# Patient Record
Sex: MALE | Race: BLACK OR AFRICAN AMERICAN | Employment: OTHER | URBAN - METROPOLITAN AREA
[De-identification: names, ages, dates, MRNs, and addresses within clinical notes are randomized per-mention and may not be internally consistent; named-entity substitution may affect disease eponyms.]

---

## 2018-04-18 ENCOUNTER — APPOINTMENT (OUTPATIENT)
Dept: GENERAL RADIOLOGY | Facility: HOSPITAL | Age: 63
DRG: 190 | End: 2018-04-18
Attending: EMERGENCY MEDICINE
Payer: MEDICARE

## 2018-04-18 ENCOUNTER — HOSPITAL ENCOUNTER (INPATIENT)
Facility: HOSPITAL | Age: 63
LOS: 3 days | Discharge: HOME HEALTH | DRG: 190 | End: 2018-04-22
Attending: EMERGENCY MEDICINE | Admitting: INTERNAL MEDICINE
Payer: MEDICARE

## 2018-04-18 DIAGNOSIS — J06.9 VIRAL URI: ICD-10-CM

## 2018-04-18 DIAGNOSIS — J45.51 SEVERE PERSISTENT ASTHMA WITH ACUTE EXACERBATION: Primary | ICD-10-CM

## 2018-04-18 DIAGNOSIS — R06.03 RESPIRATORY DISTRESS: ICD-10-CM

## 2018-04-18 PROBLEM — R73.9 HYPERGLYCEMIA: Status: ACTIVE | Noted: 2018-04-18

## 2018-04-18 PROCEDURE — 5A09357 ASSISTANCE WITH RESPIRATORY VENTILATION, LESS THAN 24 CONSECUTIVE HOURS, CONTINUOUS POSITIVE AIRWAY PRESSURE: ICD-10-PCS | Performed by: HOSPITALIST

## 2018-04-18 PROCEDURE — 99291 CRITICAL CARE FIRST HOUR: CPT | Performed by: INTERNAL MEDICINE

## 2018-04-18 PROCEDURE — 71045 X-RAY EXAM CHEST 1 VIEW: CPT | Performed by: EMERGENCY MEDICINE

## 2018-04-18 RX ORDER — METHYLPREDNISOLONE SODIUM SUCCINATE 125 MG/2ML
125 INJECTION, POWDER, LYOPHILIZED, FOR SOLUTION INTRAMUSCULAR; INTRAVENOUS ONCE
Status: DISCONTINUED | OUTPATIENT
Start: 2018-04-18 | End: 2018-04-18

## 2018-04-18 RX ORDER — LORAZEPAM 2 MG/ML
0.5 INJECTION INTRAMUSCULAR ONCE
Status: COMPLETED | OUTPATIENT
Start: 2018-04-18 | End: 2018-04-18

## 2018-04-18 RX ORDER — PREGABALIN 75 MG/1
75 CAPSULE ORAL
COMMUNITY
End: 2018-12-28 | Stop reason: ALTCHOICE

## 2018-04-18 RX ORDER — HYDROCODONE BITARTRATE AND ACETAMINOPHEN 7.5; 325 MG/1; MG/1
1 TABLET ORAL
COMMUNITY
Start: 2016-04-22 | End: 2018-12-28 | Stop reason: ALTCHOICE

## 2018-04-18 RX ORDER — MAGNESIUM SULFATE HEPTAHYDRATE 40 MG/ML
2 INJECTION, SOLUTION INTRAVENOUS ONCE
Status: COMPLETED | OUTPATIENT
Start: 2018-04-18 | End: 2018-04-18

## 2018-04-18 RX ORDER — METHYLPREDNISOLONE SODIUM SUCCINATE 125 MG/2ML
125 INJECTION, POWDER, LYOPHILIZED, FOR SOLUTION INTRAMUSCULAR; INTRAVENOUS ONCE
Status: COMPLETED | OUTPATIENT
Start: 2018-04-18 | End: 2018-04-18

## 2018-04-18 RX ORDER — VALSARTAN 320 MG/1
320 TABLET ORAL DAILY
Status: ON HOLD | COMMUNITY
End: 2019-03-18

## 2018-04-19 ENCOUNTER — APPOINTMENT (OUTPATIENT)
Dept: GENERAL RADIOLOGY | Facility: HOSPITAL | Age: 63
DRG: 190 | End: 2018-04-19
Attending: NURSE PRACTITIONER
Payer: MEDICARE

## 2018-04-19 PROBLEM — J06.9 VIRAL URI: Status: ACTIVE | Noted: 2018-04-19

## 2018-04-19 PROBLEM — R06.03 RESPIRATORY DISTRESS: Status: ACTIVE | Noted: 2018-04-19

## 2018-04-19 PROCEDURE — 71045 X-RAY EXAM CHEST 1 VIEW: CPT | Performed by: NURSE PRACTITIONER

## 2018-04-19 PROCEDURE — 99232 SBSQ HOSP IP/OBS MODERATE 35: CPT | Performed by: HOSPITALIST

## 2018-04-19 RX ORDER — METHYLPREDNISOLONE SODIUM SUCCINATE 125 MG/2ML
60 INJECTION, POWDER, LYOPHILIZED, FOR SOLUTION INTRAMUSCULAR; INTRAVENOUS EVERY 8 HOURS
Status: DISCONTINUED | OUTPATIENT
Start: 2018-04-19 | End: 2018-04-19

## 2018-04-19 RX ORDER — ENOXAPARIN SODIUM 100 MG/ML
40 INJECTION SUBCUTANEOUS DAILY
Status: DISCONTINUED | OUTPATIENT
Start: 2018-04-19 | End: 2018-04-19

## 2018-04-19 RX ORDER — ACETAMINOPHEN 325 MG/1
650 TABLET ORAL EVERY 4 HOURS PRN
Status: DISCONTINUED | OUTPATIENT
Start: 2018-04-19 | End: 2018-04-22

## 2018-04-19 RX ORDER — SODIUM PHOSPHATE, DIBASIC AND SODIUM PHOSPHATE, MONOBASIC 7; 19 G/133ML; G/133ML
1 ENEMA RECTAL ONCE AS NEEDED
Status: DISCONTINUED | OUTPATIENT
Start: 2018-04-19 | End: 2018-04-22

## 2018-04-19 RX ORDER — DEXMEDETOMIDINE HYDROCHLORIDE 4 UG/ML
INJECTION, SOLUTION INTRAVENOUS CONTINUOUS
Status: DISCONTINUED | OUTPATIENT
Start: 2018-04-19 | End: 2018-04-20 | Stop reason: HOSPADM

## 2018-04-19 RX ORDER — POLYETHYLENE GLYCOL 3350 17 G/17G
17 POWDER, FOR SOLUTION ORAL DAILY PRN
Status: DISCONTINUED | OUTPATIENT
Start: 2018-04-19 | End: 2018-04-22

## 2018-04-19 RX ORDER — HALOPERIDOL 5 MG/ML
1 INJECTION INTRAMUSCULAR EVERY 6 HOURS PRN
Status: DISCONTINUED | OUTPATIENT
Start: 2018-04-19 | End: 2018-04-22

## 2018-04-19 RX ORDER — PREGABALIN 25 MG/1
25 CAPSULE ORAL 3 TIMES DAILY
Status: DISCONTINUED | OUTPATIENT
Start: 2018-04-19 | End: 2018-04-22

## 2018-04-19 RX ORDER — PREGABALIN 75 MG/1
75 CAPSULE ORAL NIGHTLY
Status: DISCONTINUED | OUTPATIENT
Start: 2018-04-19 | End: 2018-04-19

## 2018-04-19 RX ORDER — LOSARTAN POTASSIUM 100 MG/1
100 TABLET ORAL DAILY
Status: DISCONTINUED | OUTPATIENT
Start: 2018-04-19 | End: 2018-04-22

## 2018-04-19 RX ORDER — HYDROCODONE BITARTRATE AND ACETAMINOPHEN 5; 325 MG/1; MG/1
2 TABLET ORAL EVERY 4 HOURS PRN
Status: DISCONTINUED | OUTPATIENT
Start: 2018-04-19 | End: 2018-04-20

## 2018-04-19 RX ORDER — BENZONATATE 200 MG/1
200 CAPSULE ORAL 2 TIMES DAILY PRN
Status: DISCONTINUED | OUTPATIENT
Start: 2018-04-19 | End: 2018-04-22

## 2018-04-19 RX ORDER — BISACODYL 10 MG
10 SUPPOSITORY, RECTAL RECTAL
Status: DISCONTINUED | OUTPATIENT
Start: 2018-04-19 | End: 2018-04-22

## 2018-04-19 RX ORDER — ONDANSETRON 2 MG/ML
4 INJECTION INTRAMUSCULAR; INTRAVENOUS EVERY 6 HOURS PRN
Status: DISCONTINUED | OUTPATIENT
Start: 2018-04-19 | End: 2018-04-22

## 2018-04-19 RX ORDER — FAMOTIDINE 10 MG/ML
20 INJECTION, SOLUTION INTRAVENOUS 2 TIMES DAILY
Status: DISCONTINUED | OUTPATIENT
Start: 2018-04-19 | End: 2018-04-22

## 2018-04-19 RX ORDER — FAMOTIDINE 20 MG/1
20 TABLET ORAL 2 TIMES DAILY
Status: DISCONTINUED | OUTPATIENT
Start: 2018-04-19 | End: 2018-04-22

## 2018-04-19 RX ORDER — IPRATROPIUM BROMIDE AND ALBUTEROL SULFATE 2.5; .5 MG/3ML; MG/3ML
3 SOLUTION RESPIRATORY (INHALATION)
Status: DISCONTINUED | OUTPATIENT
Start: 2018-04-19 | End: 2018-04-22

## 2018-04-19 RX ORDER — FINASTERIDE 5 MG/1
5 TABLET, FILM COATED ORAL DAILY
Status: DISCONTINUED | OUTPATIENT
Start: 2018-04-19 | End: 2018-04-22

## 2018-04-19 RX ORDER — ACETAMINOPHEN 325 MG/1
650 TABLET ORAL EVERY 6 HOURS PRN
Status: DISCONTINUED | OUTPATIENT
Start: 2018-04-19 | End: 2018-04-19

## 2018-04-19 RX ORDER — LORAZEPAM 2 MG/ML
1 INJECTION INTRAMUSCULAR EVERY 4 HOURS PRN
Status: DISCONTINUED | OUTPATIENT
Start: 2018-04-19 | End: 2018-04-22

## 2018-04-19 RX ORDER — METHYLPREDNISOLONE SODIUM SUCCINATE 125 MG/2ML
60 INJECTION, POWDER, LYOPHILIZED, FOR SOLUTION INTRAMUSCULAR; INTRAVENOUS EVERY 12 HOURS
Status: COMPLETED | OUTPATIENT
Start: 2018-04-19 | End: 2018-04-20

## 2018-04-19 RX ORDER — MONTELUKAST SODIUM 10 MG/1
10 TABLET ORAL NIGHTLY
Status: DISCONTINUED | OUTPATIENT
Start: 2018-04-19 | End: 2018-04-22

## 2018-04-19 RX ORDER — KETOROLAC TROMETHAMINE 5 MG/ML
1 SOLUTION OPHTHALMIC 4 TIMES DAILY
Status: DISCONTINUED | OUTPATIENT
Start: 2018-04-19 | End: 2018-04-22

## 2018-04-19 RX ORDER — DUTASTERIDE 0.5 MG/1
0.5 CAPSULE, LIQUID FILLED ORAL DAILY
COMMUNITY
End: 2018-12-28 | Stop reason: ALTCHOICE

## 2018-04-19 RX ORDER — IPRATROPIUM BROMIDE AND ALBUTEROL SULFATE 2.5; .5 MG/3ML; MG/3ML
3 SOLUTION RESPIRATORY (INHALATION) EVERY 6 HOURS PRN
Status: DISCONTINUED | OUTPATIENT
Start: 2018-04-19 | End: 2018-04-22

## 2018-04-19 RX ORDER — SODIUM CHLORIDE 9 MG/ML
INJECTION, SOLUTION INTRAVENOUS CONTINUOUS
Status: DISCONTINUED | OUTPATIENT
Start: 2018-04-19 | End: 2018-04-20

## 2018-04-19 RX ORDER — SODIUM CHLORIDE 9 MG/ML
INJECTION, SOLUTION INTRAVENOUS CONTINUOUS
Status: DISCONTINUED | OUTPATIENT
Start: 2018-04-19 | End: 2018-04-19

## 2018-04-19 RX ORDER — HYDRALAZINE HYDROCHLORIDE 20 MG/ML
10 INJECTION INTRAMUSCULAR; INTRAVENOUS EVERY 4 HOURS PRN
Status: DISCONTINUED | OUTPATIENT
Start: 2018-04-19 | End: 2018-04-22

## 2018-04-19 RX ORDER — DOCUSATE SODIUM 100 MG/1
100 CAPSULE, LIQUID FILLED ORAL 2 TIMES DAILY
Status: DISCONTINUED | OUTPATIENT
Start: 2018-04-19 | End: 2018-04-22

## 2018-04-19 RX ORDER — HYDROCODONE BITARTRATE AND ACETAMINOPHEN 5; 325 MG/1; MG/1
1 TABLET ORAL EVERY 6 HOURS PRN
Status: DISCONTINUED | OUTPATIENT
Start: 2018-04-19 | End: 2018-04-20

## 2018-04-19 RX ORDER — ENOXAPARIN SODIUM 100 MG/ML
40 INJECTION SUBCUTANEOUS DAILY
Status: DISCONTINUED | OUTPATIENT
Start: 2018-04-19 | End: 2018-04-22

## 2018-04-19 RX ORDER — DEXTROSE MONOHYDRATE 25 G/50ML
50 INJECTION, SOLUTION INTRAVENOUS
Status: DISCONTINUED | OUTPATIENT
Start: 2018-04-19 | End: 2018-04-22

## 2018-04-19 RX ORDER — QUETIAPINE 50 MG/1
50 TABLET, FILM COATED ORAL NIGHTLY
Status: DISCONTINUED | OUTPATIENT
Start: 2018-04-19 | End: 2018-04-22

## 2018-04-19 RX ORDER — IPRATROPIUM BROMIDE AND ALBUTEROL SULFATE 2.5; .5 MG/3ML; MG/3ML
3 SOLUTION RESPIRATORY (INHALATION)
Status: DISCONTINUED | OUTPATIENT
Start: 2018-04-19 | End: 2018-04-19

## 2018-04-19 NOTE — CONSULTS
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  He has agreed to steroids in the short run but will not take long-term.     PAST HISTORY:     · Asthma  · Essential hypertension  · Type 2 diabetes mellitus    Histo 4/19/2018 at Unknown time   Ketorolac Tromethamine (ACULAR) 0.4 % Ophthalmic Solution Apply 1 drop to eye 4 (four) times daily.  Disp: 5 mL Rfl: 0 4/19/2018 at Unknown time   NON FORMULARY Oxygen 2L at home at night Disp:  Rfl:  4/19/2018 at Unknown time Review:  Recent Labs   Lab  04/18/18 2114  04/19/18   0430   RBC  4.76  4.69   HGB  13.7  13.2   HCT  43.2  42.8   MCV  90.8  91.3   MCH  28.8  28.1   MCHC  31.7  30.8*   RDW  11.8  11.8   NEPRELIM  5.67  7.54*   WBC  8.3  7.8   PLT  228.0  186.0     Rec Brooke HAGER.  Crit care 45  4/19/2018  6:18 AM

## 2018-04-19 NOTE — ED PROVIDER NOTES
Patient Seen in: [facility] Emergency Department    History   Patient presents with:  Dyspnea NANETTE SOB (respiratory)    Stated Complaint: nanette    HPI    Patient presents to the emergency department with difficulty in breathing.- has a history of asthm negative except as noted above.     Physical Exam   ED Triage Vitals [04/18/18 2110]  BP: (!) 175/98  Pulse: 108  Resp: 23  Temp: (!) 96.8 °F (36 °C)  Temp src: Temporal  SpO2: 95 %  O2 Device: Nasal cannula    Current:BP (!) 165/89   Pulse 96   Temp 97.9 ° (14)   MAGNESIUM   PHOSPHORUS   CBC WITH DIFFERENTIAL WITH PLATELET   HEMOGLOBIN A1C   RAINBOW DRAW BLUE   RAINBOW DRAW LAVENDER   RAINBOW DRAW LIGHT GREEN   RAINBOW DRAW GOLD   RESPIRATORY PANEL FLU EXPANDED   MRSA CULTURE ONLY   CBC W/ DIFFERENTIAL     E he could get his medication. Then, Solu-Medrol given which patient did agree to magnesium drip started.   Patient is getting ongoing albuterol after Lorazepam was able to tolerate BiPAP again and then noticeably called and had good effect with the BiPAP an

## 2018-04-19 NOTE — ED NOTES
Patient standing by side of bed, monitor discontinued, breathing treatment on bed. He stated to this RN he can't breathe and he is hot and needs to cool down. RN educated and reinforced use of breathing treatment. Replaced monitor.  Patient yelling at this

## 2018-04-19 NOTE — RESPIRATORY THERAPY NOTE
Received patient on CPAP. Attempted to place patient on BIPAP but did not tolerate and refused. Pt breath sounds-diminished/wheezes with labored breathing. Continuos neb started with slight improvement.  Pt. very anxious and refused to sit on the bed and kep

## 2018-04-19 NOTE — ED NOTES
Patient agreeable to taking chest xray, laying upright in bed. RN administered medications as documented and patient stated some relief, he is no longer as agitated as previously. He is still tachypneic and labored. Breathing treatment continued.  Respirato

## 2018-04-19 NOTE — PLAN OF CARE
CARDIOVASCULAR - ADULT    • Maintains optimal cardiac output and hemodynamic stability Not Progressing          METABOLIC/FLUID AND ELECTROLYTES - ADULT    • Glucose maintained within prescribed range Not Progressing          RESPIRATORY - ADULT    • Achie

## 2018-04-19 NOTE — PROGRESS NOTES
[facility]  Progress Note     Ronenncwisam Lange Patient Status:  Inpatient    1955 MRN [id number]   [facility] 4SW-A Attending Evelyn Erickson MD   Hosp Day # 0 PCP Leta Mann     Chief Complaint: Dyspnea    S: Patient off bipap Daily   • Montelukast Sodium  10 mg Oral Nightly   • docusate sodium  100 mg Oral BID   • QUEtiapine Fumarate  50 mg Oral Nightly   • ketorolac tromethamine  1 drop Ophthalmic QID   • pregabalin  75 mg Oral Nightly   • Tobramycin Sulfate  1 Application Oph

## 2018-04-19 NOTE — H&P
[facility]  History and Physical     Benny Carpenter Patient Status:  Inpatient    1955 MRN [id number]   [facility] 4SW-A Attending Jose Alfredo Meyer MD   Hosp Day # 0 PCP Mega Austin     Chief Complaint: SOB, agitation     Hist Encounter:  IBUPROFEN OR  Disp:  Rfl:    ipratropium-albuterol (COMBIVENT)  MCG/ACT Inhalation Aerosol Inhale 2 puffs into the lungs 4 (four) times daily.  Disp:  Rfl:    Albuterol Sulfate HFA (PROAIR HFA) 108 (90 BASE) MCG/ACT Inhalation Aero Soln In Lab  04/18/18   2114   GLU  287*   BUN  17   CREATSERUM  1.29   GFRAA  69   GFRNAA  60   CA  9.2   ALB  3.8   NA  136   K  4.2   CL  102   CO2  26.0   ALKPHO  98   AST  31   ALT  28   BILT  0.4   TP  7.5       No results for input(s): PTP, INR in the las

## 2018-04-19 NOTE — PROGRESS NOTES
[id number] [facility] Patient Status:  Inpatient    1955 MRN [id number]   [facility] 4SW-A Attending Abby Avina MD   Hosp Day # 0 PCP Dwight Cai     Critical Care Progress Note     S: Mr. Frannie Booker came to the ED with shor MCHC 31.7 04/18/2018   RDW 11.8 04/18/2018   .0 04/18/2018       Lab Results  Component Value Date    04/18/2018   K 4.2 04/18/2018    04/18/2018   CO2 26.0 04/18/2018   BUN 17 04/18/2018   CREATSERUM 1.29 04/18/2018    04/18/2018

## 2018-04-19 NOTE — ED INITIAL ASSESSMENT (HPI)
Patient presents with nanette, he is normally on oxygen at home and had a new concentrator delivered today and the adaptor was different and he was unable to use his normal oxygen. He also has had a productive cough with green sputum.  He used his inhaler 6 rosa

## 2018-04-20 PROCEDURE — 99232 SBSQ HOSP IP/OBS MODERATE 35: CPT | Performed by: HOSPITALIST

## 2018-04-20 RX ORDER — HYDROCODONE BITARTRATE AND ACETAMINOPHEN 10; 325 MG/1; MG/1
1 TABLET ORAL EVERY 4 HOURS PRN
Status: DISCONTINUED | OUTPATIENT
Start: 2018-04-20 | End: 2018-04-22

## 2018-04-20 RX ORDER — ALBUTEROL SULFATE 90 UG/1
1 AEROSOL, METERED RESPIRATORY (INHALATION) EVERY 4 HOURS PRN
Status: DISCONTINUED | OUTPATIENT
Start: 2018-04-20 | End: 2018-04-22

## 2018-04-20 RX ORDER — PREDNISONE 20 MG/1
40 TABLET ORAL
Status: DISCONTINUED | OUTPATIENT
Start: 2018-04-21 | End: 2018-04-21

## 2018-04-20 NOTE — PROGRESS NOTES
04/20/18 0430   Provider Notification   Reason for Communication Patient request  (snacks)   Provider Name Naz Ugarte MD   Method of Communication Page   Response Waiting for response   Notification Time 0430    called back- ok to give carb cover n

## 2018-04-20 NOTE — PROGRESS NOTES
Called ortho consult. Dr. Jhony Braden states pt can f/u as an outpt for poss knee steroid injections.

## 2018-04-20 NOTE — PROGRESS NOTES
[facility]  Progress Note     John Posada Patient Status:  Inpatient    1955 MRN [id number]   [facility] 4SW-A Attending Marni Roth MD   Hosp Day # 1 PCP Hay Li     Chief Complaint: Dyspnea    S: Patient states he day   • famoTIDine  20 mg Oral BID    Or   • famoTIDine  20 mg Intravenous BID   • Fluticasone Furoate-Vilanterol  1 puff Inhalation Daily   • Montelukast Sodium  10 mg Oral Nightly   • docusate sodium  100 mg Oral BID   • QUEtiapine Fumarate  50 mg Oral N

## 2018-04-20 NOTE — PROGRESS NOTES
Multidisciplinary Discharge Rounds held 4/20/2018. Treatment team members present today include , , Charge Nurse,  Nurse, RT, PT and Pharmacy caring for [facility].      Other care providers present:    Patient Active Problem

## 2018-04-20 NOTE — PLAN OF CARE
Upon assessment pt AOx4, states he has pain in his shoulders, prn medications given. Pt resting comfortably on 2L NC, slight expiratory wheeze but denies any SOB. Pt up to void at bedside, tolerating well. Awaiting transport, report given to [facility].

## 2018-04-20 NOTE — HOME CARE LIAISON
Referral received for home health and patient is agreeable to [address].     IAGNOSES AND PERTINENT MEDICAL HISTORY:  VIRAL URI, ASTHMA    FACILITY NAME AND DC DATE:  [facility] VISIT WITH:  BRIGIDO MET WITH PATIENT AT

## 2018-04-20 NOTE — CM/SW NOTE
Received order due to concerns about pt's home and family situation. Met with pt who is a 62 y/o man with history of COPD currently admitted for asthma exacerbation. Pt lives with his wife and 3 children (ages 13, 9 and 6).   Pt's 26 y/o dtr who lives in stated that he is often non-compliant. They have supplied a concentrator only, no portable tanks. Pt has requested portables, but [facility] has not received orders for this.   They did receive orders for neb and attempted to deliver in 10/2017, however pt r

## 2018-04-20 NOTE — PROGRESS NOTES
[facility] Pulmonary/Critical Care Progress Note     SUBJECTIVE/24H Events: All events, procedures, notes reviewed. He feels better today, able to expectorate mucus but still feels dyspneic and wheezing. No f/c/s.  No ch GFRAA  69  62  87   GFRNAA  60  54*  75   CA  9.2  9.3  9.0   NA  136  133*  138   K  4.2  4.9  4.1   CL  102  101  104   CO2  26.0  25.0  27.0     Recent Labs   Lab  04/18/18   2114  04/19/18   0430  04/20/18   0614   RBC  4.76  4.69  4.22*   HGB  13.7 times per day   • famoTIDine  20 mg Oral BID    Or   • famoTIDine  20 mg Intravenous BID   • Fluticasone Furoate-Vilanterol  1 puff Inhalation Daily   • Montelukast Sodium  10 mg Oral Nightly   • docusate sodium  100 mg Oral BID   • QUEtiapine Fumarate  50

## 2018-04-21 PROCEDURE — 99232 SBSQ HOSP IP/OBS MODERATE 35: CPT | Performed by: HOSPITALIST

## 2018-04-21 RX ORDER — PREDNISONE 50 MG/1
50 TABLET ORAL
Status: DISCONTINUED | OUTPATIENT
Start: 2018-04-22 | End: 2018-04-22

## 2018-04-21 NOTE — DIETARY NOTE
Nutrition Short Note    Dietitian consult received for diabetes diet education.  Appropriate education and handout provided.  Discussed carbohydrate counting, role of carbohydrates in blood sugars, carbohydrate containing foods, appropriate carbohydrates at

## 2018-04-21 NOTE — PROGRESS NOTES
[facility]  Progress Note    Lexie Magaña Patient Status:  Inpatient    1955 MRN [id number]   [facility] 5NW-A Attending Endy Jacob MD   Hosp Day # 2 PCP Bernabe Ocampo     Subjective:  Lexie Magaña is a(n) 63year old male Shoulder arthritis     Aseptic necrosis of bone, site unspecified     S/P shoulder surgery-global 2/7/14     Syncope     Asthma exacerbation     Acute asthma     Hyperglycemia     Severe persistent asthma with acute exacerbation     Opioid abuse     Narc

## 2018-04-21 NOTE — PROGRESS NOTES
[facility]  Progress Note     Argelia Angulo Patient Status:  Inpatient    1955 MRN [id number]   [facility] 4SW-A Attending Xenia Corona MD   Hosp Day # 2 PCP Ann Mike     Chief Complaint: Dyspnea    S: Patient states he breakfast   • enoxaparin  40 mg Subcutaneous Daily   • ipratropium-albuterol  3 mL Nebulization 6 times per day   • famoTIDine  20 mg Oral BID    Or   • famoTIDine  20 mg Intravenous BID   • Fluticasone Furoate-Vilanterol  1 puff Inhalation Daily   • Eugene

## 2018-04-21 NOTE — PLAN OF CARE
CARDIOVASCULAR - ADULT    • Maintains optimal cardiac output and hemodynamic stability Progressing        METABOLIC/FLUID AND ELECTROLYTES - ADULT    • Glucose maintained within prescribed range Progressing        Patient/Family Goals    • Patient/Family L

## 2018-04-22 VITALS
OXYGEN SATURATION: 94 % | DIASTOLIC BLOOD PRESSURE: 85 MMHG | TEMPERATURE: 98 F | HEIGHT: 76 IN | WEIGHT: 224.31 LBS | SYSTOLIC BLOOD PRESSURE: 159 MMHG | HEART RATE: 84 BPM | RESPIRATION RATE: 18 BRPM | BODY MASS INDEX: 27.32 KG/M2

## 2018-04-22 PROCEDURE — 99239 HOSP IP/OBS DSCHRG MGMT >30: CPT | Performed by: HOSPITALIST

## 2018-04-22 RX ORDER — IPRATROPIUM BROMIDE AND ALBUTEROL SULFATE 2.5; .5 MG/3ML; MG/3ML
3 SOLUTION RESPIRATORY (INHALATION)
Qty: 270 VIAL | Refills: 0 | Status: SHIPPED | OUTPATIENT
Start: 2018-04-22 | End: 2018-12-28 | Stop reason: ALTCHOICE

## 2018-04-22 RX ORDER — MONTELUKAST SODIUM 10 MG/1
10 TABLET ORAL NIGHTLY
Qty: 30 TABLET | Refills: 0 | Status: ON HOLD | OUTPATIENT
Start: 2018-04-22 | End: 2019-04-06

## 2018-04-22 RX ORDER — BENZONATATE 200 MG/1
200 CAPSULE ORAL 2 TIMES DAILY PRN
Qty: 30 CAPSULE | Refills: 0 | Status: SHIPPED | OUTPATIENT
Start: 2018-04-22 | End: 2019-03-13 | Stop reason: CLARIF

## 2018-04-22 RX ORDER — PREDNISONE 10 MG/1
TABLET ORAL
Qty: 45 TABLET | Refills: 0 | Status: SHIPPED | OUTPATIENT
Start: 2018-04-22 | End: 2018-05-29 | Stop reason: ALTCHOICE

## 2018-04-22 RX ORDER — POTASSIUM CHLORIDE 20 MEQ/1
40 TABLET, EXTENDED RELEASE ORAL EVERY 4 HOURS
Status: COMPLETED | OUTPATIENT
Start: 2018-04-22 | End: 2018-04-22

## 2018-04-22 NOTE — PROGRESS NOTES
NURSING DISCHARGE NOTE    Discharged Home via Wheelchair. Accompanied by Support staff  Belongings Taken by patient/family. Pt is assessed and ready for discharge. Instructions gone over with patient. Scripts sent with patient to fill.  All questi

## 2018-04-22 NOTE — CM/SW NOTE
Call from RN that pt will d/c today and has a question for SW. He is on room air and independent. Spoke with pt. He notes that he has to get a PET scan for his wife and wanted phone #. He will be talking to her PCP re: order.   TRI referred pt to [facility]

## 2018-04-22 NOTE — PROGRESS NOTES
[facility]  Progress Note     Margareth [place] Patient Status:  Inpatient    1955 MRN [id number]   [facility] 4SW-A Attending Lul Velasquez MD   Hosp Day # 3 PCP Yury Strickland     Chief Complaint: Dyspnea    S: Patient without c Fluticasone Furoate-Vilanterol  1 puff Inhalation Daily   • Montelukast Sodium  10 mg Oral Nightly   • docusate sodium  100 mg Oral BID   • QUEtiapine Fumarate  50 mg Oral Nightly   • ketorolac tromethamine  1 drop Ophthalmic QID   • Tobramycin Sulfate  1

## 2018-04-23 NOTE — CM/SW NOTE
Patient discharged on 04/22/2018 as previously planned.        04/23/18 0900   Discharge disposition   Expected discharge disposition Home-Health   Name of Facillity/Home Care/Hospice Residential   Home services after discharge Skilled home care

## 2018-04-23 NOTE — DISCHARGE SUMMARY
[facility]  DISCHARGE SUMMARY     Samantha Cain Patient Status:  Inpatient    1955 MRN [id number]   [facility] 5NW-A Attending No att. providers found   Hosp Day # 3 PCP Martin Horne     Date of Admission: 2018  Date of improve. His blood sugars improved with adjustment of his diabetes regimen. He has been weaned off oxygen and is been cleared for discharge.   He was given a referral to orthopedic surgery as an outpatient for possible steroid injections for bilateral ost XIMENA  What changed: You were already taking a medication with the same name, and this prescription was added. Make sure you understand how and when to take each. Take 3 mL by nebulization TID & HS.    Quantity:  270 vial  Refills:  0     insulin det MG Tabs  · predniSONE 10 MG Tabs  · Umeclidinium Bromide 62.5 MCG/INH Aepb         [place]    Follow-up appointment:   Sera Skinner [phone]  [phone]    In 1 week      Rodolfo Whitmore MD  [address]

## 2018-04-28 ENCOUNTER — HOSPITAL ENCOUNTER (EMERGENCY)
Facility: HOSPITAL | Age: 63
Discharge: LEFT BEFORE DISCHARGE | End: 2018-04-28
Attending: EMERGENCY MEDICINE
Payer: MEDICARE

## 2018-04-28 VITALS
TEMPERATURE: 99 F | RESPIRATION RATE: 11 BRPM | SYSTOLIC BLOOD PRESSURE: 149 MMHG | DIASTOLIC BLOOD PRESSURE: 70 MMHG | HEART RATE: 101 BPM | OXYGEN SATURATION: 94 %

## 2018-04-28 DIAGNOSIS — R73.9 HYPERGLYCEMIA: Primary | ICD-10-CM

## 2018-04-28 DIAGNOSIS — Z53.29 LEFT AGAINST MEDICAL ADVICE: ICD-10-CM

## 2018-04-28 PROCEDURE — 85025 COMPLETE CBC W/AUTO DIFF WBC: CPT

## 2018-04-28 PROCEDURE — 80053 COMPREHEN METABOLIC PANEL: CPT

## 2018-04-28 PROCEDURE — 99284 EMERGENCY DEPT VISIT MOD MDM: CPT

## 2018-04-28 PROCEDURE — 96361 HYDRATE IV INFUSION ADD-ON: CPT

## 2018-04-28 PROCEDURE — 82962 GLUCOSE BLOOD TEST: CPT

## 2018-04-28 PROCEDURE — 96372 THER/PROPH/DIAG INJ SC/IM: CPT

## 2018-04-28 PROCEDURE — 81003 URINALYSIS AUTO W/O SCOPE: CPT | Performed by: EMERGENCY MEDICINE

## 2018-04-28 PROCEDURE — 96375 TX/PRO/DX INJ NEW DRUG ADDON: CPT

## 2018-04-28 PROCEDURE — 85025 COMPLETE CBC W/AUTO DIFF WBC: CPT | Performed by: EMERGENCY MEDICINE

## 2018-04-28 PROCEDURE — 96374 THER/PROPH/DIAG INJ IV PUSH: CPT

## 2018-04-28 PROCEDURE — 80053 COMPREHEN METABOLIC PANEL: CPT | Performed by: EMERGENCY MEDICINE

## 2018-04-28 PROCEDURE — 99285 EMERGENCY DEPT VISIT HI MDM: CPT

## 2018-04-28 RX ORDER — INSULIN ASPART 100 [IU]/ML
0.2 INJECTION, SOLUTION INTRAVENOUS; SUBCUTANEOUS ONCE
Status: COMPLETED | OUTPATIENT
Start: 2018-04-28 | End: 2018-04-28

## 2018-04-28 RX ORDER — HYDROMORPHONE HYDROCHLORIDE 1 MG/ML
1 INJECTION, SOLUTION INTRAMUSCULAR; INTRAVENOUS; SUBCUTANEOUS ONCE
Status: COMPLETED | OUTPATIENT
Start: 2018-04-28 | End: 2018-04-28

## 2018-04-28 RX ORDER — ONDANSETRON 2 MG/ML
4 INJECTION INTRAMUSCULAR; INTRAVENOUS ONCE
Status: COMPLETED | OUTPATIENT
Start: 2018-04-28 | End: 2018-04-28

## 2018-04-28 NOTE — ED PROVIDER NOTES
Patient Seen in: [facility] Emergency Department    History   Patient presents with:  Hyperglycemia (metabolic)    Stated Complaint: hyperglycemic- 314 with blurred vision    HPI    68-year-old male comes to the emergency department complaining of r for stated complaint: hyperglycemic- 314 with blurred vision  Other systems are as noted in HPI. Constitutional and vital signs reviewed. All other systems reviewed and negative except as noted above.     Physical Exam   ED Triage Vitals [04/28/18 121 Neutrophil Absolute 13.56 (*)     Lymphocyte Absolute 0.47 (*)     All other components within normal limits   CBC WITH DIFFERENTIAL WITH PLATELET    Narrative: The following orders were created for panel order CBC WITH DIFFERENTIAL WITH PLATELET.   Pro

## 2018-04-28 NOTE — ED NOTES
Pt pulled out his IV and left with his family. Pt seen in the hallway, stated that he has everything he needs and is leaving. Md notified. Pt did not receive discharge instructions or Rxs.

## 2018-04-28 NOTE — ED INITIAL ASSESSMENT (HPI)
Pt to ED from home with c/o hyperglycemia, decreased po intake, blurred vision x3 days.  Pt admitted to hospital last week for asthma and shoulder pain after surgery, was discharged without a prescription for insulin/norco.

## 2018-04-28 NOTE — CM/SW NOTE
This writer went to speak with patient regarding his diabetes follow up. Patient reports he was recently discharged on Levemir. He was recently taking glucophage and Levemir, but stopped the glucophage because \"it wasn't doing anything. \"  This writer as

## 2018-05-09 ENCOUNTER — TELEPHONE (OUTPATIENT)
Dept: ENDOCRINOLOGY CLINIC | Facility: CLINIC | Age: 63
End: 2018-05-09

## 2018-05-19 ENCOUNTER — APPOINTMENT (OUTPATIENT)
Dept: GENERAL RADIOLOGY | Facility: HOSPITAL | Age: 63
End: 2018-05-19
Attending: EMERGENCY MEDICINE
Payer: MEDICARE

## 2018-05-19 ENCOUNTER — HOSPITAL ENCOUNTER (EMERGENCY)
Facility: HOSPITAL | Age: 63
Discharge: HOME | End: 2018-05-19
Attending: EMERGENCY MEDICINE
Payer: MEDICARE

## 2018-05-19 VITALS
HEART RATE: 103 BPM | HEIGHT: 76 IN | BODY MASS INDEX: 27.89 KG/M2 | SYSTOLIC BLOOD PRESSURE: 146 MMHG | RESPIRATION RATE: 18 BRPM | TEMPERATURE: 99 F | WEIGHT: 229 LBS | DIASTOLIC BLOOD PRESSURE: 88 MMHG | OXYGEN SATURATION: 97 %

## 2018-05-19 DIAGNOSIS — R18.8 ABDOMINAL FLUID COLLECTION: Primary | ICD-10-CM

## 2018-05-19 DIAGNOSIS — S46.911A STRAIN OF RIGHT SHOULDER, INITIAL ENCOUNTER: ICD-10-CM

## 2018-05-19 PROCEDURE — 80048 BASIC METABOLIC PNL TOTAL CA: CPT | Performed by: EMERGENCY MEDICINE

## 2018-05-19 PROCEDURE — 85025 COMPLETE CBC W/AUTO DIFF WBC: CPT | Performed by: EMERGENCY MEDICINE

## 2018-05-19 PROCEDURE — 73030 X-RAY EXAM OF SHOULDER: CPT | Performed by: EMERGENCY MEDICINE

## 2018-05-19 PROCEDURE — 84145 PROCALCITONIN (PCT): CPT | Performed by: EMERGENCY MEDICINE

## 2018-05-19 PROCEDURE — 36415 COLL VENOUS BLD VENIPUNCTURE: CPT

## 2018-05-19 PROCEDURE — 96372 THER/PROPH/DIAG INJ SC/IM: CPT

## 2018-05-19 PROCEDURE — 99284 EMERGENCY DEPT VISIT MOD MDM: CPT

## 2018-05-19 PROCEDURE — 99285 EMERGENCY DEPT VISIT HI MDM: CPT

## 2018-05-19 RX ORDER — ACETAMINOPHEN 500 MG
1000 TABLET ORAL ONCE
Status: DISCONTINUED | OUTPATIENT
Start: 2018-05-19 | End: 2018-05-19

## 2018-05-19 RX ORDER — CEPHALEXIN 500 MG/1
500 CAPSULE ORAL 4 TIMES DAILY
Qty: 28 CAPSULE | Refills: 0 | Status: SHIPPED | OUTPATIENT
Start: 2018-05-19 | End: 2018-05-26

## 2018-05-19 RX ORDER — POLYMYXIN B SULFATE AND TRIMETHOPRIM 1; 10000 MG/ML; [USP'U]/ML
1 SOLUTION OPHTHALMIC EVERY 6 HOURS
Qty: 10 ML | Refills: 0 | Status: SHIPPED | OUTPATIENT
Start: 2018-05-19 | End: 2018-05-24

## 2018-05-19 RX ORDER — IBUPROFEN 400 MG/1
400 TABLET ORAL ONCE
Status: DISCONTINUED | OUTPATIENT
Start: 2018-05-19 | End: 2018-05-19

## 2018-05-19 RX ORDER — CEFAZOLIN SODIUM 1 G/3ML
2 INJECTION, POWDER, FOR SOLUTION INTRAMUSCULAR; INTRAVENOUS ONCE
Status: COMPLETED | OUTPATIENT
Start: 2018-05-19 | End: 2018-05-19

## 2018-05-19 NOTE — ED PROVIDER NOTES
Patient Seen in: [facility] Emergency Department    History   Patient presents with:  Upper Extremity Injury (musculoskeletal)    Stated Complaint: right shoulder pain    HPI    This is a 63 ear-old male with past medical history of hypertension, in 154/88  Pulse: 103  Resp: 18  Temp: 98.6 °F (37 °C)  Temp src: Temporal  SpO2: 97 %  O2 Device: None (Room air)    Current:/88   Pulse 103   Temp 98.6 °F (37 °C) (Temporal)   Resp 18   Ht 193 cm (6' 4\")   Wt 103.9 kg   SpO2 97%   BMI 27.87 kg/m² XR SHOULDER, COMPLETE (MIN 2 VIEWS), RIGHT (CPT=73030)     TECHNIQUE:  Multiple views were obtained. COMPARISON:  None.   INDICATIONS:  right shoulder pain  PATIENT STATED HISTORY: (As transcribed by Technologist)  Patient complains of right shoulder pain swallowing or the swelling becomes worse. He was given Polytrim also he states he has been waking up with a green discharge around his eyes.         Disposition and Plan     Clinical Impression:  Abdominal fluid collection  (primary encounter diagnosis)  S

## 2018-05-19 NOTE — CM/SW NOTE
Patient reports he lives at the extended stay. He has lived there since August with his family. Patient reports he just paid his rent. He reports he is on a list to get a 3 bedroom townhouse and believes he will get the home soon for his family.  Patient re

## 2018-05-29 ENCOUNTER — HOSPITAL ENCOUNTER (EMERGENCY)
Facility: HOSPITAL | Age: 63
Discharge: HOME | End: 2018-05-30
Attending: EMERGENCY MEDICINE
Payer: MEDICARE

## 2018-05-29 ENCOUNTER — APPOINTMENT (OUTPATIENT)
Dept: GENERAL RADIOLOGY | Facility: HOSPITAL | Age: 63
End: 2018-05-29
Attending: EMERGENCY MEDICINE
Payer: MEDICARE

## 2018-05-29 DIAGNOSIS — J98.01 ACUTE BRONCHOSPASM: Primary | ICD-10-CM

## 2018-05-29 DIAGNOSIS — J45.41 MODERATE PERSISTENT ASTHMA WITH EXACERBATION: ICD-10-CM

## 2018-05-29 PROCEDURE — 99284 EMERGENCY DEPT VISIT MOD MDM: CPT

## 2018-05-29 PROCEDURE — 94644 CONT INHLJ TX 1ST HOUR: CPT

## 2018-05-29 PROCEDURE — 71045 X-RAY EXAM CHEST 1 VIEW: CPT | Performed by: EMERGENCY MEDICINE

## 2018-05-29 PROCEDURE — 96372 THER/PROPH/DIAG INJ SC/IM: CPT

## 2018-05-29 PROCEDURE — 94645 CONT INHLJ TX EACH ADDL HOUR: CPT

## 2018-05-29 PROCEDURE — 99285 EMERGENCY DEPT VISIT HI MDM: CPT

## 2018-05-29 RX ORDER — PREDNISONE 20 MG/1
60 TABLET ORAL ONCE
Status: COMPLETED | OUTPATIENT
Start: 2018-05-29 | End: 2018-05-29

## 2018-05-30 VITALS
TEMPERATURE: 98 F | DIASTOLIC BLOOD PRESSURE: 83 MMHG | HEART RATE: 99 BPM | OXYGEN SATURATION: 99 % | HEIGHT: 76 IN | WEIGHT: 225 LBS | SYSTOLIC BLOOD PRESSURE: 136 MMHG | BODY MASS INDEX: 27.4 KG/M2 | RESPIRATION RATE: 22 BRPM

## 2018-05-30 PROCEDURE — 94645 CONT INHLJ TX EACH ADDL HOUR: CPT

## 2018-05-30 PROCEDURE — 94640 AIRWAY INHALATION TREATMENT: CPT

## 2018-05-30 PROCEDURE — 94644 CONT INHLJ TX 1ST HOUR: CPT

## 2018-05-30 PROCEDURE — 82962 GLUCOSE BLOOD TEST: CPT

## 2018-05-30 RX ORDER — PREDNISONE 20 MG/1
60 TABLET ORAL DAILY
Qty: 12 TABLET | Refills: 0 | Status: SHIPPED | OUTPATIENT
Start: 2018-05-30 | End: 2018-06-03

## 2018-05-30 RX ORDER — ALBUTEROL SULFATE 90 UG/1
2 AEROSOL, METERED RESPIRATORY (INHALATION) EVERY 4 HOURS PRN
Qty: 1 INHALER | Refills: 0 | Status: SHIPPED | OUTPATIENT
Start: 2018-05-30 | End: 2018-06-29

## 2018-05-30 RX ORDER — ALBUTEROL SULFATE 2.5 MG/3ML
2.5 SOLUTION RESPIRATORY (INHALATION) EVERY 4 HOURS PRN
Qty: 30 AMPULE | Refills: 0 | Status: SHIPPED | OUTPATIENT
Start: 2018-05-30 | End: 2018-06-29

## 2018-05-30 RX ORDER — HYDROCODONE BITARTRATE AND ACETAMINOPHEN 5; 325 MG/1; MG/1
1 TABLET ORAL ONCE
Status: DISCONTINUED | OUTPATIENT
Start: 2018-05-30 | End: 2018-05-30

## 2018-05-30 RX ORDER — AZITHROMYCIN 250 MG/1
TABLET, FILM COATED ORAL
Qty: 1 PACKAGE | Refills: 0 | Status: SHIPPED | OUTPATIENT
Start: 2018-05-30 | End: 2018-06-04

## 2018-05-30 RX ORDER — INSULIN ASPART 100 [IU]/ML
0.1 INJECTION, SOLUTION INTRAVENOUS; SUBCUTANEOUS ONCE
Status: COMPLETED | OUTPATIENT
Start: 2018-05-30 | End: 2018-05-30

## 2018-05-30 NOTE — ED PROVIDER NOTES
Patient Seen in: [facility] Emergency Department    History   Patient presents with:  Dyspnea LEVI SOB (respiratory)    Stated Complaint: asthma     HPI    Patient is a 82-year-old male with history of hypertension, diabetes, asthma versus COPD who p Physical Exam   Constitutional: He is oriented to person, place, and time. He appears well-developed and well-nourished. HENT:   Head: Normocephalic and atraumatic.    Mouth/Throat: Oropharynx is clear and moist.   Eyes: Conjunctivae and EOM are n 0  ------------------------------------------------------------      MDM   63-year-old male with history of asthma/COPD, presenting with shortness of breath and wheezing tonight.   He reports productive cough for couple of days, then worse breathing darleen exacerbation    Disposition:  Discharge  5/30/2018  4:58 am    Follow-up:  Rosalinda Shore [phone]    In 3 days  As needed        Medications Prescribed:  Current Discharge Medication List    START taking

## 2018-05-30 NOTE — ED INITIAL ASSESSMENT (HPI)
Patient states he is having asthma exacerbation, he states his inhalers at home are not working. He had a URI that is triggering his asthma.

## 2018-05-30 NOTE — ED NOTES
Patient is sitting up coughing, c/o increasing LEVI. Discussed admission and declines ability to stay d/t family obligations. MD notified and new orders received.

## 2018-07-15 ENCOUNTER — HOSPITAL ENCOUNTER (EMERGENCY)
Facility: HOSPITAL | Age: 63
Discharge: HOME | End: 2018-07-15
Attending: EMERGENCY MEDICINE
Payer: MEDICARE

## 2018-07-15 VITALS
DIASTOLIC BLOOD PRESSURE: 72 MMHG | SYSTOLIC BLOOD PRESSURE: 138 MMHG | OXYGEN SATURATION: 97 % | WEIGHT: 225 LBS | HEART RATE: 102 BPM | BODY MASS INDEX: 27.4 KG/M2 | RESPIRATION RATE: 16 BRPM | HEIGHT: 76 IN | TEMPERATURE: 98 F

## 2018-07-15 DIAGNOSIS — L03.811 CELLULITIS OF HEAD OR SCALP: Primary | ICD-10-CM

## 2018-07-15 PROCEDURE — 99283 EMERGENCY DEPT VISIT LOW MDM: CPT

## 2018-07-15 RX ORDER — AMOXICILLIN AND CLAVULANATE POTASSIUM 875; 125 MG/1; MG/1
1 TABLET, FILM COATED ORAL 2 TIMES DAILY
Qty: 20 TABLET | Refills: 0 | Status: SHIPPED | OUTPATIENT
Start: 2018-07-15 | End: 2018-07-25

## 2018-07-16 NOTE — ED PROVIDER NOTES
Patient Seen in: [facility] Emergency Department    History   Patient presents with:  Rash Skin Problem (integumentary)    Stated Complaint: rash to scalp after dying hair, ?shingles    HPI    45-year-old male comes the hospital complaint of having 4\")   Wt 102.1 kg   SpO2 97%   BMI 27.39 kg/m²         Physical Exam    HEENT : NC, there are multiple areas of superficial burns with erythema and some tenderness and drainage noted especially behind the ears noted at this time., EOMI, PEERL, throat eunice

## 2018-08-13 PROBLEM — M75.01 ADHESIVE CAPSULITIS OF RIGHT SHOULDER: Status: ACTIVE | Noted: 2018-08-13

## 2018-08-29 ENCOUNTER — HOSPITAL ENCOUNTER (EMERGENCY)
Facility: HOSPITAL | Age: 63
Discharge: HOME | End: 2018-08-29
Payer: MEDICARE

## 2018-08-29 VITALS
OXYGEN SATURATION: 96 % | HEART RATE: 117 BPM | DIASTOLIC BLOOD PRESSURE: 68 MMHG | SYSTOLIC BLOOD PRESSURE: 164 MMHG | RESPIRATION RATE: 20 BRPM | HEIGHT: 76 IN | TEMPERATURE: 98 F | WEIGHT: 225 LBS | BODY MASS INDEX: 27.4 KG/M2

## 2018-08-29 DIAGNOSIS — L73.9 FOLLICULITIS: ICD-10-CM

## 2018-08-29 DIAGNOSIS — H60.391 OTHER INFECTIVE ACUTE OTITIS EXTERNA OF RIGHT EAR: Primary | ICD-10-CM

## 2018-08-29 LAB — GLUCOSE BLD-MCNC: 381 MG/DL (ref 65–99)

## 2018-08-29 PROCEDURE — 99283 EMERGENCY DEPT VISIT LOW MDM: CPT

## 2018-08-29 PROCEDURE — 82962 GLUCOSE BLOOD TEST: CPT

## 2018-08-29 RX ORDER — CEFADROXIL 500 MG/1
500 CAPSULE ORAL 2 TIMES DAILY
Qty: 20 CAPSULE | Refills: 0 | Status: SHIPPED | OUTPATIENT
Start: 2018-08-29 | End: 2018-09-08

## 2018-08-29 RX ORDER — NEOMYCIN SULFATE, POLYMYXIN B SULFATE AND HYDROCORTISONE 10; 3.5; 1 MG/ML; MG/ML; [USP'U]/ML
4 SUSPENSION/ DROPS AURICULAR (OTIC) 3 TIMES DAILY
Qty: 10 ML | Refills: 0 | Status: SHIPPED | OUTPATIENT
Start: 2018-08-29 | End: 2018-09-03

## 2018-08-29 NOTE — ED PROVIDER NOTES
Patient Seen in: [facility] Emergency Department    History   Patient presents with:  Ear Problem Pain (neurosensory)    Stated Complaint: ear pain    HPI    Ruth Herbert is a 71-year-old male who presents today for evaluation of right ear pain.   He has p Device: None (Room air)    Current:BP (!) 164/68   Pulse 117   Temp 97.7 °F (36.5 °C) (Temporal)   Resp 20   Ht 193 cm (6' 4\")   Wt 102.1 kg   SpO2 96%   BMI 27.39 kg/m²         Physical Exam   Constitutional: He is oriented to person, place, and time.  He due to his folliculitis. The patient was contacted at multiple numbers on his face sheet - none of which he was reachable at. I left a voicemail for him to call me back at my extension.      MDM   Clinical impression: Acute otitis externa, folliculitis  P

## 2018-08-30 NOTE — ED NOTES
Patient states calling back to speak with Kary Bernstein after a message was left by her yesterday to have patient call. Reviewed PA note- appears she was contacting patient to let him know of elevated blood glucose.  Pt states he was aware of this and is tyra

## 2019-03-13 ENCOUNTER — APPOINTMENT (OUTPATIENT)
Dept: CT IMAGING | Facility: HOSPITAL | Age: 64
DRG: 894 | End: 2019-03-13
Attending: EMERGENCY MEDICINE
Payer: MEDICARE

## 2019-03-13 ENCOUNTER — APPOINTMENT (OUTPATIENT)
Dept: GENERAL RADIOLOGY | Facility: HOSPITAL | Age: 64
DRG: 894 | End: 2019-03-13
Attending: EMERGENCY MEDICINE
Payer: MEDICARE

## 2019-03-13 PROBLEM — F10.931 ALCOHOL WITHDRAWAL SYNDROME, WITH DELIRIUM (HCC): Status: ACTIVE | Noted: 2019-03-13

## 2019-03-13 PROBLEM — F10.231 ALCOHOL WITHDRAWAL SYNDROME, WITH DELIRIUM (HCC): Status: ACTIVE | Noted: 2019-03-13

## 2019-03-13 PROBLEM — I10 ACCELERATED HYPERTENSION: Status: ACTIVE | Noted: 2019-03-13

## 2019-03-13 PROCEDURE — 70450 CT HEAD/BRAIN W/O DYE: CPT | Performed by: EMERGENCY MEDICINE

## 2019-03-13 PROCEDURE — 71045 X-RAY EXAM CHEST 1 VIEW: CPT | Performed by: EMERGENCY MEDICINE

## 2019-03-13 NOTE — ED INITIAL ASSESSMENT (HPI)
PT here due to not feeling well and having pain to the right side of his neck. Pt stated that this is all started today about 20 minutes ago.

## 2019-03-13 NOTE — ED PROVIDER NOTES
Patient Seen in: [facility] Emergency Department    History   Patient presents with:  Hypertension (cardiovascular)  Hyperglycemia (metabolic)    Stated Complaint: htn, elevated bs    HPI    Patient is a 80-year-old male with a history of hypertensi Temp 98.9 °F (37.2 °C) (Temporal)   Resp (!) 32   Ht 193 cm (6' 4\")   Wt 104.3 kg   SpO2 97%   BMI 28.00 kg/m²         Physical Exam   Constitutional: He is oriented to person, place, and time. He appears well-developed and well-nourished.    HENT:   Head the following components:    POC Glucose 228 (*)     All other components within normal limits   CBC W/ DIFFERENTIAL - Abnormal; Notable for the following components:    Neutrophil Absolute Prelim 8.63 (*)     Neutrophil Absolute 8.63 (*)     All other com 3/13/2019  CONCLUSION:  Normal heart size and pulmonary vascularity. No focal infiltrate, consolidation, effusion or pneumothorax. Postsurgical changes right shoulder. .    Dictated by: Rodrigo Del Toro MD on 3/13/2019 at 18:08     Approved by: Rodrigo Del Toro is quite diaphoretic and overall the appearance looks most suspicious for withdrawal of some kind. Update at 7 PM.  Patient required IM Ativan and Haldol for sedation.   Once rest of the family left the room, patient 19-year-old daughter did reveal gurdeep

## 2019-03-14 NOTE — BH PROGRESS NOTE
Called and spoke with the pts nurse. She said, he is not assessable today. Liaison will check on him tomorrow.

## 2019-03-14 NOTE — PLAN OF CARE
CARDIOVASCULAR - ADULT    • Maintains optimal cardiac output and hemodynamic stability Progressing    • Absence of cardiac arrhythmias or at baseline Progressing        Delirium    • Minimize duration of delirium Progressing        GENITOURINARY - ADULT

## 2019-03-14 NOTE — CONSULTS
Neurology H&P    Gaye Crigler [place] Patient Status:  Inpatient    1955 MRN [id number]   [facility] 4SW-A Attending Rosemary Mcpherson MD   Hosp Day # 1 PCP Donnamarie Koyanagi     Subjective:  Daniela Núñez is a(n) 66year old male with PMH signifi Years since quittin.0      Smokeless tobacco: Former User        Quit date: 2003    Alcohol use: Yes    Drug use: No      Family History:  Family History   Problem Relation Age of Onset   • Cancer Mother    • Heart Disorder Father    • Cancer and encephalopathic from EtOH withdrawal. Pt is lethargic on exam but does localize to noxious stimulation all extremities and can state his name when agitated. Does not really follow commands. EEG will be ordered. Pt will continue precedex for tonight.  No

## 2019-03-14 NOTE — CM/SW NOTE
Sw received order to see pt re: etoh , caregiver resources. Pt is 65 yo male, admitted due to AMS due to metabolic encephalopathy and alcohol withdrawal.  Per ER notes, DCFS was called due to concern that pt's 13 yo was acting as caregiver.     Due to pres

## 2019-03-14 NOTE — H&P
[facility]                                                               History & Physical         Maura Agent Patient Status:  Emergency    1955 MRN [id number]   Location [address] [facility] Attending Joyce Reis MD HERNIA,5+Y/O,REDUCIBL  Nov. 2012       Family history:  Family History   Problem Relation Age of Onset   • Cancer Mother    • Heart Disorder Father    • Cancer Father    • Diabetes Father    • Other (Other) Other       Reviewed    Social history:   reports 03/13/2019    BILT 1.1 03/13/2019    TP 8.2 03/13/2019    AST 14 03/13/2019    ALT 22 03/13/2019    DDIMER <0.27 03/13/2019    MG 1.9 03/13/2019    TROP <0.045 03/13/2019    ETOH <3 03/13/2019    PGLU 228 03/13/2019       No results for input(s): PTP, INR critical care unit  35 minutes of critical care time on admission.   Dr. Marilyn Muhammad will follow from morning tomorrow        Milan Sheehan MD  3/13/2019  9:20 PM

## 2019-03-14 NOTE — PROGRESS NOTES
ICU  Critical Care APRN Progress Note    NAME: Gail Batista - ROOM: / - MRN: [id number] - Age: 66year old - :1955    History Of Present Illness:  Gail Batista is a 66year old male with PMHx significant for asthma, hypertension, and diabetes. Neck: No JVD, neck supple, no adenopathy, trachea midline, no carotid bruits  Lungs: Clear to auscultation bilaterally, respirations unlabored  Heart: tachycardia , S1 and S2 normal, no murmur, rub or gallop  Abdomen: Soft, non-tender, bowel sounds active -good response to 20mg hydralazine, will order 15mg prn  -titrate off Cardene drip   -maintain SBP<160    #Polysubstance abuse  -possible cocaine in urine  -psych liaison    #DM2  -hyperglycemia protocol    #Asthma  -prn nebs as needed         F/E/N:  IVF,

## 2019-03-14 NOTE — PROGRESS NOTES
[facility]  Progress Note     Younglycorine Coad Patient Status:  Inpatient    1955 MRN [id number]   [facility] 4SW-A Attending Humberto Rao,  [facility] Day # 1 PCP Irma Carcamo     Chief Complaint: AMS      S: Patient   Sleepy n • multivitamin/thiamine/folic acid infusion   Intravenous Q24H   • hydrALAzine HCl  20 mg Intravenous Once   • Insulin Aspart Pen  1-5 Units Subcutaneous 4 times per day   • Heparin Sodium (Porcine)  5,000 Units Subcutaneous 2 times per day       ASSESSM sounds with wheezing  Abd Soft  Ext No edema    Recent Labs   Lab  03/13/19   1747  03/14/19   0436   RBC  5.60  5.32   HGB  16.1  15.3   HCT  48.5  47.0   MCV  86.6  88.3   MCH  28.8  28.8   MCHC  33.2  32.6   RDW  12.5  12.8   NEPRELIM  8.63*  8.01*   WB

## 2019-03-14 NOTE — OCCUPATIONAL THERAPY NOTE
Attempted eval this a.m., but pt remains extremely lethargic, not safe to initiate movement at this time. Discussed w/ rn Vijaya Dykes.   Will re-attempt as schedule allows

## 2019-03-14 NOTE — CONSULTS
Critical Care Consult     Assessment / Plan:  1. Encephalopathy - likely due to alcohol withdrawal. Tox screen positive for cocaine as well. CT brain with no acute process. ABG is normal  - treatment as below  2.  Alcohol withdrawal  - CIWA  - benzos prn  - Ipratropium-Albuterol (COMBIVENT RESPIMAT)  MCG/ACT Inhalation Aero Soln Inhale 2 puffs into the lungs 2 (two) times daily. Disp:  Rfl:     amphetamine-dextroamphetamine 30 MG Oral Tab Take 30 mg by mouth 2 (two) times daily.  Disp:  Rfl:     ketoro Physical activity:        Days per week: Not on file        Minutes per session: Not on file      Stress: Not on file    Relationships      Social connections:        Talks on phone: Not on file        Gets together: Not on file        Attends [employer] se

## 2019-03-14 NOTE — PHYSICAL THERAPY NOTE
Physical Therapy    Attempted eval this a.m., but pt remains extremely lethargic, not safe to initiate movement at this time. Discussed w/ rn Vijaya Dykes. Will re-attempt as schedule allows.

## 2019-03-15 ENCOUNTER — APPOINTMENT (OUTPATIENT)
Dept: CT IMAGING | Facility: HOSPITAL | Age: 64
DRG: 894 | End: 2019-03-15
Attending: NURSE PRACTITIONER
Payer: MEDICARE

## 2019-03-15 ENCOUNTER — APPOINTMENT (OUTPATIENT)
Dept: GENERAL RADIOLOGY | Facility: HOSPITAL | Age: 64
DRG: 894 | End: 2019-03-15
Attending: NURSE PRACTITIONER
Payer: MEDICARE

## 2019-03-15 PROCEDURE — 70450 CT HEAD/BRAIN W/O DYE: CPT | Performed by: NURSE PRACTITIONER

## 2019-03-15 PROCEDURE — 71045 X-RAY EXAM CHEST 1 VIEW: CPT | Performed by: NURSE PRACTITIONER

## 2019-03-15 NOTE — CM/SW NOTE
CM received forwarded call from RN that wife is calling. VICTOR MANUEL spoke with Danielle Ormond who advises she just had a wellness check visit from Kaia VILLALPANDO.  Per Debra Henderson, she and her 3 children [place] age 12, Lila Retort age 5 and [de-identified] age 6) are all doing we

## 2019-03-15 NOTE — CM/SW NOTE
Per case finding and information in ICU rounds this am, unsure where pt's 3 minor children are or who is caring for them? Pt listing as [address] on demographic.  Per EMS report, pt was picked up by ambulance to ED here from address: [facility] [place]

## 2019-03-15 NOTE — PROGRESS NOTES
[id number] [facility] Progress Note    Irma Chacon Patient Status:  Inpatient    1955 MRN [id number]   [facility] 4SW-A Attending Faby De La O MD   Hosp Day # 2 PCP Angelita Núñez         Neurology Attending note     I ha arouses to shaking and shouting   Speech mostly mumbling or 1 word could not tell me his name   Follows simple commands - gave thumb up and wiggle toes   Cranial Nerves: forced eye closure, Pupils pinpoint, eyes conjugate, face appears symmetrical,    Edna syndrome, with delirium ([address]     Accelerated hypertension     Cocaine abuse ([facility] [address]     Acute encephalopathy    Assessment/Plan:    ETOH abuse  Cocaine abuse  Hypertension   Possible ETOH withdrawal  Acute encephalopathy     This is a 66year old with alter

## 2019-03-15 NOTE — CM/SW NOTE
VICTOR MANUEL called back to Kaia VILLALPANDO to check on results of wellness check. VICTOR MANUEL advised that Jose Luis Morocho went to extended stay address and that mother was there and kids in school so no issues identified by IRASEMA.   Randolph Massey, 03/15/19, 3:13 PM

## 2019-03-15 NOTE — SLP NOTE
Order received for swallow eval.  Pt lethargic and inappropriate for po trials at this time. Continue NPO. Oral care as tolerated. SLP will f/u 3/16/19 as appropriate. Joe ASTUDILLO aware. Hima Sargent M.S.,[facility]-SLP  Speech Language Pathologist

## 2019-03-15 NOTE — PLAN OF CARE
CONFUSION    • Confusion, delirium, dementia or psychosis is improved or at baseline Not Progressing        1930H Received drowsy, on low dose Precedex at 0.2 mcg/kg/hr, does not follow commands, left leg was dangling at the side of bed, repositioned, stat

## 2019-03-15 NOTE — PHYSICAL THERAPY NOTE
Physical Therapy    Attempted to eval 472, but pt remains agitated and on precedex, not yet appropriate for PT. Will re-attempt as medical stability improves.

## 2019-03-15 NOTE — PLAN OF CARE
CONFUSION    • Confusion, delirium, dementia or psychosis is improved or at baseline Not Progressing        Delirium    • Minimize duration of delirium Not Progressing        NEUROLOGICAL - ADULT    • Achieves stable or improved neurological status Not Pro

## 2019-03-15 NOTE — CONSULTS
[facility]  Pulmonary/Critical Care Consult Note  [facility]  Report of Consultation    Syed Love Patient Status:  Inpatient    1955 MRN [id number]   [facility] 4SW-A Attending Ravindra Salinas, that he does not use drugs.     Allergies:  No Known Allergies    Medications:  • ipratropium-albuterol  3 mL Nebulization 6 times per day   • insulin detemir  10 Units Subcutaneous Aron@[employer]   • Insulin Aspart Pen  2-10 Units Subcutaneous Q6H   • multi 115 (!) 29 98 %   03/14/19 1000 151/80 — — 114 (!) 37 95 %   03/14/19 0900 159/73 99.5 °F (37.5 °C) Temporal 117 (!) 37 95 %       Intake/Output:    Intake/Output Summary (Last 24 hours) at 3/15/2019 0947  Last data filed at 3/15/2019 0525  Gross per 24 ho 258.0  221.0     No results for input(s): BNP in the last 168 hours.   Recent Labs   Lab  03/13/19   1747   TROP  <0.045     Recent Labs   Lab  03/13/19   1747  03/14/19   0436  03/15/19   0435   INR  0.97  1.01  0.97   PTT   --   29.9  30.7       Other Lab Radiology - reviewed personally, agree with radiology interpretation as above    ASSESSMENT    · Acute encephalopathy - likely alcohol withdrawal/ delirium tremens  · Polysubstance abuse -alcohol and cocaine (urine drug screen +)  · Uncontrolled hy

## 2019-03-15 NOTE — PROGRESS NOTES
[facility]  Progress Note     Diana Forbes Patient Status:  Inpatient    1955 MRN [id number]   [facility] 4SW-A Attending Chelsea Bess MD   Hosp Day # 2 PCP Debi Em     Chief Complaint: AMS      S: Patient    Back on 03/14/19   0436  03/15/19   0435   PTP  13.3  13.7  13.3   INR  0.97  1.01  0.97       Recent Labs   Lab  03/13/19   1747   TROP  <0.045            Imaging: Imaging data reviewed in Epic.     Medications:   • pantoprazole (PROTONIX) IV push  40 mg Intraveno 3/15/2019  [id number]    Patient seen and examined  On Precedex  Did not say much, just 'yeah'    /58   Pulse 99   Temp 98.4 °F (36.9 °C) (Temporal)   Resp (!) 36   Ht 6' 4\" (1.93 m)   Wt 206 lb 2.1 oz (93.5 kg)   SpO2 100%   BMI 25.09 kg/m²   General

## 2019-03-15 NOTE — BH PROGRESS NOTE
Went to see the pt and was informed by his nurse Eron Cosme that he is not assessable yet. AURELIO will check on the pt at a later time.

## 2019-03-16 NOTE — PROGRESS NOTES
[facility]  Progress Note     Yoseph Busch Patient Status:  Inpatient    1955 MRN [id number]   [facility] 4SW-A Attending Trent Dye MD   Hosp Day # 3 PCP Jose Francisco Diaz     Chief Complaint: AMS      S: Patient undergoing 5,000 Units Subcutaneous 2 times per day       ASSESSMENT / PLAN:     1. Acute toxic metabolic encephalopathy d/t DTs  2. Cocaine abuse  3. Asthma   4. Essential hypertension  5.  Diabetes mellitus type 2    Plan:  Continue CIWA protocol  EEG pending  Benny Cables

## 2019-03-16 NOTE — PROGRESS NOTES
[facility]  Progress Note    Susi Stover Patient Status:  Inpatient    1955 MRN [id number]   [facility] 4SW-A Attending Zheng Barger MD   Hosp Day # 3 PCP Jhon Wilkinson     Subjective:  Susi Stover is a(n) 66year old male 03/15/19   0435  03/16/19   0436   RBC  5.32  5.26  4.78   HGB  15.3  15.0  13.3   HCT  47.0  47.4  43.2   MCV  88.3  90.1  90.4   MCH  28.8  28.5  27.8   MCHC  32.6  31.6  30.8*   RDW  12.8  12.4  11.9   NEPRELIM  8.01*  5.46  5.25   WBC  10.9  8.0  7.8

## 2019-03-16 NOTE — PHYSICAL THERAPY NOTE
Orders received and hx and chart reviewed. Pt remains sedated and was displaying agitated behavior per notes. Per Fabio Saleh RN, pt is not appropriate for therapy at this time. Will attempt again tomorrow and as pt is appropriate. Thank you.

## 2019-03-16 NOTE — PROGRESS NOTES
[facility]    Progress Note    Vibha Enrique Patient Status:  Inpatient    1955 MRN [id number]   [facility] 4SW-A Attending Araseli Ahumada MD   Hosp Day # 3 PCP James Burns     Subjective:  Vibha Enrique is a(n) 66year old [facility])      Continue close neuro status monitoring. Wean off sedation as tolerated  Recheck ammonia levels  We will d/c video EEG.  If no improvement would consider a MRI brain  Rest per Pul CC        Jeanette Leggett MD  [facility]

## 2019-03-16 NOTE — PLAN OF CARE
CONFUSION    • Confusion, delirium, dementia or psychosis is improved or at baseline Progressing        Delirium    • Minimize duration of delirium Progressing        GENITOURINARY - ADULT    • Absence of urinary retention Progressing        METABOLIC/FLUI

## 2019-03-16 NOTE — PLAN OF CARE
CARDIOVASCULAR - ADULT    • Maintains optimal cardiac output and hemodynamic stability Completed    • Absence of cardiac arrhythmias or at baseline Completed            Blood pressure has been stable, HR 80'/min NSR.      CONFUSION    • Confusion, delirium

## 2019-03-16 NOTE — PROCEDURES
ELECTROENCEPHALOGRAM REPORT      Patient Name: Lola Calderon  Chart ID: [id number]  Ordering Physician: No name on file. Date of Test: 3/15/2019  Referring Physician:   Patient Diagnosis: AMS.  ETOH withdrawal    HISTORY  63 YR OLD MALE WHO IS A DAILY Jeri Potash

## 2019-03-17 ENCOUNTER — APPOINTMENT (OUTPATIENT)
Dept: GENERAL RADIOLOGY | Facility: HOSPITAL | Age: 64
DRG: 894 | End: 2019-03-17
Attending: INTERNAL MEDICINE
Payer: MEDICARE

## 2019-03-17 PROCEDURE — 71045 X-RAY EXAM CHEST 1 VIEW: CPT | Performed by: INTERNAL MEDICINE

## 2019-03-17 NOTE — BH PROGRESS NOTE
Talked with the pts nurse and she said the pt is not alert and oriented x4 yet. Deepthi Barajas will check on the pt tomorrow.

## 2019-03-17 NOTE — PLAN OF CARE
Assumed care at 1900. Pt drowsy, only oriented to self. Mumbles to himself. CIWA 4-14. On precedex and given PRN ativan. Bilateral wrist restraints and posey in place. Will intermittently follow commands. On room air.  Blanca Alfred APN and respiratory notifie

## 2019-03-17 NOTE — PHYSICAL THERAPY NOTE
Attempted to see pt again today for PT and per RN, Penny Plascencia, pt is not appropriate. Will attempt again as schedule allows. Thank you.

## 2019-03-17 NOTE — PROGRESS NOTES
Subjective:  Janiya Calixto is a(n) 66year old male with acute encephalopathy and Etoh withdrawal  He was more awake this morning and then a while ago started hallucinating and trying to get of the bed and got IV ativan     Objective:  Blood pressure 106/5

## 2019-03-17 NOTE — PROGRESS NOTES
[facility]  Progress Note     Nedra Seats Patient Status:  Inpatient    1955 MRN [id number]   [facility] 4SW-A Attending Alpesh Berger MD   Hosp Day # 4 PCP Richard Enriquez     Chief Complaint: AMS      S: Patient more awake pantoprazole (PROTONIX) IV push  40 mg Intravenous Q24H   • insulin detemir  15 Units Subcutaneous Aron@[employer]   • Insulin Aspart Pen  2-10 Units Subcutaneous Q6H   • hydrALAzine HCl  20 mg Intravenous Once   • Heparin Sodium (Porcine)  5,000 Units Subcu

## 2019-03-17 NOTE — PROCEDURES
ELECTROENCEPHALOGRAM REPORT    Patient Name: Syed Love  Chart ID: [id number]  Ordering Physician: Dr Luke Abad Start Date: 3/15/2019  Referring Physician:   End Date: 3/16/2019  Patient Diagnosis: AMS.     HISTORY  62 Y/O MALE ADMITTED ON 3/13 FOR AMS, HB FILES:  There were no spike files noted. All of these noted events correlates with artifact, including movement, muscle, and electrode artifact.     INTERPRETATION:  This is an mildly abnormal continuous video EEG recording showing mild background slowing

## 2019-03-17 NOTE — SLP NOTE
ADULT SWALLOWING EVALUATION    ASSESSMENT    ASSESSMENT/OVERALL IMPRESSION:  B/S swallow eval completed upon MD order.   Per MD note  History of Present Illness:  Kelly Bennett is a 66year old male admitted with altered mental status with confusion and donnie with thin via cup and straw, puree and cracker. Oral: Oral containment, prep and transit WFL. Functional mastication. No oral residue after the swallow. Pharyngeal: Timely pharyngeal response with adequate hyolaryngeal excursion palpated.   Delayed thro vascularity. Prominent interstitial markings in the lung bases left greater than right. Differential includes bibasilar pneumonia versus atelectasis. No pneumothorax or pleural effusion. Postsurgical changes right shoulder.       SUBJECTIVE   P

## 2019-03-17 NOTE — SLP NOTE
Hold swallow eval per RN. RN will page ST if pt increased alert for assessment later today. Continue NPO. Oral care as tolerated. Sarah Diana M.S.,CCC-SLP  Speech Language Pathologist  Pager [phone]

## 2019-03-17 NOTE — PROGRESS NOTES
[facility]  Progress Note    Youngkristophercorine Coad Patient Status:  Inpatient    1955 MRN [id number]   [facility] 4SW-A Attending Humberto Rao MD   Hosp Day # 4 PCP Irma Carcamo     STATUS UPDATE: Nursing reports some respiratory dis BS   Extremity: No edema, no cyanosis   Neurological: Awake, interactive, no focal deficits    Lab Data Review:  Recent Labs   Lab  03/14/19   0436  03/15/19   0435  03/16/19   0436  03/17/19   0427   RBC  5.32  5.26  4.78  4.56   HGB  15.3  15.0  13.3  13 SR.  3/17/2019  7:32 AM

## 2019-03-17 NOTE — PLAN OF CARE
CONFUSION    • Confusion, delirium, dementia or psychosis is improved or at baseline Progressing        Delirium    • Minimize duration of delirium Progressing        GENITOURINARY - ADULT    • Absence of urinary retention Progressing        Impaired Swall

## 2019-03-18 ENCOUNTER — APPOINTMENT (OUTPATIENT)
Dept: CT IMAGING | Facility: HOSPITAL | Age: 64
DRG: 896 | End: 2019-03-18
Attending: EMERGENCY MEDICINE
Payer: MEDICARE

## 2019-03-18 ENCOUNTER — HOSPITAL ENCOUNTER (INPATIENT)
Facility: HOSPITAL | Age: 64
LOS: 16 days | Discharge: NURSING, REHAB OR LONG-TERM CARE | DRG: 896 | End: 2019-04-03
Attending: EMERGENCY MEDICINE | Admitting: HOSPITALIST
Payer: MEDICARE

## 2019-03-18 DIAGNOSIS — R55 SYNCOPE, NEAR: ICD-10-CM

## 2019-03-18 DIAGNOSIS — J45.21 MILD INTERMITTENT ASTHMA WITH ACUTE EXACERBATION: Primary | ICD-10-CM

## 2019-03-18 DIAGNOSIS — R41.0 CONFUSION: ICD-10-CM

## 2019-03-18 PROCEDURE — 71275 CT ANGIOGRAPHY CHEST: CPT | Performed by: EMERGENCY MEDICINE

## 2019-03-18 PROCEDURE — 99223 1ST HOSP IP/OBS HIGH 75: CPT | Performed by: NURSE PRACTITIONER

## 2019-03-18 PROCEDURE — 99223 1ST HOSP IP/OBS HIGH 75: CPT | Performed by: HOSPITALIST

## 2019-03-18 RX ORDER — DIAZEPAM 5 MG/ML
10 INJECTION, SOLUTION INTRAMUSCULAR; INTRAVENOUS ONCE
Status: COMPLETED | OUTPATIENT
Start: 2019-03-18 | End: 2019-03-18

## 2019-03-18 RX ORDER — MULTIPLE VITAMINS W/ MINERALS TAB 9MG-400MCG
1 TAB ORAL DAILY
Status: DISCONTINUED | OUTPATIENT
Start: 2019-03-18 | End: 2019-03-19

## 2019-03-18 RX ORDER — LOSARTAN POTASSIUM 100 MG/1
100 TABLET ORAL DAILY
Status: DISCONTINUED | OUTPATIENT
Start: 2019-03-18 | End: 2019-04-03

## 2019-03-18 RX ORDER — DEXTROSE MONOHYDRATE 25 G/50ML
50 INJECTION, SOLUTION INTRAVENOUS
Status: DISCONTINUED | OUTPATIENT
Start: 2019-03-18 | End: 2019-04-03

## 2019-03-18 RX ORDER — LORAZEPAM 2 MG/ML
1 INJECTION INTRAMUSCULAR EVERY 30 MIN PRN
Status: DISCONTINUED | OUTPATIENT
Start: 2019-03-18 | End: 2019-03-19

## 2019-03-18 RX ORDER — ALBUTEROL SULFATE 90 UG/1
1 AEROSOL, METERED RESPIRATORY (INHALATION) EVERY 6 HOURS PRN
Status: DISCONTINUED | OUTPATIENT
Start: 2019-03-18 | End: 2019-04-03

## 2019-03-18 RX ORDER — LORAZEPAM 2 MG/ML
2 INJECTION INTRAMUSCULAR
Status: DISCONTINUED | OUTPATIENT
Start: 2019-03-18 | End: 2019-03-18

## 2019-03-18 RX ORDER — LORAZEPAM 2 MG/ML
2 INJECTION INTRAMUSCULAR
Status: DISCONTINUED | OUTPATIENT
Start: 2019-03-18 | End: 2019-03-19

## 2019-03-18 RX ORDER — LORAZEPAM 2 MG/ML
4 INJECTION INTRAMUSCULAR EVERY 10 MIN PRN
Status: DISCONTINUED | OUTPATIENT
Start: 2019-03-18 | End: 2019-03-19

## 2019-03-18 RX ORDER — LORAZEPAM 2 MG/ML
3 INJECTION INTRAMUSCULAR
Status: DISCONTINUED | OUTPATIENT
Start: 2019-03-18 | End: 2019-03-19

## 2019-03-18 RX ORDER — ALFUZOSIN HYDROCHLORIDE 10 MG/1
10 TABLET, EXTENDED RELEASE ORAL
Status: DISCONTINUED | OUTPATIENT
Start: 2019-03-19 | End: 2019-04-03

## 2019-03-18 RX ORDER — SODIUM CHLORIDE 9 MG/ML
INJECTION, SOLUTION INTRAVENOUS CONTINUOUS
Status: DISCONTINUED | OUTPATIENT
Start: 2019-03-18 | End: 2019-03-26

## 2019-03-18 RX ORDER — FOLIC ACID 1 MG/1
1 TABLET ORAL DAILY
Status: DISCONTINUED | OUTPATIENT
Start: 2019-03-18 | End: 2019-03-19

## 2019-03-18 RX ORDER — ONDANSETRON 2 MG/ML
4 INJECTION INTRAMUSCULAR; INTRAVENOUS EVERY 6 HOURS PRN
Status: DISCONTINUED | OUTPATIENT
Start: 2019-03-18 | End: 2019-04-03

## 2019-03-18 RX ORDER — CLONIDINE HYDROCHLORIDE 0.1 MG/1
0.1 TABLET ORAL 3 TIMES DAILY PRN
Status: DISCONTINUED | OUTPATIENT
Start: 2019-03-18 | End: 2019-03-24

## 2019-03-18 RX ORDER — IPRATROPIUM BROMIDE AND ALBUTEROL SULFATE 2.5; .5 MG/3ML; MG/3ML
3 SOLUTION RESPIRATORY (INHALATION) ONCE
Status: COMPLETED | OUTPATIENT
Start: 2019-03-18 | End: 2019-03-18

## 2019-03-18 RX ORDER — MELATONIN
100 DAILY
Status: DISCONTINUED | OUTPATIENT
Start: 2019-03-19 | End: 2019-03-19

## 2019-03-18 RX ORDER — ENOXAPARIN SODIUM 100 MG/ML
40 INJECTION SUBCUTANEOUS DAILY
Status: DISCONTINUED | OUTPATIENT
Start: 2019-03-19 | End: 2019-04-03

## 2019-03-18 RX ORDER — MONTELUKAST SODIUM 10 MG/1
10 TABLET ORAL NIGHTLY
Status: DISCONTINUED | OUTPATIENT
Start: 2019-03-18 | End: 2019-04-03

## 2019-03-18 RX ORDER — METOCLOPRAMIDE HYDROCHLORIDE 5 MG/ML
10 INJECTION INTRAMUSCULAR; INTRAVENOUS EVERY 8 HOURS PRN
Status: DISCONTINUED | OUTPATIENT
Start: 2019-03-18 | End: 2019-03-19

## 2019-03-18 RX ORDER — KETOROLAC TROMETHAMINE 5 MG/ML
2 SOLUTION OPHTHALMIC 3 TIMES DAILY
Status: DISCONTINUED | OUTPATIENT
Start: 2019-03-18 | End: 2019-04-03

## 2019-03-18 RX ORDER — ACETAMINOPHEN 325 MG/1
650 TABLET ORAL EVERY 6 HOURS PRN
Status: DISCONTINUED | OUTPATIENT
Start: 2019-03-18 | End: 2019-03-26

## 2019-03-18 RX ORDER — ATORVASTATIN CALCIUM 20 MG/1
20 TABLET, FILM COATED ORAL NIGHTLY
Status: DISCONTINUED | OUTPATIENT
Start: 2019-03-18 | End: 2019-04-03

## 2019-03-18 NOTE — PROGRESS NOTES
[facility]  Progress Note    Lisseth Stuart Patient Status:  Inpatient    1955 MRN [id number]   [facility] 4SW-A Attending Treasure Puentes MD   Hosp Day # 5 PCP Davon Maravilla     Subjective:  Lisseth Stuart is a(n) 66year old male 28.8  28.5  27.8  29.2  28.2   MCHC  32.6  31.6  30.8*  32.3  31.2   RDW  12.8  12.4  11.9  11.9  11.9   NEPRELIM  8.01*  5.46  5.25   --    --    WBC  10.9  8.0  7.8  6.6  5.7   PLT  258.0  221.0  224.0  186.0  194.0     Recent Labs   Lab  03/16/19   043

## 2019-03-18 NOTE — PHYSICAL THERAPY NOTE
Attempted to see pt. Spoke with nursing, Lata Fowler, who states pt is not appropriate at this time, as he is anxious and threatening to leave. She has a call into to Dr. Hay Moreno. Inpt PT and OT will currently hold, and will cont to follow as appropriate.
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
## 2019-03-18 NOTE — SLP NOTE
Attempted to see pt this afternoon. Spoke with nursing, Barksdale Nam, who states pt is not appropriate at this time, and may leave AMA per staff report.  ST will put on hold and follow patient tomorrow for meal assessment of MS-chopped diet and thin liquids (per BS

## 2019-03-18 NOTE — PROGRESS NOTES
[facility]  Progress Note     Kip Font Patient Status:  Inpatient    1955 MRN [id number]   [facility] 4SW-A Attending Himanshu Lam MD   Hosp Day # 5 PCP Salma Sotelo     Chief Complaint: AMS      S: Patient  Pt on alma rosa 03/13/19   1747   TROP  <0.045            Imaging: Imaging data reviewed in Epic.     Medications:   • insulin detemir  15 Units Subcutaneous Daily   • Oxymetazoline HCl  1 spray Each Nare Q12H   • Fluticasone Propionate  1 spray Each Nare Q12H   • [place] (Temporal)   Resp 20   Ht 6' 4\" (1.93 m)   Wt 208 lb 1.8 oz (94.4 kg)   SpO2 99%   BMI 25.33 kg/m²   General NAD  CVS S1 S2  Lungs Diminished  Abd Soft BS (+)  Ext No edema    Plan:  Start Librium - he is off Precedex  Restart Lyrica  Restart ARB at half
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
## 2019-03-18 NOTE — PLAN OF CARE
Assumed care at 1900. Pt sedated on precedex. Responds to verbal stimuli. Oriented to self and occasionally place. Mumbles to himself. CIWA assessments ongoing. Did not require any ativan. Will intermittently follow simple commands. On room air.  Receivi

## 2019-03-18 NOTE — OCCUPATIONAL THERAPY NOTE
Attempted to see pt. Spoke with nursing, Sofia Pinedo, who states pt is not appropriate at this time, as he is anxious and threatening to leave. She has a call into to Dr. Ana Rogers. Inpt PT and OT will currently hold, and will cont to follow as appropriate.

## 2019-03-18 NOTE — PLAN OF CARE
CONFUSION    • Confusion, delirium, dementia or psychosis is improved or at baseline Progressing        METABOLIC/FLUID AND ELECTROLYTES - ADULT    • Electrolytes maintained within normal limits Progressing        RESPIRATORY - ADULT    • Achieves optimal notified. Patient was retrieved by friend \"Don\".

## 2019-03-18 NOTE — PROGRESS NOTES
[id number] [facility] Progress Note    Syed Love Patient Status:  Inpatient    1955 MRN [id number]   [facility] 4SW-A Attending Lawrence Rivas MD   [facility] Day # 5 PCP Alon Speed         Subjective:  Syed Love is a(n Imaging/Diagnostic:    • insulin detemir  15 Units Subcutaneous Daily   • Oxymetazoline HCl  1 spray Each Nare Q12H   • Fluticasone Propionate  1 spray Each Nare Q12H   • ipratropium-albuterol  3 mL Nebulization 6 times per day   • artificial tears AM  Spectra [id number]  Neurology Attending Addendum:  I have seen patient independently, reviewed history, labs and imaging, and agree with above note with following additions:  S: more awake alert  O: /76   Pulse 69   Temp 98.4 °F (36.9 °C) (Temporal)

## 2019-03-19 PROCEDURE — 90792 PSYCH DIAG EVAL W/MED SRVCS: CPT | Performed by: OTHER

## 2019-03-19 RX ORDER — HYDRALAZINE HYDROCHLORIDE 20 MG/ML
20 INJECTION INTRAMUSCULAR; INTRAVENOUS ONCE
Status: COMPLETED | OUTPATIENT
Start: 2019-03-19 | End: 2019-03-19

## 2019-03-19 RX ORDER — HALOPERIDOL 5 MG/ML
3 INJECTION INTRAMUSCULAR EVERY 4 HOURS PRN
Status: DISCONTINUED | OUTPATIENT
Start: 2019-03-19 | End: 2019-03-21

## 2019-03-19 RX ORDER — HYDRALAZINE HYDROCHLORIDE 20 MG/ML
10 INJECTION INTRAMUSCULAR; INTRAVENOUS
Status: DISCONTINUED | OUTPATIENT
Start: 2019-03-19 | End: 2019-03-26

## 2019-03-19 RX ORDER — DEXMEDETOMIDINE HYDROCHLORIDE 4 UG/ML
INJECTION, SOLUTION INTRAVENOUS CONTINUOUS
Status: DISPENSED | OUTPATIENT
Start: 2019-03-19 | End: 2019-03-25

## 2019-03-19 RX ORDER — HYDRALAZINE HYDROCHLORIDE 20 MG/ML
INJECTION INTRAMUSCULAR; INTRAVENOUS
Status: DISPENSED
Start: 2019-03-19 | End: 2019-03-19

## 2019-03-19 RX ORDER — IPRATROPIUM BROMIDE AND ALBUTEROL SULFATE 2.5; .5 MG/3ML; MG/3ML
3 SOLUTION RESPIRATORY (INHALATION)
Status: DISCONTINUED | OUTPATIENT
Start: 2019-03-19 | End: 2019-03-24

## 2019-03-19 RX ORDER — ALBUTEROL SULFATE 90 UG/1
2 AEROSOL, METERED RESPIRATORY (INHALATION) 2 TIMES DAILY
Status: DISCONTINUED | OUTPATIENT
Start: 2019-03-19 | End: 2019-03-27

## 2019-03-19 NOTE — H&P
[facility]  History and Physical     Johana Francois Patient Status:  Emergency    1955 MRN [id number]   Location [facility] Attending Mihir Dooley MD   Hosp Day # 0 PCP Alverna Serum     Chief Complaint: confusion Cholecalciferol 5000 units Oral Tab Take 5,000 Units by mouth daily. Disp:  Rfl:    traZODone HCl 50 MG Oral Tab Take 75 mg by mouth nightly. Disp:  Rfl:    atorvastatin 20 MG Oral Tab Take 20 mg by mouth nightly.  Disp:  Rfl:    Ipratropium-Albuterol (CO cyanosis. Integument: No rashes or lesions. Psychiatric: Appropriate mood and affect.       Diagnostic Data:      Labs:  Recent Labs   Lab  03/13/19   1747  03/14/19   8404  03/15/19   0435  03/16/19   0436  03/17/19   0427  03/18/19   0449  03/18/19   1 Enoch Doyle MD  3/18/2019
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
## 2019-03-19 NOTE — PROGRESS NOTES
[id number] [facility] Patient Status:  Inpatient    1955 MRN [id number]   [facility] 4SW-A Attending Maycol Rico MD   Hosp Day # 0 PCP Raquel Spatz     Critical Care Progress Note     S: Left AMA earlier tonight and b Lungs: clear   Chest wall: No tenderness or deformity. Heart: Regular rate and rhythm, normal S1S2, no murmur. Abdomen: soft, non-tender, non-distended, positive BS. Extremity: no edema   Skin: No rashes or lesions.        Lab Results   Component Lydia

## 2019-03-19 NOTE — CONSULTS
[facility]  Report of Consultation    Dane Feeling Patient Status:  Inpatient    1955 MRN [id number]   [facility] 4SW-A Attending Germaine [address] [place] [address] Day # 1 PCP Cricket Hartmann     Reason for Consultation: Nuris Nguyen History:   Procedure Laterality Date   • COLON SURGERY  Aug. 2012   • HERNIA SURGERY  6/16/13    Repair Recurrent Vent./Inc Hernia by Dr. Amaya Montano   • OTHER SURGICAL HISTORY  2/2014    (R) Shoulder Replacement   • REPAIR ING HERNIA,5+Y/O,REDUCIBL  Nov. 2012 for a complete review of systems    Vital signs:  BP (!) 183/65   Pulse 119   Temp 99.8 °F (37.7 °C) (Temporal)   Resp (!) 27   Ht 6' 1\" (1.854 m)   Wt 204 lb 5.9 oz (92.7 kg)   SpO2 95%   BMI 26.96 kg/m²     Intake/Output:    Intake/Output Summary (Last 4.2  3.9  3.6   CL  107  103  108*   CO2  26.0  23.0  22.0     Recent Labs   Lab  03/14/19   1642   ABGPHT  7.42   GRNLMJ6A  40   YFGED3Z  64*   ABGHCO3  25.1   ABGBE  0.6   TEMP  98.6   NAEL  Positive   SITE  Right Radial   DEV  Room Air   THGB  15.5

## 2019-03-19 NOTE — PROGRESS NOTES
[facility]  Progress Note     Gail Batista Patient Status:  Inpatient    1955 MRN [id number]   [facility] 4SW-A Attending Jadon Looney MD   Hosp Day # 1 PCP Jazmin Richards     Chief Complaint: confusion     S: Patient   On 107  103  108*   CO2  27.0   < >  26.0  23.0  22.0   ALKPHO  72   --   71  88   --    AST  10*   --   11*  20   --    ALT  15*   --   15*  18   --    BILT  1.0   --   1.1  1.4   --    TP  6.8   --   6.5  7.8   --     < > = values in this interval not displ    Quality:  · DVT Prophylaxis: lovenox   · CODE status: full  · Jones: none       Plan of care:   .2   Wbc normal  Cbc am   Pt has voided to per RN  despite sedation   No family at bedside     Estimated date of discharge: tbd  Discharge is depende

## 2019-03-19 NOTE — PLAN OF CARE
Pt admitted to ICU, unable to ambulate from stretcher to bed, very unsteady gait. Disoriented/confused x4. Redirectable at times however pt very quickly became agitated and threatening to leave.  He repeatedly states \"I need to get out of here and get my k

## 2019-03-19 NOTE — CONSULTS
[facility]  Report of Psychiatric Consultation    Irma Chacon Patient Status:  Inpatient    1955 MRN [id number]   [facility] 4SW-A Attending Faby De La O MD   [facility] Day # 1 PCP Angelita Núñez     Date of Admission: 3/18/19  Da of IV Ativan. He was also on precedex for agitation. He had an EEG that showed slowing consistent with delirium; no seizure activity. Head CT showed no acute bleed. TSH unremarkable. He was evaluated by the psych service on 3/18/19.  He felt tired and a Hernia by Dr. Dulce Reeder   • OTHER SURGICAL HISTORY  2/2014    (R) Shoulder Replacement   • REPAIR ING HERNIA,5+Y/O,REDUCIBL  Nov. 2012     Family History   Problem Relation Age of Onset   • Cancer Mother    • Heart Disorder Father    • Cancer Father    • Emelina Oral, Daily with breakfast  •  losartan (COZAAR) tab 100 mg, 100 mg, Oral, Daily  •  glucose (DEX4) oral liquid 15 g, 15 g, Oral, Q15 Min PRN **OR** Glucose-Vitamin C (DEX-4) 4-6 GM-MG chewable tab 4 tablet, 4 tablet, Oral, Q15 Min PRN **OR** dextrose 50 % Knowledge: unable to recite name of [place] president    Insight: impaired   Judgment: impaired    Laboratory Data:  Lab Results   Component Value Date    WBC 6.6 03/19/2019    HGB 12.4 03/19/2019    HCT 39.2 03/19/2019    .0 03/19/2019    CREATSERUM 0.8

## 2019-03-19 NOTE — PLAN OF CARE
DRUG ABUSE/DETOX    • Will have no detox symptoms and will verbalize plan for changing drug-related behavior Not Progressing        NEUROLOGICAL - ADULT    • Achieves stable or improved neurological status Not Progressing          CARDIOVASCULAR - ADULT
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
## 2019-03-19 NOTE — ED NOTES
Report given to [facility] [id number] for room 458
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
## 2019-03-20 PROCEDURE — 99232 SBSQ HOSP IP/OBS MODERATE 35: CPT | Performed by: OTHER

## 2019-03-20 PROCEDURE — 99232 SBSQ HOSP IP/OBS MODERATE 35: CPT | Performed by: STUDENT IN AN ORGANIZED HEALTH CARE EDUCATION/TRAINING PROGRAM

## 2019-03-20 RX ORDER — FOLIC ACID/VIT B COMPLEX AND C 400 MCG
1 TABLET ORAL DAILY
Status: DISCONTINUED | OUTPATIENT
Start: 2019-03-20 | End: 2019-04-03

## 2019-03-20 RX ORDER — MULTIPLE VITAMINS W/ MINERALS TAB 9MG-400MCG
1 TAB ORAL DAILY
Status: DISCONTINUED | OUTPATIENT
Start: 2019-03-20 | End: 2019-04-03

## 2019-03-20 RX ORDER — QUETIAPINE 25 MG/1
50 TABLET, FILM COATED ORAL NIGHTLY
Status: DISCONTINUED | OUTPATIENT
Start: 2019-03-20 | End: 2019-03-22

## 2019-03-20 RX ORDER — KETOROLAC TROMETHAMINE 30 MG/ML
30 INJECTION, SOLUTION INTRAMUSCULAR; INTRAVENOUS EVERY 6 HOURS PRN
Status: DISPENSED | OUTPATIENT
Start: 2019-03-20 | End: 2019-03-22

## 2019-03-20 RX ORDER — MELATONIN
100
Status: DISCONTINUED | OUTPATIENT
Start: 2019-03-20 | End: 2019-04-03

## 2019-03-20 RX ORDER — FOLIC ACID 1 MG/1
1 TABLET ORAL 2 TIMES DAILY
Status: DISCONTINUED | OUTPATIENT
Start: 2019-03-20 | End: 2019-04-03

## 2019-03-20 NOTE — PROGRESS NOTES
[facility]  Progress Note     Samantha Cain Patient Status:  Inpatient    1955 MRN [id number]   [facility] 4SW-A Attending Alexandria Choudhury MD   Hosp Day # 2 PCP Martin Horne     Chief Complaint: Confusion     S: Patient  Repor 89  108  114   GFRNAA  95  77  94  99   CA  8.7  9.2  8.9  8.9   ALB  2.9*  3.5   --   3.1*   NA  140  138  141  141   K  4.2  3.9  3.6  3.7   CL  107  103  108*  108*   CO2  26.0  23.0  22.0  24.0   ALKPHO  71  88   --   74   AST  11*  20   --   11*   ALT dependent on: clinical  At this point Mr. Cardoza is expected to be discharge to: tbd    Plan of care discussed with pt, RN    Compa Ellis MD          **Certification      PHYSICIAN Certification of Need for Inpatient Hospitalization - Initial Certification

## 2019-03-20 NOTE — PROGRESS NOTES
[facility]  Progress Note    John Posada Patient Status:  Inpatient    1955 MRN [id number]   [facility] 4SW-A Attending Santo Shepard MD   Hosp Day # 2 PCP Hay Li     Subjective:  John Posada is a(n) 66year old male. 141  141   K  3.9  3.6  3.7   CL  103  108*  108*   CO2  23.0  22.0  24.0     Recent Labs   Lab  03/14/19   1642   ABGPHT  7.42   [id number]  40   [id number]  64*   ABGHCO3  25.1   ABGBE  0.6   TEMP  98.6   NAEL  Positive   SITE  Right Radial   DEV  Room Air

## 2019-03-20 NOTE — PLAN OF CARE
Pt is arousable to voice but drowsy, refuses to follow commands, he is able to converse but he is very angry, irritable, negative and suspicious towards staff (ex. glass of water was offered to pt, and pt declined, insisting that we placed something in the

## 2019-03-20 NOTE — PROGRESS NOTES
[facility]  Report of Psychiatric Progress Note    Irma Chacon Patient Status:  Inpatient    1955 MRN [id number]   [facility] 4SW-A Attending Faby De La O MD   Hosp Day # 2 PCP Angelita Núñez     Date of Admission: 3/18/19  D with delirium; no seizure activity. Head CT showed no acute bleed. TSH unremarkable. He was evaluated by the psych service on 3/18/19. He felt tired and anxious and denied hopelessness or suicidal ideation.  He wanted to go home to be with his wife and asthma, unspecified    • Pneumonia    • Type II or unspecified type diabetes mellitus without mention of complication, not stated as uncontrolled      Past Surgical History:   Procedure Laterality Date   • COLON SURGERY  Aug. 2012   • HERNIA SURGERY  6/16/ nebulizer solution 3 mL, 3 mL, Nebulization, 6 times per day  •  haloperidol lactate (HALDOL) 5 MG/ML injection 3 mg, 3 mg, Intravenous, Q4H PRN  •  Insulin Aspart Pen (NOVOLOG) 100 UNIT/ML flexpen 1-68 Units, 1-68 Units, Subcutaneous, Q6H  •  sodium chlor POSITIVE for paranoid ideation    Orientation: self, hospital, month, year  Attention and Concentration:   poor and distracted, unable to spell WORLD backwards. 100-7 is \"3\".    Memory: impaired recent  Language: able to name objects  Fund of Knowledge: a

## 2019-03-21 PROCEDURE — 99232 SBSQ HOSP IP/OBS MODERATE 35: CPT | Performed by: OTHER

## 2019-03-21 RX ORDER — HALOPERIDOL 5 MG/ML
5 INJECTION INTRAMUSCULAR EVERY 4 HOURS PRN
Status: DISCONTINUED | OUTPATIENT
Start: 2019-03-21 | End: 2019-03-25

## 2019-03-21 NOTE — CM/SW NOTE
[employer] called back to SW yesterday before I arrived at work. Sw called [employer]CF[employer] again yesterday- they called back again this morning before I arrived to work. Sw called [employer] again. Neeru Aponte  /Dischage Planner  [phone]  Pager [phone]
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
## 2019-03-21 NOTE — PROGRESS NOTES
[facility]  Progress Note    Vibha Enrique Patient Status:  Inpatient    1955 MRN [id number]   [facility] 4SW-A Attending Jadon Monzon MD   Hosp Day # 3 PCP James Burns     Subjective:  Vibha Enrique is a(n) 66year old male. 5. 90  4.77  3.06   --    WBC  7.5  6.6  4.9  6.2   PLT  252.0  263.0  218.0  249.0     Recent Labs   Lab  03/19/19   0439  03/20/19   0433  03/21/19   0448   GLU  139*  105*  166*   BUN  11  9  12   CREATSERUM  0.86  0.75  0.93   GFRAA  108  114  102   GFR

## 2019-03-21 NOTE — PROGRESS NOTES
[facility]ITAL[facility]  Progress Note     Lisseth Range Patient Status:  Inpatient    1955 MRN [id number]   [facility] 4SW-A Attending America Aaron MD   Hosp Day # 3 PCP Davon Maravilla     Chief Complaint: Confusion     S: Patient v agit mg/dL). Recent Labs   Lab  03/15/19   0435  03/19/19   0439   PTP  13.3  14.3   INR  0.97  1.06       Recent Labs   Lab  03/18/19   1857   TROP  <0.045            Imaging: Imaging data reviewed in Epic.     Medications:   • valproate  500 mg Intravenous

## 2019-03-21 NOTE — PLAN OF CARE
Pt is awake and alert, however pt is very irritable/angry. He refuses to participate in care or in conversation, refuses to make eye contact or acknowledge RN presence in the room.  Unable to accurately assess orientation, however pt does converse at times,

## 2019-03-22 PROCEDURE — 99232 SBSQ HOSP IP/OBS MODERATE 35: CPT | Performed by: STUDENT IN AN ORGANIZED HEALTH CARE EDUCATION/TRAINING PROGRAM

## 2019-03-22 PROCEDURE — 99232 SBSQ HOSP IP/OBS MODERATE 35: CPT | Performed by: OTHER

## 2019-03-22 RX ORDER — QUETIAPINE 25 MG/1
75 TABLET, FILM COATED ORAL NIGHTLY
Status: DISCONTINUED | OUTPATIENT
Start: 2019-03-22 | End: 2019-03-25

## 2019-03-22 RX ORDER — LORAZEPAM 2 MG/ML
2 INJECTION INTRAMUSCULAR ONCE
Status: COMPLETED | OUTPATIENT
Start: 2019-03-22 | End: 2019-03-22

## 2019-03-22 RX ORDER — LORAZEPAM 2 MG/ML
INJECTION INTRAMUSCULAR
Status: DISPENSED
Start: 2019-03-22 | End: 2019-03-22

## 2019-03-22 RX ORDER — LORAZEPAM 2 MG/ML
2 INJECTION INTRAMUSCULAR 2 TIMES DAILY PRN
Status: DISCONTINUED | OUTPATIENT
Start: 2019-03-22 | End: 2019-03-25
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
## 2019-03-22 NOTE — PROGRESS NOTES
[facility]  Progress Note    Alyse Morales Patient Status:  Inpatient    1955 MRN [id number]   [facility] 4SW-A Attending Bunny Blake MD   Hosp Day # 4 PCP Celena Vera     Subjective:  Alyse Morales is a(n) 66year old male. 0436   RBC  4.74  4.41  4.16*  4.27*  4.19*   HGB  13.6  12.4*  12.1*  12.4*  11.7*   HCT  42.1  39.2  36.9*  38.0*  37.0*   MCV  88.8  88.9  88.7  89.0  88.3   MCH  28.7  28.1  29.1  29.0  27.9   MCHC  32.3  31.6  32.8  32.6  31.6   RDW  12.1  12.1  12.5

## 2019-03-22 NOTE — PROGRESS NOTES
[facility]  Progress Note     Yanira Oxana Patient Status:  Inpatient    1955 MRN [id number]   [facility] 4SW-A Attending Gelacio Alarcon MD   Hosp Day # 4 PCP Lisa Larkin     Chief Complaint: Confusion     S: Patient  Anya Shawandrea interval not displayed. Estimated Creatinine Clearance: 106.9 mL/min (based on SCr of 0.82 mg/dL).     Recent Labs   Lab  03/19/19   0439   PTP  14.3   INR  1.06       Recent Labs   Lab  03/18/19   1857   TROP  <0.045          Imaging: Imaging data re Dr Christin Blue, NP     Pt drowsy  GA NAD, awakens to name- asks about time  CV RRR  Pulm CTA  No LE Edema  Plan of care as above  Kathy Rainey MD

## 2019-03-22 NOTE — CM/SW NOTE
MSW received call from [place] at [place] [phone] who took MSW report this morning at 9:30. They will be following up with the report within 24 hours. Case number for mandated report: [id number].     Silvio Fuller LCSW
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
## 2019-03-23 PROCEDURE — 99232 SBSQ HOSP IP/OBS MODERATE 35: CPT | Performed by: STUDENT IN AN ORGANIZED HEALTH CARE EDUCATION/TRAINING PROGRAM

## 2019-03-23 NOTE — PLAN OF CARE
Received pt this am resting in bed. Refusing meals/ medications. Attempted to swab mouth, pt purses his lips and says \"no\". precedex gtt for sedation. Tried just ordering patient food, he refused to open his mouth and eat it.        Delirium    • M

## 2019-03-23 NOTE — PROGRESS NOTES
[facility]  Progress Note    Irma Chacon Patient Status:  Inpatient    1955 MRN [id number]   [facility] 4SW-A Attending Radha Skelton MD   Hosp Day # 5 PCP Angelita Núñez     Subjective:  Irma Chacon is a(n) 66year old male. 0436  03/23/19   0554   RBC  4.41  4.16*  4.27*  4.19*  4.53   HGB  12.4*  12.1*  12.4*  11.7*  12.9*   HCT  39.2  36.9*  38.0*  37.0*  40.2   MCV  88.9  88.7  89.0  88.3  88.7   MCH  28.1  29.1  29.0  27.9  28.5   MCHC  31.6  32.8  32.6  31.6  32.1   RDW

## 2019-03-23 NOTE — PROGRESS NOTES
[facility]  Progress Note     Johana Alu Patient Status:  Inpatient    1955 MRN [id number]   [facility] 4SW-A Attending Sam[place] Berg MD   Hosp Day # 5 PCP Alverna Serum     Chief Complaint: Confusion     S: Patient  W/ on 3.5   --   3.1*   --    --    --    NA  140  138   < >  141  144  143  143   K  4.2  3.9   < >  3.7  3.6  4.1  4.0   CL  107  103   < >  108*  109*  110*  108*   CO2  26.0  23.0   < >  24.0  28.0  25.0  26.0   ALKPHO  71  88   --   74   --    --    --    A Lovenox  · CODE status: full  · Jones: no  · Central line: no    Will the patient be referred to TCC on discharge?: no  Estimated date of discharge: tbd  Discharge is dependent on: clinical  At this point Mr. Cardoza is expected to be discharge to: tbd      G

## 2019-03-23 NOTE — PLAN OF CARE
CARDIOVASCULAR - ADULT    • Absence of cardiac arrhythmias or at baseline Adequate for Discharge          DRUG ABUSE/DETOX    • Will have no detox symptoms and will verbalize plan for changing drug-related behavior Not Progressing        NEUROLOGICAL - KORY

## 2019-03-24 ENCOUNTER — APPOINTMENT (OUTPATIENT)
Dept: GENERAL RADIOLOGY | Facility: HOSPITAL | Age: 64
DRG: 896 | End: 2019-03-24
Attending: INTERNAL MEDICINE
Payer: MEDICARE

## 2019-03-24 PROCEDURE — 99232 SBSQ HOSP IP/OBS MODERATE 35: CPT | Performed by: STUDENT IN AN ORGANIZED HEALTH CARE EDUCATION/TRAINING PROGRAM

## 2019-03-24 PROCEDURE — 71045 X-RAY EXAM CHEST 1 VIEW: CPT | Performed by: INTERNAL MEDICINE

## 2019-03-24 RX ORDER — IPRATROPIUM BROMIDE AND ALBUTEROL SULFATE 2.5; .5 MG/3ML; MG/3ML
3 SOLUTION RESPIRATORY (INHALATION)
Status: DISCONTINUED | OUTPATIENT
Start: 2019-03-24 | End: 2019-03-26

## 2019-03-24 NOTE — PLAN OF CARE
Resting in bed upon initial assessment. Open eyes spontaneously for brief periods of time, still a lot of mumbling with speech. Verbalized his , and name but unable to state place, year, or circumstance for being here.  Most conversations he has with his

## 2019-03-24 NOTE — PROGRESS NOTES
[facility]  Progress Note     Kip Font Patient Status:  Inpatient    1955 MRN [id number]   [facility] 4SW-A Attending Parker Pacheco MD   Hosp Day # 6 PCP Salma Sotelo     Chief Complaint: Confusion     S: Patient  Still --   3.1*   --    --    --    --    NA  140  138   < >  141   < >  143  143  140   K  4.2  3.9   < >  3.7   < >  4.1  4.0  4.2   CL  107  103   < >  108*   < >  110*  108*  109*   CO2  26.0  23.0   < >  24.0   < >  25.0  26.0  21.0   ALKPHO  71  88   -- enteral feed  Sedation for anxiety      Quality:  · DVT Prophylaxis: Lovenox  · CODE status: full  · Jones: no  · Central line: no    Will the patient be referred to TCC on discharge?: no  Estimated date of discharge: tbd  Discharge is dependent on: clinic

## 2019-03-24 NOTE — PROGRESS NOTES
[facility]  Progress Note    Janiya Calixto Patient Status:  Inpatient    1955 MRN [id number]   [facility] 4SW-A Attending Judi Sanchez MD   [facility] Day # 6 PCP Jory Simmons     Subjective:  Janiya Calixto is a(n) 66year old male. 3362  03/20/19   0433   03/22/19   0436  03/23/19   0554  03/24/19   0446   RBC  4.41  4.16*   < >  4.19*  4.53  4.44   HGB  12.4*  12.1*   < >  11.7*  12.9*  12.6*   HCT  39.2  36.9*   < >  37.0*  40.2  39.6   MCV  88.9  88.7   < >  88.3  88.7  89.2   MCH

## 2019-03-24 NOTE — PLAN OF CARE
Received pt this am resting in bed comfortably. On precedex for sedation. Restrained. VSS. Pt mumbles a lot, says things that do not make sense to his current situation. Dobhoff placed per order, TF started per dietitian rec.     Delirium    • Minimize

## 2019-03-24 NOTE — PLAN OF CARE
RESPIRATORY - ADULT    • Achieves optimal ventilation and oxygenation Adequate for Discharge          Delirium    • Minimize duration of delirium Not Progressing        DRUG ABUSE/DETOX    • Will have no detox symptoms and will verbalize plan for changing

## 2019-03-24 NOTE — DIETARY NOTE
NUTRITION INITIAL ASSESSMENT    Pt is at moderate nutrition risk. Pt does not meet malnutrition criteria.     NUTRITION DIAGNOSIS/PROBLEM:    Inadequate oral intake related to inability to consume sufficient energy as evidenced by agitation, need for TF rec kg:  Calories: 8973-3994 calories/day (22-27 calories per kg)  Protein: 100-125 grams protein/day (1.2-1.5 grams protein per kg)  Fluid: ~1 ml/kcal or per MD discretion    MONITOR AND EVALUATE/NUTRITION GOALS:    3. No signs of skin breakdown  4.  Maintain

## 2019-03-25 ENCOUNTER — APPOINTMENT (OUTPATIENT)
Dept: GENERAL RADIOLOGY | Facility: HOSPITAL | Age: 64
DRG: 896 | End: 2019-03-25
Attending: NURSE PRACTITIONER
Payer: MEDICARE

## 2019-03-25 ENCOUNTER — APPOINTMENT (OUTPATIENT)
Dept: GENERAL RADIOLOGY | Facility: HOSPITAL | Age: 64
DRG: 896 | End: 2019-03-25
Attending: STUDENT IN AN ORGANIZED HEALTH CARE EDUCATION/TRAINING PROGRAM
Payer: MEDICARE

## 2019-03-25 PROCEDURE — 71045 X-RAY EXAM CHEST 1 VIEW: CPT | Performed by: STUDENT IN AN ORGANIZED HEALTH CARE EDUCATION/TRAINING PROGRAM

## 2019-03-25 PROCEDURE — 74018 RADEX ABDOMEN 1 VIEW: CPT | Performed by: NURSE PRACTITIONER

## 2019-03-25 RX ORDER — HALOPERIDOL 5 MG/ML
3 INJECTION INTRAMUSCULAR EVERY 4 HOURS PRN
Status: DISCONTINUED | OUTPATIENT
Start: 2019-03-25 | End: 2019-04-01

## 2019-03-25 RX ORDER — METOCLOPRAMIDE HYDROCHLORIDE 5 MG/ML
10 INJECTION INTRAMUSCULAR; INTRAVENOUS EVERY 6 HOURS PRN
Status: DISCONTINUED | OUTPATIENT
Start: 2019-03-25 | End: 2019-03-27

## 2019-03-25 RX ORDER — LORAZEPAM 2 MG/ML
1 INJECTION INTRAMUSCULAR 2 TIMES DAILY PRN
Status: DISCONTINUED | OUTPATIENT
Start: 2019-03-25 | End: 2019-04-01

## 2019-03-25 RX ORDER — QUETIAPINE 100 MG/1
100 TABLET, FILM COATED ORAL NIGHTLY
Status: DISCONTINUED | OUTPATIENT
Start: 2019-03-25 | End: 2019-03-26

## 2019-03-25 RX ORDER — POTASSIUM CHLORIDE 1.5 G/1.77G
40 POWDER, FOR SOLUTION ORAL ONCE
Status: COMPLETED | OUTPATIENT
Start: 2019-03-25 | End: 2019-03-25

## 2019-03-25 RX ORDER — SODIUM CHLORIDE 9 MG/ML
INJECTION, SOLUTION INTRAVENOUS CONTINUOUS
Status: DISCONTINUED | OUTPATIENT
Start: 2019-03-25 | End: 2019-03-28
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
## 2019-03-25 NOTE — PROGRESS NOTES
[facility]  Progress Note    Vibha Enrique Patient Status:  Inpatient    1955 MRN [id number]   [facility] 4SW-A Attending Jadon Monzon MD   [facility] Day # 7 PCP James Burns     Subjective:  Vibha Enrique is a(n) 66year old male. 88.7  89.2  89.6   MCH  28.1  29.1   < >  28.5  28.4  28.6   MCHC  31.6  32.8   < >  32.1  31.8  31.9   RDW  12.1  12.5   < >  12.2  12.1  12.1   NEPRELIM  4.77  3.06   --   5.56   --    --    WBC  6.6  4.9   < >  7.1  6.7  7.3   PLT  263.0  218.0   < >  3

## 2019-03-25 NOTE — PLAN OF CARE
CARDIOVASCULAR - ADULT    • Maintains optimal cardiac output and hemodynamic stability Adequate for Discharge    • Absence of cardiac arrhythmias or at baseline Adequate for Discharge          Delirium    • Minimize duration of delirium Not Progressing

## 2019-03-25 NOTE — PLAN OF CARE
Received pt this am resting comfortably in bed. On precedex gtt; to attempt to wean sedation today per psych. Pt's wife to come and visit today ~noon and spend time with patient/ hopefully assist with reorienting.  Bilateral wrist restraints/ posey vest. NG

## 2019-03-26 ENCOUNTER — APPOINTMENT (OUTPATIENT)
Dept: GENERAL RADIOLOGY | Facility: HOSPITAL | Age: 64
DRG: 896 | End: 2019-03-26
Attending: NURSE PRACTITIONER
Payer: MEDICARE

## 2019-03-26 PROCEDURE — 99232 SBSQ HOSP IP/OBS MODERATE 35: CPT | Performed by: OTHER

## 2019-03-26 PROCEDURE — 71045 X-RAY EXAM CHEST 1 VIEW: CPT | Performed by: NURSE PRACTITIONER

## 2019-03-26 PROCEDURE — 99233 SBSQ HOSP IP/OBS HIGH 50: CPT | Performed by: STUDENT IN AN ORGANIZED HEALTH CARE EDUCATION/TRAINING PROGRAM

## 2019-03-26 RX ORDER — METOPROLOL TARTRATE 5 MG/5ML
5 INJECTION INTRAVENOUS ONCE
Status: COMPLETED | OUTPATIENT
Start: 2019-03-26 | End: 2019-03-26

## 2019-03-26 RX ORDER — IPRATROPIUM BROMIDE AND ALBUTEROL SULFATE 2.5; .5 MG/3ML; MG/3ML
3 SOLUTION RESPIRATORY (INHALATION)
Status: DISCONTINUED | OUTPATIENT
Start: 2019-03-26 | End: 2019-04-03

## 2019-03-26 RX ORDER — HYDRALAZINE HYDROCHLORIDE 20 MG/ML
10 INJECTION INTRAMUSCULAR; INTRAVENOUS ONCE
Status: COMPLETED | OUTPATIENT
Start: 2019-03-26 | End: 2019-03-26

## 2019-03-26 RX ORDER — MAGNESIUM SULFATE HEPTAHYDRATE 40 MG/ML
2 INJECTION, SOLUTION INTRAVENOUS ONCE
Status: COMPLETED | OUTPATIENT
Start: 2019-03-26 | End: 2019-03-26

## 2019-03-26 RX ORDER — HYDRALAZINE HYDROCHLORIDE 20 MG/ML
20 INJECTION INTRAMUSCULAR; INTRAVENOUS EVERY 4 HOURS PRN
Status: DISCONTINUED | OUTPATIENT
Start: 2019-03-26 | End: 2019-04-03

## 2019-03-26 RX ORDER — ACETAMINOPHEN 650 MG/1
650 SUPPOSITORY RECTAL EVERY 6 HOURS PRN
Status: DISCONTINUED | OUTPATIENT
Start: 2019-03-26 | End: 2019-04-03

## 2019-03-26 RX ORDER — ACETAMINOPHEN 650 MG/1
650 SUPPOSITORY RECTAL ONCE
Status: COMPLETED | OUTPATIENT
Start: 2019-03-26 | End: 2019-03-26

## 2019-03-26 RX ORDER — DEXMEDETOMIDINE HYDROCHLORIDE 4 UG/ML
INJECTION, SOLUTION INTRAVENOUS CONTINUOUS
Status: DISCONTINUED | OUTPATIENT
Start: 2019-03-26 | End: 2019-03-28

## 2019-03-26 RX ORDER — POTASSIUM CHLORIDE 14.9 MG/ML
20 INJECTION INTRAVENOUS ONCE
Status: COMPLETED | OUTPATIENT
Start: 2019-03-26 | End: 2019-03-26

## 2019-03-26 RX ORDER — ACETAMINOPHEN 10 MG/ML
1000 INJECTION, SOLUTION INTRAVENOUS ONCE
Status: COMPLETED | OUTPATIENT
Start: 2019-03-26 | End: 2019-03-26

## 2019-03-26 NOTE — PROGRESS NOTES
[facility]  Report of Psychiatric Progress Note    Johana Francois Patient Status:  Inpatient    1955 MRN [id number]   [facility] 4SW-A Attending Connie Paris MD   [facility] Day # 8 PCP Alverna Serum     Date of Admission: 3/18/19  D for severe anxiety or agitation    6) Continue Haldol 3mg IV every 4hrs prn agitation/psychosis    7) Goal is to use less Ativan prn and less Haldol prn and not restart precedex so his delirium can improve.  Depakote and Seroquel are being used to help with about his friend who gives people food, then changes subject about his 4 children, then tells me he has 3 young children and 3 adult children. He denies using cocaine recently. It was \"way long ago\". He says yes to feeling tired and depressed.  No suicida without mention of complication, not stated as uncontrolled      Past Surgical History:   Procedure Laterality Date   • COLON SURGERY  Aug. 2012   • HERNIA SURGERY  6/16/13    Repair Recurrent Vent./Inc Hernia by Dr. Joshua Dejesus   • OTHER SURGICAL HISTORY  2/2 minerals (ADULT) tab 1 tablet, 1 tablet, Oral, Daily  •  Insulin Aspart Pen (NOVOLOG) 100 UNIT/ML flexpen 1-68 Units, 1-68 Units, Subcutaneous, TID CC and HS  •  influenza vaccine split quad (FLULAVAL) ages 6 months to 65 years inj 0.5ml, 0.5 mL, Intramusc Congruent  Thought process: linear to basic questions but somewhat disorganized  Thought content: no suicidal ideation    Orientation: self and hospital  Attention and Concentration: poor, unable to spell BOAT backwards or give me months of year backwards
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
## 2019-03-26 NOTE — PROGRESS NOTES
[facility]  Progress Note    Argelia Angulo Patient Status:  Inpatient    1955 MRN [id number]   [facility] 4SW-A Attending Rosita Camacho MD   Hosp Day # 8 PCP Ann Mike     STATUS UPDATE: Over the course of the night, the pat a draining NG tube, no masses, no guarding, no   rebound, very hypoactive BS   Extremity: No edema, no cyanosis   Neurological: Alert, interactive, no focal deficits    Lab Data Review:  Recent Labs   Lab  03/20/19   0433   03/23/19   0554  03/24/19   0443 diabetes mellitus  5.  Fever now accompanied by ileus, emesis, possible aspiration.     Plan:  Psychiatry involving his family and reorientation  Continue empiric Zosyn  Appreciate /case management input  Prophylaxis: LMWH  Continue ICU monit

## 2019-03-27 ENCOUNTER — APPOINTMENT (OUTPATIENT)
Dept: GENERAL RADIOLOGY | Facility: HOSPITAL | Age: 64
DRG: 896 | End: 2019-03-27
Attending: INTERNAL MEDICINE
Payer: MEDICARE

## 2019-03-27 ENCOUNTER — APPOINTMENT (OUTPATIENT)
Dept: ULTRASOUND IMAGING | Facility: HOSPITAL | Age: 64
DRG: 896 | End: 2019-03-27
Attending: INTERNAL MEDICINE
Payer: MEDICARE

## 2019-03-27 ENCOUNTER — APPOINTMENT (OUTPATIENT)
Dept: CV DIAGNOSTICS | Facility: HOSPITAL | Age: 64
DRG: 896 | End: 2019-03-27
Attending: HOSPITALIST
Payer: MEDICARE

## 2019-03-27 PROCEDURE — 76706 US ABDL AORTA SCREEN AAA: CPT | Performed by: INTERNAL MEDICINE

## 2019-03-27 PROCEDURE — 99232 SBSQ HOSP IP/OBS MODERATE 35: CPT | Performed by: OTHER

## 2019-03-27 PROCEDURE — 99232 SBSQ HOSP IP/OBS MODERATE 35: CPT | Performed by: HOSPITALIST

## 2019-03-27 PROCEDURE — 71045 X-RAY EXAM CHEST 1 VIEW: CPT | Performed by: INTERNAL MEDICINE

## 2019-03-27 RX ORDER — POTASSIUM CHLORIDE 14.9 MG/ML
20 INJECTION INTRAVENOUS ONCE
Status: COMPLETED | OUTPATIENT
Start: 2019-03-27 | End: 2019-03-27

## 2019-03-27 RX ORDER — METOPROLOL TARTRATE 5 MG/5ML
5 INJECTION INTRAVENOUS EVERY 6 HOURS
Status: DISCONTINUED | OUTPATIENT
Start: 2019-03-27 | End: 2019-04-03

## 2019-03-27 NOTE — PROGRESS NOTES
[facility]  Report of Psychiatric Progress Note    Daniela Núñez Patient Status:  Inpatient    1955 MRN [id number]   [facility] 4SW-A Attending Rosemary Mcpherson MD   [address] Day # 9 PCP Donnamarie Koyanagi     Date of Admission: 3/18/19  D severe anxiety or agitation    5) Continue Haldol 3mg IV every 4hrs prn agitation/psychosis    6) Visits from his wife Shakira Mary [phone] just as long as she helps him stay calm. If they argue and her presence makes things worse, she should not visit. 3/13/19 and 3/18/19. He is drowsy and tangential and unable to answer my questions.      Interval Hx:  3/20/19- He is very tangential and talks about his friend who gives people food, then changes subject about his 4 children, then tells me he has 3 young c denies voices/visions. Made a comment about \"them giving me meds\" and \"can't trust them\", but he couldn't go into detail.      Past Psychiatric History: None for anxiety or depression per patient.     Substance Use History: Alcohol and cocaine use disor Continuous  •  hydrALAzine HCl (APRESOLINE) injection 20 mg, 20 mg, Intravenous, Q4H PRN  •  acetaminophen (TYLENOL) 650 MG rectal suppository 650 mg, 650 mg, Rectal, Q6H PRN  •  LORazepam (ATIVAN) injection 1 mg, 1 mg, Intravenous, BID PRN  •  haloperidol Glucose-Vitamin C (DEX-4) 4-6 GM-MG chewable tab 4 tablet, 4 tablet, Oral, Q15 Min PRN **OR** dextrose 50 % injection 50 mL, 50 mL, Intravenous, Q15 Min PRN **OR** glucose (DEX4) oral liquid 30 g, 30 g, Oral, Q15 Min PRN **OR** Glucose-Vitamin C (DEX-4) 4-

## 2019-03-27 NOTE — CM/SW NOTE
Complex Care Assessment    Argelia Angulo Patient Status:  Inpatient   Age/Gender 66year old male MRN [id number]   [facility] 4SW-A Attending Amadeo Zuluaga MD   Hosp Day # 9 PCP Ann Mike     Assessment Type: Initial    Criteria:   LO

## 2019-03-27 NOTE — PLAN OF CARE
CARDIOVASCULAR - ADULT    • Maintains optimal cardiac output and hemodynamic stability Not Progressing    • Absence of cardiac arrhythmias or at baseline Not Progressing          GASTROINTESTINAL - ADULT    • Maintains or returns to baseline bowel function

## 2019-03-27 NOTE — PROGRESS NOTES
[facility]  Progress Note     Maura Agent Patient Status:  Inpatient    1955 MRN [id number]   [facility] 4SW-A Attending Kaila Mars MD   Hosp Day # 9 PCP Demetrio Melo     Chief Complaint: Confusion     S: Patient   Missy 3.8  3.7   CL  106  107  109*   CO2  27.0  28.0  31.0   ALKPHO   --    --   71   AST   --    --   17   ALT   --    --   16   BILT   --    --   1.3   TP   --    --   7.2          Imaging: Imaging data reviewed in Epic.     Medications:   • pantoprazole (PRO Lovenox  · CODE status: full  · Jones: no  · Central line: no    Will the patient be referred to TCC on discharge?: no  Estimated date of discharge: tbd  Discharge is dependent on: clinical  At this point Mr. Cardoza is expected to be discharge to: tbd    Mar

## 2019-03-27 NOTE — PROGRESS NOTES
Had to Pause echocardiogram at 1430 due to patient not feeling well. Physician said to come back and finish tomorrow morning.

## 2019-03-27 NOTE — PLAN OF CARE
CARDIOVASCULAR - ADULT    • Maintains optimal cardiac output and hemodynamic stability Not Progressing    • Absence of cardiac arrhythmias or at baseline Not Progressing        GASTROINTESTINAL - ADULT    • Maintains or returns to baseline bowel function N non-productive cough. Flutter valve with assist q1h while awake (thus far is always awake). Condom catheter intact.  Reviewed vitals and NG output and lack of psych/BP meds w/ APN and paged psych with inability to give Seroquel.   No family present.  SCD'

## 2019-03-28 ENCOUNTER — APPOINTMENT (OUTPATIENT)
Dept: GENERAL RADIOLOGY | Facility: HOSPITAL | Age: 64
DRG: 896 | End: 2019-03-28
Attending: INTERNAL MEDICINE
Payer: MEDICARE

## 2019-03-28 ENCOUNTER — APPOINTMENT (OUTPATIENT)
Dept: CV DIAGNOSTICS | Facility: HOSPITAL | Age: 64
DRG: 896 | End: 2019-03-28
Attending: HOSPITALIST
Payer: MEDICARE

## 2019-03-28 PROCEDURE — 71045 X-RAY EXAM CHEST 1 VIEW: CPT | Performed by: INTERNAL MEDICINE

## 2019-03-28 PROCEDURE — 93306 TTE W/DOPPLER COMPLETE: CPT | Performed by: HOSPITALIST

## 2019-03-28 PROCEDURE — 99232 SBSQ HOSP IP/OBS MODERATE 35: CPT | Performed by: OTHER

## 2019-03-28 PROCEDURE — 99232 SBSQ HOSP IP/OBS MODERATE 35: CPT | Performed by: HOSPITALIST

## 2019-03-28 RX ORDER — LIDOCAINE 50 MG/G
1 PATCH TOPICAL EVERY 24 HOURS
Status: DISCONTINUED | OUTPATIENT
Start: 2019-03-28 | End: 2019-04-03

## 2019-03-28 RX ORDER — METOPROLOL TARTRATE 5 MG/5ML
5 INJECTION INTRAVENOUS ONCE
Status: COMPLETED | OUTPATIENT
Start: 2019-03-28 | End: 2019-03-28

## 2019-03-28 RX ORDER — SODIUM CHLORIDE 450 MG/100ML
INJECTION, SOLUTION INTRAVENOUS CONTINUOUS
Status: DISCONTINUED | OUTPATIENT
Start: 2019-03-28 | End: 2019-03-30

## 2019-03-28 RX ORDER — METOPROLOL TARTRATE 5 MG/5ML
INJECTION INTRAVENOUS
Status: COMPLETED
Start: 2019-03-28 | End: 2019-03-28

## 2019-03-28 NOTE — PROGRESS NOTES
[facility]  Progress Note     Gail Batista Patient Status:  Inpatient    1955 MRN [id number]   [facility] 4SW-A Attending Igor Church MD   Hosp Day # 10 PCP Jazmin Richards     Chief Complaint: Confusion     S: Patient   Had 32.0   ALKPHO   --   71   --    AST   --   17   --    ALT   --   16   --    BILT   --   1.3   --    TP   --   7.2   --           Imaging: Imaging data reviewed in Epic.     Medications:   • pantoprazole (PROTONIX) IV push  40 mg Intravenous Q24H   • metopro until can tolerate cozaar again  Keep NPO     Quality:  · DVT Prophylaxis: Lovenox  · CODE status: full  · Jones: no  · Central line: no    Will the patient be referred to TCC on discharge?: no  Estimated date of discharge: tbd  Discharge is dependent on:
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
## 2019-03-28 NOTE — PROGRESS NOTES
[facility]  Progress Note    Tra Sheldon Patient Status:  Inpatient    1955 MRN [id number]   [facility] 4SW-A Attending Irish Adam MD   Hosp Day # 10 PCP Salma Sotelo     Subjective:  Tra Sheldon is a(n) 66year old male. 279.0  342.0  301.0     Recent Labs   Lab  03/26/19   0439  03/27/19   0434  03/28/19   0441   GLU  107*  147*  148*   BUN  10  11  13   CREATSERUM  0.82  0.80  0.67*   GFRAA  110  111  120   GFRNAA  95  96  104   CA  9.2  8.8  8.7   NA  143  146*  149*

## 2019-03-28 NOTE — PLAN OF CARE
Received pt change of shift. Wife at bedside. Pt appears agitated attempting to get of out bed and requesting cell phone from wife. Wife and pt arguing about calling a cab to leave. Wife asked to step out into waiting room.   1 mg ativan given with improvem

## 2019-03-28 NOTE — CONSULTS
[facility] dosing service    Initial Pharmacokinetic Consult for Vancomycin Dosing     Ashley Pierce is a 66year old male admitted on 3/18/19 who is being treated for pneumonia.   Pharmacy has been asked to dose Vancomycin by Andrew NAIKN    He has toxicity and efficacy. Pharmacy will continue to follow him. We appreciate the opportunity to assist in his care.     Galen Ly PharmD  3/28/2019  9:24 AM  [address] [place] [address] Extension: [phone]

## 2019-03-29 ENCOUNTER — APPOINTMENT (OUTPATIENT)
Dept: GENERAL RADIOLOGY | Facility: HOSPITAL | Age: 64
DRG: 896 | End: 2019-03-29
Attending: NURSE PRACTITIONER
Payer: MEDICARE

## 2019-03-29 ENCOUNTER — APPOINTMENT (OUTPATIENT)
Dept: GENERAL RADIOLOGY | Facility: HOSPITAL | Age: 64
DRG: 896 | End: 2019-03-29
Attending: INTERNAL MEDICINE
Payer: MEDICARE

## 2019-03-29 PROCEDURE — 71045 X-RAY EXAM CHEST 1 VIEW: CPT | Performed by: INTERNAL MEDICINE

## 2019-03-29 PROCEDURE — 99232 SBSQ HOSP IP/OBS MODERATE 35: CPT | Performed by: OTHER

## 2019-03-29 PROCEDURE — 74018 RADEX ABDOMEN 1 VIEW: CPT | Performed by: NURSE PRACTITIONER

## 2019-03-29 PROCEDURE — 05H533Z INSERTION OF INFUSION DEVICE INTO RIGHT SUBCLAVIAN VEIN, PERCUTANEOUS APPROACH: ICD-10-PCS | Performed by: HOSPITALIST

## 2019-03-29 PROCEDURE — 99232 SBSQ HOSP IP/OBS MODERATE 35: CPT | Performed by: HOSPITALIST

## 2019-03-29 RX ORDER — SODIUM CHLORIDE 0.9 % (FLUSH) 0.9 %
10 SYRINGE (ML) INJECTION EVERY 12 HOURS
Status: DISCONTINUED | OUTPATIENT
Start: 2019-03-29 | End: 2019-04-03

## 2019-03-29 RX ORDER — QUETIAPINE 25 MG/1
50 TABLET, FILM COATED ORAL NIGHTLY
Status: DISCONTINUED | OUTPATIENT
Start: 2019-03-29 | End: 2019-04-01

## 2019-03-29 NOTE — PLAN OF CARE
Pt is pleasant and cooperative. Easily redirectable. Significantly decreased agitation and restlessness. He is more alert Orientated to self, place. disorientated to time but showing improvement to orientation questions. Some disorganized thoughts.  Sitter

## 2019-03-29 NOTE — PROGRESS NOTES
[facility] Pharmacy Consult Note:  Medication List Evaluation for Fever    Johana Francois is a 66year old male who has fever of unknown origin.   Pharmacy has been consulted to evaluate his current medications for those that could be contributing to the presence o
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
## 2019-03-29 NOTE — DIETARY NOTE
NUTRITION FOLLOW-UP ASSESSMENT    Pt is at moderate nutrition risk. Pt does not meet malnutrition criteria.     NUTRITION DIAGNOSIS/PROBLEM:    Inadequate oral intake related to inability to consume sufficient energy as evidenced by agitation, need for TF r 26.41 kg/m². IBW: 83.6 kg  Usual Body Wt: 94-96kg    WEIGHT HISTORY:   Wt Readings from Last 6 Encounters:  03/29/19 : 90.8 kg (200 lb 2.8 oz)  03/18/19 : 94.4 kg (208 lb 1.8 oz)  02/08/19 : 97.5 kg (215 lb)  09/24/18 : 102.1 kg (225 lb)  08/29/18 : 102. 1
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
## 2019-03-29 NOTE — PROGRESS NOTES
[facility]  Progress Note    Yoseph Busch Patient Status:  Inpatient    1955 MRN [id number]   [facility] 4SW-A Attending Renay Zamora MD   [facility] Day # 6 TARIQ Diaz     Subjective:  Yoseph Busch is a(n) 66year old male. 29.2*   RDW  12.5  12.3  11.9   NEPRELIM  7.84*  8.42*  8.79*   WBC  10.1  10.6  10.8   PLT  342.0  301.0  277.0     Recent Labs   Lab  03/27/19   0434  03/28/19   0441  03/29/19 0434   GLU  147*  148*  124*   BUN  11  13  13   CREATSERUM  0.80  0.67*  0 mellitus  5. Fever now accompanied by ileus, emesis, possible aspiration. I am not convinced that his fever is related to a MRSA pneumonia. His pro calcitonin level continues to be quite low.   In addition he does not demonstrate a hectic fever curve is o
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
## 2019-03-30 ENCOUNTER — APPOINTMENT (OUTPATIENT)
Dept: GENERAL RADIOLOGY | Facility: HOSPITAL | Age: 64
DRG: 896 | End: 2019-03-30
Attending: INTERNAL MEDICINE
Payer: MEDICARE

## 2019-03-30 PROCEDURE — 71045 X-RAY EXAM CHEST 1 VIEW: CPT | Performed by: INTERNAL MEDICINE

## 2019-03-30 PROCEDURE — 99232 SBSQ HOSP IP/OBS MODERATE 35: CPT | Performed by: HOSPITALIST

## 2019-03-30 RX ORDER — POTASSIUM CHLORIDE 20 MEQ/1
40 TABLET, EXTENDED RELEASE ORAL EVERY 4 HOURS
Status: COMPLETED | OUTPATIENT
Start: 2019-03-30 | End: 2019-03-30

## 2019-03-30 RX ORDER — MAGNESIUM OXIDE 400 MG (241.3 MG MAGNESIUM) TABLET
400 TABLET ONCE
Status: COMPLETED | OUTPATIENT
Start: 2019-03-30 | End: 2019-03-30

## 2019-03-30 RX ORDER — POTASSIUM CHLORIDE 20 MEQ/1
40 TABLET, EXTENDED RELEASE ORAL EVERY 4 HOURS
Status: DISPENSED | OUTPATIENT
Start: 2019-03-30 | End: 2019-03-31

## 2019-03-30 NOTE — PLAN OF CARE
DRUG ABUSE/DETOX    • Will have no detox symptoms and will verbalize plan for changing drug-related behavior Progressing          Impaired Functional Mobility    • Achieve highest/safest level of mobility/gait Progressing          RESPIRATORY - ADULT    •

## 2019-03-30 NOTE — PROGRESS NOTES
[facility] SPECIALTY \[facility]\""  Progress Note    Thien Watkins Patient Status:  Inpatient    1955 MRN [id number]   [facility] 4SW-A Attending Marlena Hyman MD   Hosp Day # 12 PCP Loc Luna     Subjective:  Thien Watkins is a(n) 66year old male o atelectasis has improved significantly overall essentially clear no      Medications reviewed     Assessment and Plan:   Patient Active Problem List:     Shoulder pain     Recurrent ventral incisional hernia with obstruction     AVN (avascular necrosis of imaging  In the interim psychiatry input appreciated with plans for neuropsych testing.         Shaheed Esparza MD  3/30/2019  8:56 AM

## 2019-03-30 NOTE — PROGRESS NOTES
[facility]  Progress Note     Tom Zuniga Patient Status:  Inpatient    1955 MRN [id number]   [facility] 4SW-A Attending Ni Mena MD   Hosp Day # 12 PCP Luis Huang     Chief Complaint: confusion abd pain    S: Patient CO2  31.0  32.0  33.0*  33.0*   ALKPHO  71   --    --   55   AST  17   --    --   14*   ALT  16   --    --   15*   BILT  1.3   --    --   0.9   TP  7.2   --    --   6.0*       Estimated Creatinine Clearance: 116.9 mL/min (based on SCr of 0.75 mg/dL). hydralazine, IV - BB   8.  COPD stable      Plan of care:      Tm 101  VANCO ADDED   Cont pt therapy  His mental status is improving - but does not seem to understand  How his actions affect his over all medical care / life      Quality:  · DVT Prophylaxis:

## 2019-03-30 NOTE — PROGRESS NOTES
120 [facility] dosing service    Follow-up Pharmacokinetic Consult for Vancomycin Dosing     Irma Chacon is a 66year old male who is being treated for pneumonia. Patient is on day 2 of Vancomycin 1.5 gm IV Q 12 hours. Goal trough is 15-20 ug/mL.     He level 15-20 ug/mL. 3.  Pharmacy will need BUN/Scr daily while on Vancomycin to assess renal function. 4.  Pharmacy will follow and monitor renal function changes, toxicity and efficacy.     Estelita Carrizales, PharmD  3/29/2019  11:21 PM  [facility]

## 2019-03-31 ENCOUNTER — APPOINTMENT (OUTPATIENT)
Dept: GENERAL RADIOLOGY | Facility: HOSPITAL | Age: 64
DRG: 896 | End: 2019-03-31
Attending: INTERNAL MEDICINE
Payer: MEDICARE

## 2019-03-31 PROCEDURE — 71045 X-RAY EXAM CHEST 1 VIEW: CPT | Performed by: INTERNAL MEDICINE

## 2019-03-31 PROCEDURE — 99232 SBSQ HOSP IP/OBS MODERATE 35: CPT | Performed by: HOSPITALIST

## 2019-03-31 RX ORDER — ACETAMINOPHEN 325 MG/1
650 TABLET ORAL EVERY 6 HOURS PRN
Status: DISCONTINUED | OUTPATIENT
Start: 2019-03-31 | End: 2019-04-03

## 2019-03-31 RX ORDER — LABETALOL HYDROCHLORIDE 5 MG/ML
10 INJECTION, SOLUTION INTRAVENOUS EVERY 4 HOURS PRN
Status: DISCONTINUED | OUTPATIENT
Start: 2019-03-31 | End: 2019-04-03

## 2019-03-31 RX ORDER — POTASSIUM CHLORIDE 20 MEQ/1
40 TABLET, EXTENDED RELEASE ORAL ONCE
Status: COMPLETED | OUTPATIENT
Start: 2019-03-31 | End: 2019-03-31

## 2019-03-31 RX ORDER — MAGNESIUM OXIDE 400 MG (241.3 MG MAGNESIUM) TABLET
400 TABLET ONCE
Status: COMPLETED | OUTPATIENT
Start: 2019-03-31 | End: 2019-03-31

## 2019-03-31 RX ORDER — LIDOCAINE 50 MG/G
1 PATCH TOPICAL EVERY 24 HOURS
Status: DISCONTINUED | OUTPATIENT
Start: 2019-03-31 | End: 2019-04-03

## 2019-03-31 RX ORDER — ACETAMINOPHEN 325 MG/1
325 TABLET ORAL EVERY 6 HOURS PRN
Status: DISCONTINUED | OUTPATIENT
Start: 2019-03-31 | End: 2019-04-03

## 2019-03-31 RX ORDER — IPRATROPIUM BROMIDE AND ALBUTEROL SULFATE 2.5; .5 MG/3ML; MG/3ML
3 SOLUTION RESPIRATORY (INHALATION) EVERY 4 HOURS PRN
Status: DISCONTINUED | OUTPATIENT
Start: 2019-03-31 | End: 2019-04-03

## 2019-03-31 NOTE — PLAN OF CARE
Patient impulsive throughout the night, but redirectable. Patient did not sleep overnight. Patient continues to express need to leave hospital, educated patient on importance of staying, pt would like to discuss with doctors during rounds.  Hydralazine give

## 2019-03-31 NOTE — PROGRESS NOTES
120 [facility] dosing service    Follow-up Pharmacokinetic Consult for Vancomycin Dosing     Lisseth Stuart is a 66year old male  who is being treated for MRSA pneumonia. Patient is on day 4 of Vancomycin with a current dose of 1.5 gm IV Q 8 hours.   Goal t [place], PharmD  3/31/2019  8:01 AM  [address] [place] [address] Extension: [phone]
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
## 2019-03-31 NOTE — PROGRESS NOTES
[facility]  Progress Note    Samantha Cain Patient Status:  Inpatient    1955 MRN [id number]   [facility] 4SW-A Attending Peter Ballard MD   [facility] Day # 15 PCP Martin Horne     Subjective:  Samantha Cain is a(n) 66year old male r Problem List:     Shoulder pain     Recurrent ventral incisional hernia with obstruction     AVN (avascular necrosis of bone) ([facility])     Unspecified internal derangement of knee     Shoulder arthritis     Aseptic necrosis of bone, site unspecified     S/P s too Mahoney MD  3/31/2019  12:47 PM

## 2019-04-01 PROCEDURE — 99232 SBSQ HOSP IP/OBS MODERATE 35: CPT | Performed by: HOSPITALIST

## 2019-04-01 PROCEDURE — 99232 SBSQ HOSP IP/OBS MODERATE 35: CPT | Performed by: OTHER

## 2019-04-01 RX ORDER — DIVALPROEX SODIUM 500 MG/1
1500 TABLET, EXTENDED RELEASE ORAL NIGHTLY
Status: DISCONTINUED | OUTPATIENT
Start: 2019-04-01 | End: 2019-04-01

## 2019-04-01 RX ORDER — POTASSIUM CHLORIDE 20 MEQ/1
40 TABLET, EXTENDED RELEASE ORAL EVERY 4 HOURS
Status: COMPLETED | OUTPATIENT
Start: 2019-04-01 | End: 2019-04-01

## 2019-04-01 RX ORDER — HALOPERIDOL 5 MG/ML
5 INJECTION INTRAMUSCULAR EVERY 4 HOURS PRN
Status: DISCONTINUED | OUTPATIENT
Start: 2019-04-01 | End: 2019-04-03

## 2019-04-01 RX ORDER — RISPERIDONE 1 MG/1
1 TABLET, FILM COATED ORAL NIGHTLY
Status: DISCONTINUED | OUTPATIENT
Start: 2019-04-01 | End: 2019-04-03

## 2019-04-01 RX ORDER — QUETIAPINE 100 MG/1
100 TABLET, FILM COATED ORAL NIGHTLY
Status: DISCONTINUED | OUTPATIENT
Start: 2019-04-01 | End: 2019-04-01

## 2019-04-01 RX ORDER — MAGNESIUM OXIDE 400 MG (241.3 MG MAGNESIUM) TABLET
400 TABLET ONCE
Status: COMPLETED | OUTPATIENT
Start: 2019-04-01 | End: 2019-04-01

## 2019-04-01 RX ORDER — LORAZEPAM 2 MG/ML
2 INJECTION INTRAMUSCULAR EVERY 4 HOURS PRN
Status: DISCONTINUED | OUTPATIENT
Start: 2019-04-01 | End: 2019-04-03

## 2019-04-01 RX ORDER — PANTOPRAZOLE SODIUM 40 MG/1
40 TABLET, DELAYED RELEASE ORAL
Status: DISCONTINUED | OUTPATIENT
Start: 2019-04-02 | End: 2019-04-03

## 2019-04-01 NOTE — PLAN OF CARE
DRUG ABUSE/DETOX    • Will have no detox symptoms and will verbalize plan for changing drug-related behavior Not Progressing          Risk for Violence-Violent Restraints/Seclusion    • Patient will not express any violent or self-destructive behaviors Pro

## 2019-04-01 NOTE — PLAN OF CARE
CARDIOVASCULAR - ADULT    • Maintains optimal cardiac output and hemodynamic stability Progressing    • Absence of cardiac arrhythmias or at baseline Progressing          DRUG ABUSE/DETOX    • Will have no detox symptoms and will verbalize plan for elmer

## 2019-04-01 NOTE — PROGRESS NOTES
[facility] Pharmacy Note: Route Optimization for Pantoprazole (PROTONIX)    Patient is currently on Pantoprazole (PROTONIX) 40 mg IV every 24 hours.    The patient meets the criteria to convert to the oral equivalent as established by the IV to Oral conversion pro
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
## 2019-04-01 NOTE — CM/SW NOTE
Advised in ICU rounds this am that ileus has resolved. Taking PO.  PT/OT = 24 hr care / supervision  Pending neuropsych evaluation for decisional capacity. Plan: / to remain available for support and/or discharge planning.    The

## 2019-04-01 NOTE — PROGRESS NOTES
[facility]  Progress Note     Lisseth Range Patient Status:  Inpatient    1955 MRN [id number]   [facility] 4SW-A Attending Joana De La Torre MD   Hosp Day # 15 PCP Davon Maravilla     Chief Complaint: confusion    S: Patient   Wants 04/01/19   0413   GLU  147*   < >  107*   --   153*  132*   BUN  11   < >  9   --   6*  6*   CREATSERUM  0.80   < >  0.75  0.75   --   0.73  0.73  0.72  0.72   GFRAA  111   < >  114  114   --   116  116  116  116   GFRNAA  96   < >  99  99   --   100  100 Units Subcutaneous TID CC and HS   • atorvastatin  20 mg Oral Nightly   • ketorolac tromethamine  2 drop Both Eyes TID   • Montelukast Sodium  10 mg Oral Nightly   • Alfuzosin HCl ER  10 mg Oral Daily with breakfast   • losartan  100 mg Oral Daily   • enox

## 2019-04-01 NOTE — PROGRESS NOTES
[facility]  Progress Note    Herman Cronin Patient Status:  Inpatient    1955 MRN [id number]   [facility] 4SW-A Attending Tien Guzman MD   [facility] Day # 15 PCP Lynette Bryant     Subjective:  Herman Cronin is a(n) 66year old male. 92.1  93.6   MCH  28.6  28.8  29.3  29.0   MCHC  29.2*  30.9*  31.8  30.9*   RDW  11.9  11.7  11.9  11.9   NEPRELIM  8.79*  6.63  6.25   --    WBC  10.8  9.7  8.4  6.6   PLT  277.0  232.0  236.0  264.0     Recent Labs   Lab  03/30/19   0503  03/30/19   180 the interim remains on treatment for possible aspiration/MRSA pneumonia-chest x-ray now without evidence of infiltrate with plans [facility]  4/1/2019  8:26 AM

## 2019-04-01 NOTE — PLAN OF CARE
PSYCH ADDENDUM: 2:    RECS: DC Seroquel. Start Risperdal 1mg nightly. Using a high potency antipsychotic instead to help with his paranoid delusions.      PSYCH ADDENDUM:  1) Neuro-psych testing showed significant inattention and NO decisional capacity at t

## 2019-04-01 NOTE — CONSULTS
[facility]    PATIENT'S NAME: KIARA, [de-identified]   ATTENDING PHYSICIAN: Katie Stevens M.D.   Thermon Oven: Sumit Herring Psy.D.   PATIENT ACCOUNT#:   [de-identified]    LOCATION:  [address] [place] [address]  MEDICAL RECORD #:   [id number]       DATE OF BIRTH: more than 2 standard deviations below the mean. On the Neurobehavioral Cognitive Status examination, he achieved a score of 3/12, and the threshold for normal limits is 10/12. Frontal lobe operations of working memory are at the 1st percentile.   This is functional delayed recall adding to some of his functionality in the here and now but overall there is impairment.   Typically, when there is this level of difficulty with encoding as well as delayed recall, this may be an acute process part of his detoxifi
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
## 2019-04-01 NOTE — DIETARY NOTE
NUTRITION FOLLOW-UP ASSESSMENT    Pt is at low nutrition risk. Pt does not meet malnutrition criteria.     NUTRITION DIAGNOSIS/PROBLEM:    Inadequate oral intake related to inability to consume sufficient energy as evidenced by agitation, need for TF recomm Encounters:  03/29/19 : 94.7 kg (208 lb 12.4 oz)  03/18/19 : 94.4 kg (208 lb 1.8 oz)  02/08/19 : 97.5 kg (215 lb)  09/24/18 : 102.1 kg (225 lb)  08/29/18 : 102.1 kg (225 lb)  08/13/18 : 102.1 kg (225 lb)      NUTRITION:  Diet: NPO  Oral Supplements: none

## 2019-04-02 PROCEDURE — 99232 SBSQ HOSP IP/OBS MODERATE 35: CPT | Performed by: HOSPITALIST

## 2019-04-02 PROCEDURE — 99232 SBSQ HOSP IP/OBS MODERATE 35: CPT | Performed by: OTHER

## 2019-04-02 RX ORDER — MELATONIN
100
Qty: 60 TABLET | Refills: 0 | Status: SHIPPED | OUTPATIENT
Start: 2019-04-02 | End: 2021-07-19 | Stop reason: CLARIF

## 2019-04-02 RX ORDER — DIVALPROEX SODIUM 500 MG/1
1500 TABLET, EXTENDED RELEASE ORAL NIGHTLY
Qty: 90 TABLET | Refills: 0 | Status: ON HOLD | OUTPATIENT
Start: 2019-04-02 | End: 2019-04-06

## 2019-04-02 RX ORDER — RISPERIDONE 1 MG/1
1 TABLET, FILM COATED ORAL NIGHTLY
Qty: 30 TABLET | Refills: 0 | Status: ON HOLD | OUTPATIENT
Start: 2019-04-02 | End: 2019-04-03

## 2019-04-02 RX ORDER — FOLIC ACID 1 MG/1
1 TABLET ORAL 2 TIMES DAILY
Qty: 30 TABLET | Refills: 0 | Status: SHIPPED | OUTPATIENT
Start: 2019-04-02 | End: 2021-07-19 | Stop reason: CLARIF

## 2019-04-02 RX ORDER — MULTIPLE VITAMINS W/ MINERALS TAB 9MG-400MCG
1 TAB ORAL DAILY
Qty: 30 TABLET | Refills: 0 | Status: SHIPPED | OUTPATIENT
Start: 2019-04-03

## 2019-04-02 RX ORDER — DIVALPROEX SODIUM 500 MG/1
1500 TABLET, EXTENDED RELEASE ORAL NIGHTLY
Status: DISCONTINUED | OUTPATIENT
Start: 2019-04-02 | End: 2019-04-03

## 2019-04-02 RX ORDER — PANTOPRAZOLE SODIUM 40 MG/1
40 TABLET, DELAYED RELEASE ORAL
Qty: 30 TABLET | Refills: 0 | Status: SHIPPED | OUTPATIENT
Start: 2019-04-03 | End: 2021-07-19 | Stop reason: CLARIF

## 2019-04-02 RX ORDER — CLINDAMYCIN HYDROCHLORIDE 150 MG/1
600 CAPSULE ORAL EVERY 8 HOURS SCHEDULED
Status: DISCONTINUED | OUTPATIENT
Start: 2019-04-02 | End: 2019-04-03
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
## 2019-04-02 NOTE — PLAN OF CARE
CARDIOVASCULAR - ADULT    • Maintains optimal cardiac output and hemodynamic stability Progressing    • Absence of cardiac arrhythmias or at baseline Progressing          Delirium    • Minimize duration of delirium Progressing          DRUG ABUSE/DETOX

## 2019-04-02 NOTE — PLAN OF CARE
1600:  Patient was notified by Neuro Psych that he was deemed non decisional and that he would not be able to leave. Patient became very agitated and said he was leaving; though was not aggressive at this time.  He stated that I \"should call the security

## 2019-04-02 NOTE — PLAN OF CARE
Assumed care of pt @ 1300. Pt oriented x4 but very impulsive and has short attention span. Pt is restless, calls quite frequently to get in and out of bed but is using call light appropriately. Steady gait when up. Bed and chair alarm on at all times.  Pt v

## 2019-04-02 NOTE — PHYSICAL THERAPY NOTE
Attempted to see pt. During chart review noting pt requires 4 point leather restraints. Pt is not appropriate for inpt PT at this time. Will continue to follow 2-3x/wk as appropriate.

## 2019-04-02 NOTE — PROGRESS NOTES
[facility]  Progress Note    Jose Nuñez Patient Status:  Inpatient    1955 MRN [id number]   [facility] 4SW-A Attending Marcia Blas MD   [address] Day # 13 PCP Veronika Barahona     Subjective:  Jose Nuñez is a(n) 66year old male. 8.3*   HCT  34.6*  28.5*  28.0*  27.8*  26.6*   MCV  98.0  93.1  92.1  93.6  92.7   MCH  28.6  28.8  29.3  29.0  28.9   MCHC  29.2*  30.9*  31.8  30.9*  31.2   RDW  11.9  11.7  11.9  11.9  12.5   NEPRELIM  8.79*  6.63  6.25   --    --    WBC  10.8  9.7  8. neuropsych testing in a.m. with ongoing discharge planning  In the interim remains on treatment for possible aspiration/MRSA pneumonia-chest x-ray now without evidence of infiltrate with plans to complete course in a..     Isaak Jackson SR.  4/2/2019
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
## 2019-04-02 NOTE — PROGRESS NOTES
Patient remains in bed in 4pt leather restraints. He has not been fighting with staff or attempting to get out of bed. He is currently sleeping in bed.   Orders for violent restraints continued for now but case also discussed with RN and plan is to de-esc

## 2019-04-03 VITALS
TEMPERATURE: 99 F | OXYGEN SATURATION: 96 % | DIASTOLIC BLOOD PRESSURE: 71 MMHG | BODY MASS INDEX: 27.67 KG/M2 | RESPIRATION RATE: 18 BRPM | WEIGHT: 208.75 LBS | SYSTOLIC BLOOD PRESSURE: 160 MMHG | HEART RATE: 98 BPM | HEIGHT: 73 IN

## 2019-04-03 PROCEDURE — 99238 HOSP IP/OBS DSCHRG MGMT 30/<: CPT | Performed by: HOSPITALIST

## 2019-04-03 PROCEDURE — 99232 SBSQ HOSP IP/OBS MODERATE 35: CPT | Performed by: OTHER

## 2019-04-03 RX ORDER — CLINDAMYCIN HYDROCHLORIDE 300 MG/1
600 CAPSULE ORAL EVERY 8 HOURS SCHEDULED
Qty: 48 CAPSULE | Refills: 0 | Status: ON HOLD | OUTPATIENT
Start: 2019-04-03 | End: 2019-04-06

## 2019-04-03 RX ORDER — RISPERIDONE 0.5 MG/1
1.5 TABLET, FILM COATED ORAL NIGHTLY
Qty: 45 TABLET | Refills: 0 | Status: ON HOLD | OUTPATIENT
Start: 2019-04-03 | End: 2019-04-06

## 2019-04-03 NOTE — PROGRESS NOTES
[facility]  Progress Note    Yoseph Busch Patient Status:  Inpatient    1955 MRN [id number]   [facility] 4SW-A Attending Ge Almanzar MD   [facility] Day # 12 PCP Jose Francisco Diaz     Subjective:  Yoseph Busch is a(n) 66year old mal 2.97*  2.87*  3.04*   HGB  10.1*  8.8*  8.9*  8.6*  8.3*  8.8*   HCT  34.6*  28.5*  28.0*  27.8*  26.6*  28.3*   MCV  98.0  93.1  92.1  93.6  92.7  93.1   MCH  28.6  28.8  29.3  29.0  28.9  28.9   MCHC  29.2*  30.9*  31.8  30.9*  31.2  31.1   RDW  11.9  11 episodic bronchospasm  4. Essential hypertension  5. Type 2 diabetes mellitus  6. Finally afebrile with a plan to complete oral therapy for MRSA lower respiratory tract infection bolstered by C. difficile prophylaxis.     Continue to increase activity  Plan

## 2019-04-03 NOTE — PLAN OF CARE
1930: Rec'd report from previous RN, care assumed, pt assessed per flow sheets. Pt oriented x4 though very delusional.  Pt continues to make statements that the staff has been poisoning him and/or lying to him.   Pt remains on room air with sats greater th
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
## 2019-04-03 NOTE — BH PROGRESS NOTE
Talked with Dr. Blanka Orozco earlier and he said, the pt is medically cleared and he wants him transferred to [facility]. Called and spoke with the lead of ARC at [facility], [facility]. He said he doesn't have a bed available.   Went up to the unit and filled out th

## 2019-04-03 NOTE — CM/SW NOTE
[facility] [phone] on will call for transport to [facility] today. ICU RN aware. Sw tubes PCS forms to ICU. Neeru Ledezma, [facility] /Dischage Planner  [phone]  Pager [phone]

## 2019-04-03 NOTE — PLAN OF CARE
Delirium    • Minimize duration of delirium Progressing        NEUROLOGICAL - ADULT    • Achieves stable or improved neurological status Progressing          Assumed care of patient at 0730.   Alert and oriented; anxious and slightly agitated at times; redi

## 2019-04-03 NOTE — BH PROGRESS NOTE
Talked with Dr. Jose Cruz Francisco earlier and the plan is still for the pt to go to [facility] but now he is asking for a Gen/Cricket bed. Called and spoke with Stevie Hernandez at [facility] and she gave the bed 318A. Report called to the Harmony Gee.   The P+C given to the pt

## 2019-04-03 NOTE — PROGRESS NOTES
D/c to [facility] if bed becomes available. patinet with no complaints. Exam benign except for somewhat pressure speech. Vitals stable.

## 2019-04-04 NOTE — DISCHARGE SUMMARY
[facility]PITAL[facility]  DISCHARGE SUMMARY     Jeff Ramirez Patient Status:  Inpatient    1955 MRN [id number]   [facility] 4SW-A Attending No att. providers found   Hosp Day # 16 PCP Irma Carcamo     Date of Admission: 3/18/2019  Date of at Discharge:   · none    Consultants:  neuropsych-dr.[place] Dent-psych   [facility]    Discharge Medication List:     Discharge Medications      START taking these medications      Instructions Prescriptio atorvastatin 20 MG Tabs  Commonly known as:  LIPITOR      Take 20 mg by mouth nightly. Refills:  0     Cholecalciferol 5000 units Tabs  Commonly known as:  VITAMIN D-3      Take 5,000 Units by mouth daily.    Refills:  0     COMBIVENT RESPIMAT  MC 40 MG Tbec  · risperiDONE 0.5 MG Tabs  · Thiamine HCl 100 MG Tabs  · Umeclidinium Bromide 62.5 MCG/INH Aepb         [place]    Follow-up appointment:   No follow-up provider specified.   Appointments for Next 30 Days 4/4/2019 - 5/4/2019    None

## 2019-05-22 ENCOUNTER — HOSPITAL (OUTPATIENT)
Dept: OTHER | Age: 64
End: 2019-05-22

## 2019-05-22 PROBLEM — M54.12 CERVICAL RADICULITIS: Status: ACTIVE | Noted: 2019-05-22

## 2019-05-22 PROBLEM — M25.511 CHRONIC RIGHT SHOULDER PAIN: Status: ACTIVE | Noted: 2019-05-22

## 2019-05-22 PROBLEM — Z47.1 AFTERCARE FOLLOWING RIGHT SHOULDER JOINT REPLACEMENT SURGERY: Status: ACTIVE | Noted: 2019-05-22

## 2019-05-22 PROBLEM — G89.29 CHRONIC RIGHT SHOULDER PAIN: Status: ACTIVE | Noted: 2019-05-22

## 2019-05-22 PROBLEM — Z96.611 AFTERCARE FOLLOWING RIGHT SHOULDER JOINT REPLACEMENT SURGERY: Status: ACTIVE | Noted: 2019-05-22

## 2019-05-22 PROCEDURE — 86141 C-REACTIVE PROTEIN HS: CPT | Performed by: ORTHOPAEDIC SURGERY

## 2019-05-23 ENCOUNTER — HOSPITAL (OUTPATIENT)
Dept: OTHER | Age: 64
End: 2019-05-23

## 2019-05-23 PROCEDURE — 99219 INITIAL OBSERVATION CARE,LEVL II: CPT | Performed by: NURSE PRACTITIONER

## 2019-05-29 ENCOUNTER — HOSPITAL (OUTPATIENT)
Dept: OTHER | Age: 64
End: 2019-05-29

## 2019-05-29 PROBLEM — M87.022 AVASCULAR NECROSIS OF HUMERAL HEAD, LEFT (HCC): Status: ACTIVE | Noted: 2019-05-29

## 2019-05-30 ENCOUNTER — HOSPITAL (OUTPATIENT)
Dept: OTHER | Age: 64
End: 2019-05-30

## 2019-09-14 ENCOUNTER — APPOINTMENT (OUTPATIENT)
Dept: CT IMAGING | Facility: HOSPITAL | Age: 64
End: 2019-09-14
Attending: EMERGENCY MEDICINE
Payer: MEDICARE

## 2019-09-14 ENCOUNTER — HOSPITAL ENCOUNTER (EMERGENCY)
Facility: HOSPITAL | Age: 64
Discharge: LEFT BEFORE DISCHARGE | End: 2019-09-15
Attending: EMERGENCY MEDICINE
Payer: MEDICARE

## 2019-09-14 DIAGNOSIS — R25.2 MUSCLE CRAMPS: ICD-10-CM

## 2019-09-14 DIAGNOSIS — J98.01 BRONCHOSPASM: ICD-10-CM

## 2019-09-14 DIAGNOSIS — R10.9 ABDOMINAL PAIN OF UNKNOWN ETIOLOGY: Primary | ICD-10-CM

## 2019-09-14 LAB
ALBUMIN SERPL-MCNC: 4.3 G/DL (ref 3.4–5)
ALBUMIN/GLOB SERPL: 1.2 {RATIO} (ref 1–2)
ALP LIVER SERPL-CCNC: 92 U/L (ref 45–117)
ALT SERPL-CCNC: 27 U/L (ref 16–61)
ANION GAP SERPL CALC-SCNC: 7 MMOL/L (ref 0–18)
AST SERPL-CCNC: 14 U/L (ref 15–37)
BASOPHILS # BLD AUTO: 0.04 X10(3) UL (ref 0–0.2)
BASOPHILS NFR BLD AUTO: 0.4 %
BILIRUB SERPL-MCNC: 0.5 MG/DL (ref 0.1–2)
BUN BLD-MCNC: 21 MG/DL (ref 7–18)
BUN/CREAT SERPL: 18.4 (ref 10–20)
CALCIUM BLD-MCNC: 10 MG/DL (ref 8.5–10.1)
CHLORIDE SERPL-SCNC: 101 MMOL/L (ref 98–112)
CO2 SERPL-SCNC: 30 MMOL/L (ref 21–32)
CREAT BLD-MCNC: 1.14 MG/DL (ref 0.7–1.3)
DEPRECATED RDW RBC AUTO: 36.6 FL (ref 35.1–46.3)
EOSINOPHIL # BLD AUTO: 0.08 X10(3) UL (ref 0–0.7)
EOSINOPHIL NFR BLD AUTO: 0.9 %
ERYTHROCYTE [DISTWIDTH] IN BLOOD BY AUTOMATED COUNT: 11.4 % (ref 11–15)
GLOBULIN PLAS-MCNC: 3.5 G/DL (ref 2.8–4.4)
GLUCOSE BLD-MCNC: 161 MG/DL (ref 70–99)
HAV IGM SER QL: 2.1 MG/DL (ref 1.6–2.6)
HCT VFR BLD AUTO: 43 % (ref 39–53)
HGB BLD-MCNC: 13.9 G/DL (ref 13–17.5)
IMM GRANULOCYTES # BLD AUTO: 0.03 X10(3) UL (ref 0–1)
IMM GRANULOCYTES NFR BLD: 0.3 %
LIPASE SERPL-CCNC: 88 U/L (ref 73–393)
LYMPHOCYTES # BLD AUTO: 2.3 X10(3) UL (ref 1–4)
LYMPHOCYTES NFR BLD AUTO: 25.3 %
M PROTEIN MFR SERPL ELPH: 7.8 G/DL (ref 6.4–8.2)
MCH RBC QN AUTO: 28.8 PG (ref 26–34)
MCHC RBC AUTO-ENTMCNC: 32.3 G/DL (ref 31–37)
MCV RBC AUTO: 89 FL (ref 80–100)
MONOCYTES # BLD AUTO: 0.66 X10(3) UL (ref 0.1–1)
MONOCYTES NFR BLD AUTO: 7.3 %
NEUTROPHILS # BLD AUTO: 5.98 X10 (3) UL (ref 1.5–7.7)
NEUTROPHILS # BLD AUTO: 5.98 X10(3) UL (ref 1.5–7.7)
NEUTROPHILS NFR BLD AUTO: 65.8 %
OSMOLALITY SERPL CALC.SUM OF ELEC: 292 MOSM/KG (ref 275–295)
PLATELET # BLD AUTO: 226 10(3)UL (ref 150–450)
POTASSIUM SERPL-SCNC: 4 MMOL/L (ref 3.5–5.1)
RBC # BLD AUTO: 4.83 X10(6)UL (ref 4.3–5.7)
SODIUM SERPL-SCNC: 138 MMOL/L (ref 136–145)
WBC # BLD AUTO: 9.1 X10(3) UL (ref 4–11)

## 2019-09-14 PROCEDURE — 85025 COMPLETE CBC W/AUTO DIFF WBC: CPT

## 2019-09-14 PROCEDURE — 93005 ELECTROCARDIOGRAM TRACING: CPT

## 2019-09-14 PROCEDURE — 85025 COMPLETE CBC W/AUTO DIFF WBC: CPT | Performed by: EMERGENCY MEDICINE

## 2019-09-14 PROCEDURE — 74178 CT ABD&PLV WO CNTR FLWD CNTR: CPT | Performed by: EMERGENCY MEDICINE

## 2019-09-14 PROCEDURE — 80053 COMPREHEN METABOLIC PANEL: CPT

## 2019-09-14 PROCEDURE — 80053 COMPREHEN METABOLIC PANEL: CPT | Performed by: EMERGENCY MEDICINE

## 2019-09-14 PROCEDURE — 99284 EMERGENCY DEPT VISIT MOD MDM: CPT

## 2019-09-14 PROCEDURE — 93010 ELECTROCARDIOGRAM REPORT: CPT

## 2019-09-14 PROCEDURE — 99285 EMERGENCY DEPT VISIT HI MDM: CPT

## 2019-09-14 PROCEDURE — 83735 ASSAY OF MAGNESIUM: CPT | Performed by: EMERGENCY MEDICINE

## 2019-09-14 PROCEDURE — 96360 HYDRATION IV INFUSION INIT: CPT

## 2019-09-14 PROCEDURE — 83690 ASSAY OF LIPASE: CPT | Performed by: EMERGENCY MEDICINE

## 2019-09-14 PROCEDURE — 96372 THER/PROPH/DIAG INJ SC/IM: CPT

## 2019-09-15 VITALS
BODY MASS INDEX: 31.14 KG/M2 | TEMPERATURE: 98 F | DIASTOLIC BLOOD PRESSURE: 99 MMHG | HEIGHT: 73 IN | SYSTOLIC BLOOD PRESSURE: 126 MMHG | OXYGEN SATURATION: 95 % | RESPIRATION RATE: 20 BRPM | WEIGHT: 235 LBS | HEART RATE: 100 BPM

## 2019-09-15 LAB
BILIRUB UR QL STRIP.AUTO: NEGATIVE
CLARITY UR REFRACT.AUTO: CLEAR
COLOR UR AUTO: YELLOW
GLUCOSE UR STRIP.AUTO-MCNC: >=500 MG/DL
KETONES UR STRIP.AUTO-MCNC: NEGATIVE MG/DL
LEUKOCYTE ESTERASE UR QL STRIP.AUTO: NEGATIVE
NITRITE UR QL STRIP.AUTO: NEGATIVE
PH UR STRIP.AUTO: 5 [PH] (ref 4.5–8)
PROT UR STRIP.AUTO-MCNC: NEGATIVE MG/DL
RBC UR QL AUTO: NEGATIVE
SP GR UR STRIP.AUTO: 1.06 (ref 1–1.03)
UROBILINOGEN UR STRIP.AUTO-MCNC: <2 MG/DL

## 2019-09-15 PROCEDURE — 94640 AIRWAY INHALATION TREATMENT: CPT

## 2019-09-15 PROCEDURE — 81003 URINALYSIS AUTO W/O SCOPE: CPT | Performed by: EMERGENCY MEDICINE

## 2019-09-15 RX ORDER — DICYCLOMINE HYDROCHLORIDE 10 MG/ML
20 INJECTION INTRAMUSCULAR ONCE
Status: COMPLETED | OUTPATIENT
Start: 2019-09-15 | End: 2019-09-15

## 2019-09-15 RX ORDER — IPRATROPIUM BROMIDE AND ALBUTEROL SULFATE 2.5; .5 MG/3ML; MG/3ML
3 SOLUTION RESPIRATORY (INHALATION) ONCE
Status: COMPLETED | OUTPATIENT
Start: 2019-09-15 | End: 2019-09-15

## 2019-09-15 RX ORDER — IPRATROPIUM BROMIDE AND ALBUTEROL SULFATE 2.5; .5 MG/3ML; MG/3ML
SOLUTION RESPIRATORY (INHALATION)
Status: DISCONTINUED
Start: 2019-09-15 | End: 2019-09-15

## 2019-09-15 RX ORDER — PREDNISONE 20 MG/1
60 TABLET ORAL ONCE
Status: COMPLETED | OUTPATIENT
Start: 2019-09-15 | End: 2019-09-15

## 2019-09-15 RX ORDER — ALBUTEROL SULFATE 2.5 MG/3ML
SOLUTION RESPIRATORY (INHALATION)
Status: DISCONTINUED
Start: 2019-09-15 | End: 2019-09-15

## 2019-09-15 RX ORDER — PREDNISONE 20 MG/1
40 TABLET ORAL DAILY
Qty: 10 TABLET | Refills: 0 | Status: SHIPPED | OUTPATIENT
Start: 2019-09-15 | End: 2019-09-20

## 2019-09-15 RX ORDER — DICYCLOMINE HCL 20 MG
20 TABLET ORAL 4 TIMES DAILY PRN
Qty: 30 TABLET | Refills: 0 | Status: SHIPPED | OUTPATIENT
Start: 2019-09-15 | End: 2019-10-15

## 2019-09-15 NOTE — ED NOTES
2220 - Pt was on the call light. Puneet Isaac, ED PCT responded at bedside. Pt states he's having leg cramps and stated \"couldn't wait\" for ED staff to put the rails down because he needed to stand up.  Dr. Carmen Herrera was at bedside for eval. Pt was noted standing
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
## 2019-09-15 NOTE — ED INITIAL ASSESSMENT (HPI)
Pt to ED brought by EMS for LUQ pain onset tonight and left leg cramps since yesterday. Pt denies N/V. Fentanyl given by EMS PTA.

## 2019-09-15 NOTE — ED NOTES
2200 - Pt assisted by Dori Seo RN in changing into Pt gown. Pt states because of the pain, he couldn't even bear to remove his shirt.  As this RN was helping Pt remove his jeans, Pt states he's not able to unbuckle his belt and unbutton his jeans and needed RN

## 2019-09-15 NOTE — ED NOTES
After ER MD exam, Pt states \"I want to go home. \" Dr. Sharita Fields aware and Pt will be discharged AMA.

## 2019-09-15 NOTE — ED NOTES
Dr. Sydnee Lagos at bedside speaking with Pt. Pt stated \"I don't want to leave now. I trust my doctor and he said have the tests done. \"

## 2019-09-15 NOTE — ED PROVIDER NOTES
Patient Seen in: [facility] Emergency Department    History   Patient presents with:  Abdomen/Flank Pain (GI/)  Pain (neurologic)    Stated Complaint: LUQ pain; leg leg cramps    HPI    68-year-old male coming in with left upper quadrant epigastri Device None (Room air)       Current:/76   Pulse 92   Temp 97.6 °F (36.4 °C) (Temporal)   Resp 20   Ht 185.4 cm (6' 1\")   Wt 106.6 kg   SpO2 96%   BMI 31.01 kg/m²         Physical Exam   Constitutional: He is oriented to person, place, and time.  He Notable for the following components:       Result Value    Glucose 161 (*)     BUN 21 (*)     AST 14 (*)     All other components within normal limits   LIPASE - Normal   MAGNESIUM - Normal   CBC WITH DIFFERENTIAL WITH PLATELET    Narrative:      The latrice ductal dilatation, or atrophy. SPLEEN:  No enlargement or focal lesion. KIDNEYS:  No mass, obstruction, or calcification. ADRENALS:  No mass or enlargement. AORTA/VASCULAR:  No aneurysm or dissection.       RETROPERITONEUM:  No mass or a discharge      Disposition and Plan     Clinical Impression:  Abdominal pain of unknown etiology  (primary encounter diagnosis)  Muscle cramps  Bronchospasm    Disposition:  [place]  9/14/2019 10:29 pm    Follow-up:  Jose Skinner 43 6

## 2019-09-15 NOTE — ED NOTES
This RN at bedside with the [place] form for Pt to sign. Pt declined to sign the form. Pt called his PMD - Dr. Prachi Christiansen and wants this RN to speak with him.  Pt now stating that he changed his mind and no longer signing out [place] after he spoke to his PCP jovanni

## 2019-09-16 LAB
ATRIAL RATE: 92 BPM
P AXIS: 77 DEGREES
P-R INTERVAL: 158 MS
Q-T INTERVAL: 368 MS
QRS DURATION: 126 MS
QTC CALCULATION (BEZET): 455 MS
R AXIS: 15 DEGREES
T AXIS: 54 DEGREES
VENTRICULAR RATE: 92 BPM

## 2019-12-27 PROBLEM — L97.511 ULCERATED, FOOT, RIGHT, LIMITED TO BREAKDOWN OF SKIN (HCC): Status: ACTIVE | Noted: 2019-12-27

## 2019-12-27 PROBLEM — I73.9 PERIPHERAL VASCULAR DISEASE (HCC): Status: ACTIVE | Noted: 2019-12-27

## 2021-07-11 ENCOUNTER — ANESTHESIA EVENT (OUTPATIENT)
Dept: SURGERY | Facility: HOSPITAL | Age: 66
DRG: 329 | End: 2021-07-11
Payer: MEDICARE

## 2021-07-11 ENCOUNTER — ANESTHESIA (OUTPATIENT)
Dept: SURGERY | Facility: HOSPITAL | Age: 66
DRG: 329 | End: 2021-07-11
Payer: MEDICARE

## 2021-07-11 ENCOUNTER — APPOINTMENT (OUTPATIENT)
Dept: GENERAL RADIOLOGY | Facility: HOSPITAL | Age: 66
DRG: 329 | End: 2021-07-11
Attending: EMERGENCY MEDICINE
Payer: MEDICARE

## 2021-07-11 ENCOUNTER — HOSPITAL ENCOUNTER (INPATIENT)
Facility: HOSPITAL | Age: 66
LOS: 6 days | Discharge: HOME | DRG: 329 | End: 2021-07-18
Attending: EMERGENCY MEDICINE | Admitting: INTERNAL MEDICINE
Payer: MEDICARE

## 2021-07-11 ENCOUNTER — APPOINTMENT (OUTPATIENT)
Dept: CT IMAGING | Facility: HOSPITAL | Age: 66
DRG: 329 | End: 2021-07-11
Attending: EMERGENCY MEDICINE
Payer: MEDICARE

## 2021-07-11 DIAGNOSIS — K56.609 SMALL BOWEL OBSTRUCTION (HCC): Primary | ICD-10-CM

## 2021-07-11 DIAGNOSIS — K56.609 BOWEL OBSTRUCTION (HCC): ICD-10-CM

## 2021-07-11 PROBLEM — E87.1 HYPONATREMIA: Status: ACTIVE | Noted: 2021-07-11

## 2021-07-11 PROBLEM — I10 BENIGN ESSENTIAL HTN: Status: ACTIVE | Noted: 2019-03-13

## 2021-07-11 LAB
ALBUMIN SERPL-MCNC: 3.7 G/DL (ref 3.4–5)
ALBUMIN/GLOB SERPL: 1 {RATIO} (ref 1–2)
ALP LIVER SERPL-CCNC: 80 U/L
ALT SERPL-CCNC: 28 U/L
ANION GAP SERPL CALC-SCNC: 3 MMOL/L (ref 0–18)
AST SERPL-CCNC: 27 U/L (ref 15–37)
BASOPHILS # BLD AUTO: 0.05 X10(3) UL (ref 0–0.2)
BASOPHILS NFR BLD AUTO: 0.6 %
BILIRUB SERPL-MCNC: 0.5 MG/DL (ref 0.1–2)
BUN BLD-MCNC: 12 MG/DL (ref 7–18)
BUN/CREAT SERPL: 15.8 (ref 10–20)
CALCIUM BLD-MCNC: 8.7 MG/DL (ref 8.5–10.1)
CHLORIDE SERPL-SCNC: 103 MMOL/L (ref 98–112)
CO2 SERPL-SCNC: 28 MMOL/L (ref 21–32)
CREAT BLD-MCNC: 0.76 MG/DL
DEPRECATED RDW RBC AUTO: 40.5 FL (ref 35.1–46.3)
EOSINOPHIL # BLD AUTO: 0.11 X10(3) UL (ref 0–0.7)
EOSINOPHIL NFR BLD AUTO: 1.4 %
ERYTHROCYTE [DISTWIDTH] IN BLOOD BY AUTOMATED COUNT: 12.3 % (ref 11–15)
GLOBULIN PLAS-MCNC: 3.6 G/DL (ref 2.8–4.4)
GLUCOSE BLD-MCNC: 206 MG/DL (ref 70–99)
HCT VFR BLD AUTO: 42.8 %
HGB BLD-MCNC: 13.9 G/DL
IMM GRANULOCYTES # BLD AUTO: 0.03 X10(3) UL (ref 0–1)
IMM GRANULOCYTES NFR BLD: 0.4 %
LIPASE SERPL-CCNC: 43 U/L (ref 73–393)
LYMPHOCYTES # BLD AUTO: 1.19 X10(3) UL (ref 1–4)
LYMPHOCYTES NFR BLD AUTO: 14.9 %
M PROTEIN MFR SERPL ELPH: 7.3 G/DL (ref 6.4–8.2)
MCH RBC QN AUTO: 29.6 PG (ref 26–34)
MCHC RBC AUTO-ENTMCNC: 32.5 G/DL (ref 31–37)
MCV RBC AUTO: 91.3 FL
MONOCYTES # BLD AUTO: 0.65 X10(3) UL (ref 0.1–1)
MONOCYTES NFR BLD AUTO: 8.1 %
NEUTROPHILS # BLD AUTO: 5.97 X10 (3) UL (ref 1.5–7.7)
NEUTROPHILS # BLD AUTO: 5.97 X10(3) UL (ref 1.5–7.7)
NEUTROPHILS NFR BLD AUTO: 74.6 %
OSMOLALITY SERPL CALC.SUM OF ELEC: 284 MOSM/KG (ref 275–295)
PLATELET # BLD AUTO: 210 10(3)UL (ref 150–450)
POTASSIUM SERPL-SCNC: 4.1 MMOL/L (ref 3.5–5.1)
RBC # BLD AUTO: 4.69 X10(6)UL
SARS-COV-2 RNA RESP QL NAA+PROBE: NOT DETECTED
SODIUM SERPL-SCNC: 134 MMOL/L (ref 136–145)
WBC # BLD AUTO: 8 X10(3) UL (ref 4–11)

## 2021-07-11 PROCEDURE — 0WPF0JZ REMOVAL OF SYNTHETIC SUBSTITUTE FROM ABDOMINAL WALL, OPEN APPROACH: ICD-10-PCS | Performed by: SURGERY

## 2021-07-11 PROCEDURE — 99223 1ST HOSP IP/OBS HIGH 75: CPT | Performed by: SURGERY

## 2021-07-11 PROCEDURE — 99223 1ST HOSP IP/OBS HIGH 75: CPT | Performed by: INTERNAL MEDICINE

## 2021-07-11 PROCEDURE — 44120 REMOVAL OF SMALL INTESTINE: CPT | Performed by: PHYSICIAN ASSISTANT

## 2021-07-11 PROCEDURE — 0DB80ZZ EXCISION OF SMALL INTESTINE, OPEN APPROACH: ICD-10-PCS | Performed by: SURGERY

## 2021-07-11 PROCEDURE — 71045 X-RAY EXAM CHEST 1 VIEW: CPT | Performed by: EMERGENCY MEDICINE

## 2021-07-11 PROCEDURE — 0DN80ZZ RELEASE SMALL INTESTINE, OPEN APPROACH: ICD-10-PCS | Performed by: SURGERY

## 2021-07-11 PROCEDURE — 74177 CT ABD & PELVIS W/CONTRAST: CPT | Performed by: EMERGENCY MEDICINE

## 2021-07-11 RX ORDER — LIDOCAINE HYDROCHLORIDE 20 MG/ML
10 JELLY TOPICAL ONCE
Status: COMPLETED | OUTPATIENT
Start: 2021-07-11 | End: 2021-07-11

## 2021-07-11 RX ORDER — ONDANSETRON 2 MG/ML
4 INJECTION INTRAMUSCULAR; INTRAVENOUS ONCE
Status: COMPLETED | OUTPATIENT
Start: 2021-07-11 | End: 2021-07-11

## 2021-07-11 RX ORDER — HYDROMORPHONE HYDROCHLORIDE 1 MG/ML
1 INJECTION, SOLUTION INTRAMUSCULAR; INTRAVENOUS; SUBCUTANEOUS EVERY 30 MIN PRN
Status: COMPLETED | OUTPATIENT
Start: 2021-07-11 | End: 2021-07-11

## 2021-07-11 RX ORDER — ONDANSETRON 2 MG/ML
4 INJECTION INTRAMUSCULAR; INTRAVENOUS EVERY 4 HOURS PRN
Status: CANCELLED | OUTPATIENT
Start: 2021-07-11 | End: 2021-07-11

## 2021-07-11 RX ORDER — DEXAMETHASONE SODIUM PHOSPHATE 4 MG/ML
VIAL (ML) INJECTION AS NEEDED
Status: DISCONTINUED | OUTPATIENT
Start: 2021-07-11 | End: 2021-07-12 | Stop reason: SURG

## 2021-07-11 RX ORDER — HYDROMORPHONE HYDROCHLORIDE 1 MG/ML
0.5 INJECTION, SOLUTION INTRAMUSCULAR; INTRAVENOUS; SUBCUTANEOUS EVERY 30 MIN PRN
Status: CANCELLED | OUTPATIENT
Start: 2021-07-11 | End: 2021-07-11

## 2021-07-11 RX ORDER — IPRATROPIUM BROMIDE AND ALBUTEROL SULFATE 2.5; .5 MG/3ML; MG/3ML
3 SOLUTION RESPIRATORY (INHALATION) ONCE
Status: COMPLETED | OUTPATIENT
Start: 2021-07-11 | End: 2021-07-11

## 2021-07-11 RX ORDER — SODIUM CHLORIDE 9 MG/ML
INJECTION, SOLUTION INTRAVENOUS CONTINUOUS PRN
Status: DISCONTINUED | OUTPATIENT
Start: 2021-07-11 | End: 2021-07-12 | Stop reason: SURG

## 2021-07-11 RX ORDER — CEFOXITIN 2 G/1
INJECTION, POWDER, FOR SOLUTION INTRAVENOUS AS NEEDED
Status: DISCONTINUED | OUTPATIENT
Start: 2021-07-11 | End: 2021-07-12 | Stop reason: SURG

## 2021-07-11 RX ORDER — PHENYLEPHRINE HCL 10 MG/ML
VIAL (ML) INJECTION AS NEEDED
Status: DISCONTINUED | OUTPATIENT
Start: 2021-07-11 | End: 2021-07-12 | Stop reason: SURG

## 2021-07-11 RX ORDER — SODIUM CHLORIDE 9 MG/ML
INJECTION, SOLUTION INTRAVENOUS CONTINUOUS
Status: CANCELLED | OUTPATIENT
Start: 2021-07-11 | End: 2021-07-11

## 2021-07-11 RX ORDER — HEPARIN SODIUM 5000 [USP'U]/ML
5000 INJECTION, SOLUTION INTRAVENOUS; SUBCUTANEOUS EVERY 8 HOURS SCHEDULED
Status: CANCELLED | OUTPATIENT
Start: 2021-07-11

## 2021-07-11 RX ORDER — ALBUTEROL SULFATE 90 UG/1
AEROSOL, METERED RESPIRATORY (INHALATION) AS NEEDED
Status: DISCONTINUED | OUTPATIENT
Start: 2021-07-11 | End: 2021-07-12 | Stop reason: SURG

## 2021-07-11 RX ORDER — LIDOCAINE HYDROCHLORIDE 10 MG/ML
INJECTION, SOLUTION EPIDURAL; INFILTRATION; INTRACAUDAL; PERINEURAL AS NEEDED
Status: DISCONTINUED | OUTPATIENT
Start: 2021-07-11 | End: 2021-07-12 | Stop reason: SURG

## 2021-07-11 RX ORDER — ROCURONIUM BROMIDE 10 MG/ML
INJECTION, SOLUTION INTRAVENOUS AS NEEDED
Status: DISCONTINUED | OUTPATIENT
Start: 2021-07-11 | End: 2021-07-12 | Stop reason: SURG

## 2021-07-11 RX ADMIN — PHENYLEPHRINE HCL 100 MCG: 10 MG/ML VIAL (ML) INJECTION at 23:35:00

## 2021-07-11 RX ADMIN — LIDOCAINE HYDROCHLORIDE 100 MG: 10 INJECTION, SOLUTION EPIDURAL; INFILTRATION; INTRACAUDAL; PERINEURAL at 21:21:00

## 2021-07-11 RX ADMIN — PHENYLEPHRINE HCL 100 MCG: 10 MG/ML VIAL (ML) INJECTION at 23:26:00

## 2021-07-11 RX ADMIN — ROCURONIUM BROMIDE 10 MG: 10 INJECTION, SOLUTION INTRAVENOUS at 21:31:00

## 2021-07-11 RX ADMIN — ALBUTEROL SULFATE 4 PUFF: 90 AEROSOL, METERED RESPIRATORY (INHALATION) at 21:27:00

## 2021-07-11 RX ADMIN — ROCURONIUM BROMIDE 10 MG: 10 INJECTION, SOLUTION INTRAVENOUS at 22:51:00

## 2021-07-11 RX ADMIN — ROCURONIUM BROMIDE 10 MG: 10 INJECTION, SOLUTION INTRAVENOUS at 23:23:00

## 2021-07-11 RX ADMIN — CEFOXITIN 2 G: 2 INJECTION, POWDER, FOR SOLUTION INTRAVENOUS at 23:37:00

## 2021-07-11 RX ADMIN — PHENYLEPHRINE HCL 100 MCG: 10 MG/ML VIAL (ML) INJECTION at 22:57:00

## 2021-07-11 RX ADMIN — ROCURONIUM BROMIDE 10 MG: 10 INJECTION, SOLUTION INTRAVENOUS at 22:25:00

## 2021-07-11 RX ADMIN — CEFOXITIN 2 G: 2 INJECTION, POWDER, FOR SOLUTION INTRAVENOUS at 21:37:00

## 2021-07-11 RX ADMIN — ROCURONIUM BROMIDE 20 MG: 10 INJECTION, SOLUTION INTRAVENOUS at 21:42:00

## 2021-07-11 RX ADMIN — DEXAMETHASONE SODIUM PHOSPHATE 4 MG: 4 MG/ML VIAL (ML) INJECTION at 21:37:00

## 2021-07-11 RX ADMIN — SODIUM CHLORIDE: 9 INJECTION, SOLUTION INTRAVENOUS at 21:14:00

## 2021-07-11 NOTE — ED PROVIDER NOTES
Patient Seen in: [facility] Emergency Department      History   Patient presents with:  Abdomen/Flank Pain    Stated Complaint: abd pain    HPI/Subjective:   HPI    Patient presents with abdominal pain.   The patient states that he has a mass in his Current:BP (!) 172/99   Pulse 91   Temp 97.6 °F (36.4 °C) (Temporal)   Resp 19   Ht 193 cm (6' 4\")   Wt 102.1 kg   SpO2 92%   BMI 27.39 kg/m²         Physical Exam    General: Alert and oriented x3 in no acute distress.   HEENT: Normocephalic, atraum consistent with COPD. Cardiomegaly. Normal pulmonary vascularity. Scattered prominent interstitial markings in both lungs. NG tube tip is in the stomach. .    Dictated by (CST): Luciana Linares MD on 7/11/2021 at 8:15 PM     Finalized by (CST): Suzette Oreilly postsurgical changes PELVIC ORGANS:  Prostatic hypertrophy. BONES:  Mild degenerative changes in the lower lumbar facets. CONCLUSION:  There is a small bowel obstruction with transition point in the central abdomen.   There are decompressed mor bowel obstruction ([facility] [address]  (primary encounter diagnosis)     Disposition:  Send to or/cath/gi  7/11/2021  7:51 pm    Follow-up:  No follow-up provider specified.         Medications Prescribed:  Current Discharge Medication List
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
## 2021-07-12 ENCOUNTER — APPOINTMENT (OUTPATIENT)
Dept: GENERAL RADIOLOGY | Facility: HOSPITAL | Age: 66
DRG: 329 | End: 2021-07-12
Attending: PODIATRIST
Payer: MEDICARE

## 2021-07-12 ENCOUNTER — APPOINTMENT (OUTPATIENT)
Dept: MRI IMAGING | Facility: HOSPITAL | Age: 66
DRG: 329 | End: 2021-07-12
Attending: INTERNAL MEDICINE
Payer: MEDICARE

## 2021-07-12 LAB
ANION GAP SERPL CALC-SCNC: 4 MMOL/L (ref 0–18)
BASOPHILS # BLD AUTO: 0.03 X10(3) UL (ref 0–0.2)
BASOPHILS NFR BLD AUTO: 0.2 %
BUN BLD-MCNC: 18 MG/DL (ref 7–18)
BUN/CREAT SERPL: 16.5 (ref 10–20)
CALCIUM BLD-MCNC: 8.2 MG/DL (ref 8.5–10.1)
CHLORIDE SERPL-SCNC: 105 MMOL/L (ref 98–112)
CO2 SERPL-SCNC: 27 MMOL/L (ref 21–32)
CREAT BLD-MCNC: 1.09 MG/DL
DEPRECATED RDW RBC AUTO: 42.5 FL (ref 35.1–46.3)
EOSINOPHIL # BLD AUTO: 0 X10(3) UL (ref 0–0.7)
EOSINOPHIL NFR BLD AUTO: 0 %
ERYTHROCYTE [DISTWIDTH] IN BLOOD BY AUTOMATED COUNT: 12.1 % (ref 11–15)
EST. AVERAGE GLUCOSE BLD GHB EST-MCNC: 123 MG/DL (ref 68–126)
GLUCOSE BLD-MCNC: 176 MG/DL (ref 70–99)
GLUCOSE BLD-MCNC: 184 MG/DL (ref 70–99)
GLUCOSE BLD-MCNC: 198 MG/DL (ref 70–99)
GLUCOSE BLD-MCNC: 213 MG/DL (ref 70–99)
GLUCOSE BLD-MCNC: 227 MG/DL (ref 70–99)
HBA1C MFR BLD HPLC: 5.9 % (ref ?–5.7)
HCT VFR BLD AUTO: 48.7 %
HGB BLD-MCNC: 15 G/DL
IMM GRANULOCYTES # BLD AUTO: 0.03 X10(3) UL (ref 0–1)
IMM GRANULOCYTES NFR BLD: 0.2 %
LYMPHOCYTES # BLD AUTO: 0.24 X10(3) UL (ref 1–4)
LYMPHOCYTES NFR BLD AUTO: 1.9 %
MCH RBC QN AUTO: 29.2 PG (ref 26–34)
MCHC RBC AUTO-ENTMCNC: 30.8 G/DL (ref 31–37)
MCV RBC AUTO: 94.9 FL
MONOCYTES # BLD AUTO: 0.96 X10(3) UL (ref 0.1–1)
MONOCYTES NFR BLD AUTO: 7.8 %
NEUTROPHILS # BLD AUTO: 11.08 X10 (3) UL (ref 1.5–7.7)
NEUTROPHILS # BLD AUTO: 11.08 X10(3) UL (ref 1.5–7.7)
NEUTROPHILS NFR BLD AUTO: 89.9 %
OSMOLALITY SERPL CALC.SUM OF ELEC: 290 MOSM/KG (ref 275–295)
PLATELET # BLD AUTO: 238 10(3)UL (ref 150–450)
POTASSIUM SERPL-SCNC: 5.5 MMOL/L (ref 3.5–5.1)
RBC # BLD AUTO: 5.13 X10(6)UL
SODIUM SERPL-SCNC: 136 MMOL/L (ref 136–145)
WBC # BLD AUTO: 12.3 X10(3) UL (ref 4–11)

## 2021-07-12 PROCEDURE — 73630 X-RAY EXAM OF FOOT: CPT | Performed by: PODIATRIST

## 2021-07-12 PROCEDURE — 99232 SBSQ HOSP IP/OBS MODERATE 35: CPT | Performed by: INTERNAL MEDICINE

## 2021-07-12 PROCEDURE — 99221 1ST HOSP IP/OBS SF/LOW 40: CPT | Performed by: PODIATRIST

## 2021-07-12 PROCEDURE — 73720 MRI LWR EXTREMITY W/O&W/DYE: CPT | Performed by: INTERNAL MEDICINE

## 2021-07-12 PROCEDURE — 44120 REMOVAL OF SMALL INTESTINE: CPT | Performed by: SURGERY

## 2021-07-12 RX ORDER — HYDROMORPHONE HYDROCHLORIDE 1 MG/ML
0.4 INJECTION, SOLUTION INTRAMUSCULAR; INTRAVENOUS; SUBCUTANEOUS EVERY 2 HOUR PRN
Status: DISCONTINUED | OUTPATIENT
Start: 2021-07-12 | End: 2021-07-18

## 2021-07-12 RX ORDER — FAMOTIDINE 10 MG/ML
20 INJECTION, SOLUTION INTRAVENOUS 2 TIMES DAILY
Status: DISCONTINUED | OUTPATIENT
Start: 2021-07-12 | End: 2021-07-13

## 2021-07-12 RX ORDER — HYDROMORPHONE HYDROCHLORIDE 1 MG/ML
0.4 INJECTION, SOLUTION INTRAMUSCULAR; INTRAVENOUS; SUBCUTANEOUS EVERY 2 HOUR PRN
Status: DISCONTINUED | OUTPATIENT
Start: 2021-07-12 | End: 2021-07-15

## 2021-07-12 RX ORDER — HYDRALAZINE HYDROCHLORIDE 20 MG/ML
10 INJECTION INTRAMUSCULAR; INTRAVENOUS EVERY 4 HOURS PRN
Status: DISCONTINUED | OUTPATIENT
Start: 2021-07-12 | End: 2021-07-18

## 2021-07-12 RX ORDER — SODIUM PHOSPHATE, DIBASIC AND SODIUM PHOSPHATE, MONOBASIC 7; 19 G/133ML; G/133ML
1 ENEMA RECTAL ONCE AS NEEDED
Status: DISCONTINUED | OUTPATIENT
Start: 2021-07-12 | End: 2021-07-18

## 2021-07-12 RX ORDER — HEPARIN SODIUM 5000 [USP'U]/ML
5000 INJECTION, SOLUTION INTRAVENOUS; SUBCUTANEOUS EVERY 8 HOURS SCHEDULED
Status: DISCONTINUED | OUTPATIENT
Start: 2021-07-12 | End: 2021-07-18

## 2021-07-12 RX ORDER — HYDROMORPHONE HYDROCHLORIDE 1 MG/ML
0.2 INJECTION, SOLUTION INTRAMUSCULAR; INTRAVENOUS; SUBCUTANEOUS EVERY 2 HOUR PRN
Status: DISCONTINUED | OUTPATIENT
Start: 2021-07-12 | End: 2021-07-18

## 2021-07-12 RX ORDER — BISACODYL 10 MG
10 SUPPOSITORY, RECTAL RECTAL
Status: DISCONTINUED | OUTPATIENT
Start: 2021-07-12 | End: 2021-07-18

## 2021-07-12 RX ORDER — HYDROMORPHONE HYDROCHLORIDE 1 MG/ML
0.8 INJECTION, SOLUTION INTRAMUSCULAR; INTRAVENOUS; SUBCUTANEOUS EVERY 2 HOUR PRN
Status: DISCONTINUED | OUTPATIENT
Start: 2021-07-12 | End: 2021-07-18

## 2021-07-12 RX ORDER — ALBUTEROL SULFATE 90 UG/1
1 AEROSOL, METERED RESPIRATORY (INHALATION) EVERY 6 HOURS PRN
Status: DISCONTINUED | OUTPATIENT
Start: 2021-07-12 | End: 2021-07-18

## 2021-07-12 RX ORDER — HYDROMORPHONE HYDROCHLORIDE 1 MG/ML
0.8 INJECTION, SOLUTION INTRAMUSCULAR; INTRAVENOUS; SUBCUTANEOUS EVERY 2 HOUR PRN
Status: DISCONTINUED | OUTPATIENT
Start: 2021-07-12 | End: 2021-07-15

## 2021-07-12 RX ORDER — OXYCODONE HYDROCHLORIDE 5 MG/1
5 TABLET ORAL EVERY 4 HOURS PRN
Status: DISCONTINUED | OUTPATIENT
Start: 2021-07-12 | End: 2021-07-18

## 2021-07-12 RX ORDER — DEXTROSE MONOHYDRATE 25 G/50ML
50 INJECTION, SOLUTION INTRAVENOUS
Status: DISCONTINUED | OUTPATIENT
Start: 2021-07-12 | End: 2021-07-18

## 2021-07-12 RX ORDER — ONDANSETRON 2 MG/ML
4 INJECTION INTRAMUSCULAR; INTRAVENOUS AS NEEDED
Status: DISCONTINUED | OUTPATIENT
Start: 2021-07-12 | End: 2021-07-12 | Stop reason: HOSPADM

## 2021-07-12 RX ORDER — MIDAZOLAM HYDROCHLORIDE 1 MG/ML
1 INJECTION INTRAMUSCULAR; INTRAVENOUS EVERY 5 MIN PRN
Status: DISCONTINUED | OUTPATIENT
Start: 2021-07-12 | End: 2021-07-12 | Stop reason: HOSPADM

## 2021-07-12 RX ORDER — ONDANSETRON 2 MG/ML
4 INJECTION INTRAMUSCULAR; INTRAVENOUS EVERY 6 HOURS PRN
Status: DISCONTINUED | OUTPATIENT
Start: 2021-07-12 | End: 2021-07-18

## 2021-07-12 RX ORDER — LABETALOL HYDROCHLORIDE 5 MG/ML
INJECTION, SOLUTION INTRAVENOUS
Status: DISCONTINUED
Start: 2021-07-12 | End: 2021-07-12 | Stop reason: WASHOUT

## 2021-07-12 RX ORDER — ONDANSETRON 2 MG/ML
INJECTION INTRAMUSCULAR; INTRAVENOUS AS NEEDED
Status: DISCONTINUED | OUTPATIENT
Start: 2021-07-12 | End: 2021-07-12 | Stop reason: SURG

## 2021-07-12 RX ORDER — LABETALOL HYDROCHLORIDE 5 MG/ML
5 INJECTION, SOLUTION INTRAVENOUS EVERY 5 MIN PRN
Status: DISCONTINUED | OUTPATIENT
Start: 2021-07-12 | End: 2021-07-12 | Stop reason: HOSPADM

## 2021-07-12 RX ORDER — HYDROMORPHONE HYDROCHLORIDE 1 MG/ML
0.5 INJECTION, SOLUTION INTRAMUSCULAR; INTRAVENOUS; SUBCUTANEOUS EVERY 5 MIN PRN
Status: DISCONTINUED | OUTPATIENT
Start: 2021-07-12 | End: 2021-07-12 | Stop reason: HOSPADM

## 2021-07-12 RX ORDER — SODIUM CHLORIDE, SODIUM LACTATE, POTASSIUM CHLORIDE, CALCIUM CHLORIDE 600; 310; 30; 20 MG/100ML; MG/100ML; MG/100ML; MG/100ML
INJECTION, SOLUTION INTRAVENOUS CONTINUOUS
Status: DISCONTINUED | OUTPATIENT
Start: 2021-07-12 | End: 2021-07-12 | Stop reason: HOSPADM

## 2021-07-12 RX ORDER — DOCUSATE SODIUM 100 MG/1
100 CAPSULE, LIQUID FILLED ORAL 2 TIMES DAILY
Status: DISCONTINUED | OUTPATIENT
Start: 2021-07-12 | End: 2021-07-18

## 2021-07-12 RX ORDER — ONDANSETRON 2 MG/ML
4 INJECTION INTRAMUSCULAR; INTRAVENOUS EVERY 6 HOURS PRN
Status: DISCONTINUED | OUTPATIENT
Start: 2021-07-12 | End: 2021-07-15

## 2021-07-12 RX ORDER — PROCHLORPERAZINE EDISYLATE 5 MG/ML
5 INJECTION INTRAMUSCULAR; INTRAVENOUS EVERY 8 HOURS PRN
Status: DISCONTINUED | OUTPATIENT
Start: 2021-07-12 | End: 2021-07-18

## 2021-07-12 RX ORDER — DEXTROSE MONOHYDRATE 25 G/50ML
50 INJECTION, SOLUTION INTRAVENOUS
Status: DISCONTINUED | OUTPATIENT
Start: 2021-07-12 | End: 2021-07-12 | Stop reason: HOSPADM

## 2021-07-12 RX ORDER — POLYETHYLENE GLYCOL 3350 17 G/17G
17 POWDER, FOR SOLUTION ORAL DAILY PRN
Status: DISCONTINUED | OUTPATIENT
Start: 2021-07-12 | End: 2021-07-18

## 2021-07-12 RX ORDER — IPRATROPIUM BROMIDE AND ALBUTEROL SULFATE 2.5; .5 MG/3ML; MG/3ML
3 SOLUTION RESPIRATORY (INHALATION) EVERY 6 HOURS PRN
Status: DISCONTINUED | OUTPATIENT
Start: 2021-07-12 | End: 2021-07-13

## 2021-07-12 RX ORDER — KETOROLAC TROMETHAMINE 30 MG/ML
INJECTION, SOLUTION INTRAMUSCULAR; INTRAVENOUS AS NEEDED
Status: DISCONTINUED | OUTPATIENT
Start: 2021-07-12 | End: 2021-07-12 | Stop reason: SURG

## 2021-07-12 RX ORDER — OXYCODONE HYDROCHLORIDE 10 MG/1
10 TABLET ORAL EVERY 4 HOURS PRN
Status: DISCONTINUED | OUTPATIENT
Start: 2021-07-12 | End: 2021-07-18

## 2021-07-12 RX ORDER — SODIUM CHLORIDE 9 MG/ML
INJECTION, SOLUTION INTRAVENOUS CONTINUOUS
Status: DISCONTINUED | OUTPATIENT
Start: 2021-07-12 | End: 2021-07-18

## 2021-07-12 RX ORDER — MEPERIDINE HYDROCHLORIDE 25 MG/ML
12.5 INJECTION INTRAMUSCULAR; INTRAVENOUS; SUBCUTANEOUS AS NEEDED
Status: DISCONTINUED | OUTPATIENT
Start: 2021-07-12 | End: 2021-07-12 | Stop reason: HOSPADM

## 2021-07-12 RX ORDER — NALOXONE HYDROCHLORIDE 0.4 MG/ML
80 INJECTION, SOLUTION INTRAMUSCULAR; INTRAVENOUS; SUBCUTANEOUS AS NEEDED
Status: DISCONTINUED | OUTPATIENT
Start: 2021-07-12 | End: 2021-07-12 | Stop reason: HOSPADM

## 2021-07-12 RX ORDER — HYDROMORPHONE HYDROCHLORIDE 1 MG/ML
INJECTION, SOLUTION INTRAMUSCULAR; INTRAVENOUS; SUBCUTANEOUS
Status: DISCONTINUED
Start: 2021-07-12 | End: 2021-07-12 | Stop reason: WASHOUT

## 2021-07-12 RX ORDER — HYDROMORPHONE HYDROCHLORIDE 1 MG/ML
INJECTION, SOLUTION INTRAMUSCULAR; INTRAVENOUS; SUBCUTANEOUS
Status: COMPLETED
Start: 2021-07-12 | End: 2021-07-12

## 2021-07-12 RX ORDER — SODIUM CHLORIDE 9 MG/ML
INJECTION, SOLUTION INTRAVENOUS CONTINUOUS
Status: DISCONTINUED | OUTPATIENT
Start: 2021-07-12 | End: 2021-07-13

## 2021-07-12 RX ORDER — ACETAMINOPHEN 10 MG/ML
1000 INJECTION, SOLUTION INTRAVENOUS EVERY 6 HOURS PRN
Status: DISCONTINUED | OUTPATIENT
Start: 2021-07-12 | End: 2021-07-18

## 2021-07-12 RX ORDER — INSULIN ASPART 100 [IU]/ML
INJECTION, SOLUTION INTRAVENOUS; SUBCUTANEOUS ONCE
Status: COMPLETED | OUTPATIENT
Start: 2021-07-12 | End: 2021-07-12

## 2021-07-12 RX ORDER — FAMOTIDINE 20 MG/1
20 TABLET ORAL 2 TIMES DAILY
Status: DISCONTINUED | OUTPATIENT
Start: 2021-07-12 | End: 2021-07-13

## 2021-07-12 RX ORDER — INSULIN ASPART 100 [IU]/ML
INJECTION, SOLUTION INTRAVENOUS; SUBCUTANEOUS
Status: COMPLETED
Start: 2021-07-12 | End: 2021-07-12

## 2021-07-12 RX ADMIN — ONDANSETRON 4 MG: 2 INJECTION INTRAMUSCULAR; INTRAVENOUS at 00:06:00

## 2021-07-12 RX ADMIN — KETOROLAC TROMETHAMINE 30 MG: 30 INJECTION, SOLUTION INTRAMUSCULAR; INTRAVENOUS at 00:16:00

## 2021-07-12 NOTE — PLAN OF CARE
A&O x4. Denies any CP, LEVI, or calf pain at present. Lungs clear and diminished in bases bilaterally. Abdomen soft, rounded, tender. Bowel sounds hypoactive - pt denies any belching or passing gas.  Midline incision c/d/I with aquacel x2 - small amount of s NEEDED:  - Assess patient for physical needs  - Identify cognitive and physical deficits and behaviors that affect risk of falls  - [place] fall precautions as indicated by assessment  - Educate patient/family on patient safety including physical imitati

## 2021-07-12 NOTE — PLAN OF CARE
Pt VSS. AOx4, drowsy/tired post-op. Pt remains NPO; IVF infusing + ABX per MD order. No nausea since admission to SCU floor. Bowel sounds hypoactive. Incision covered with aquacel dressing. NG to LIS; small amounts brown drainage noted in tubing/cannister. [place] fall precautions as indicated by assessment  - Educate patient/family on patient safety including physical imitations  - Instruct patient to call for assistance with activity based on assessment  - Modify environment to reduce risk of injury  - P

## 2021-07-12 NOTE — ANESTHESIA PROCEDURE NOTES
Airway  Date/Time: 7/11/2021 9:33 PM  Urgency: elective    Airway not difficult    General Information and Staff    Patient location during procedure: OR  Anesthesiologist: Mary Paloom MD  Performed: anesthesiologist     Indications and Patient

## 2021-07-12 NOTE — CM/SW NOTE
Patient is known to this writer. Patient has reported that he is the his wife's guardian Carol Cardoza [phone]. He currently has two minor children aged 15 and 6. According to previous notes from 2019 there was a DCFS case filed.  RN called DCFS today and t

## 2021-07-12 NOTE — PAYOR COMM NOTE
--------------  ADMISSION REVIEW     Payor: [address] [place], [place] [address] #:  [id number]  Authorization Number: [id number]       ED Provider Notes        History   Patient presents with:  Abdomen/Flank Pain    Stated Complaint: abd pain    HPI/Subj distress. HEENT: Normocephalic, atraumatic, pupils equal round and reactive to light. Neck: Supple. Cardiovascular: Regular rate and rhythm. Respiratory: Lungs with moderate air entry and scattered wheezing bilaterally.   Abdomen: Soft, palpable mass in Dose reduction techniques were used. Dose information is transmitted to the [facility]) NRDR ([address] [place] [address] Rd) which includes the Dose Index Registry.   PATIENT STATED HISTORY:(As transcribed by Technologist)  Right mL (3 mL Nebulization Given 7/11/21 1743)   HYDROmorphone HCl (DILAUDID) 1 MG/ML injection 1 mg (1 mg Intravenous Given 7/11/21 2024)   ondansetron HCl (ZOFRAN) injection 4 mg (4 mg Intravenous Given 7/11/21 1738)   iohexol (OMNIPAQUE) 350 MG/ML injection 193 cm (6' 4\")   Wt 225 lb (102.1 kg)   SpO2 93%   BMI 27.39 kg/m²   General: No acute distress. Alert and oriented x 3. NG in place  HEENT: Normocephalic atraumatic. Moist mucous membranes. EOM-I. PERRLA. Anicteric. Neck: No lymphadenopathy. No JVD.  No to the procedure were explained to the patient. The risks explained include, but are not limited to, bleeding, infection, pain wound complications, recurrence, incorrect diagnosis, injury to adjacent organs and structures.  We also discussed the possibile following  1. R great toe ulcerations   1. Podiatry and wound care consulted  2. Hyponatremia - improved with IVF  2. HTN  1. Resume home meds when taking PO  2. IV PRN for now  3. DM2 with hyperglycemia   1. HgbA1c 5.9%  2.  Holding PTA levemir while NPO (MEFOXIN) injection     Date Action Dose Route User    7/11/2021 2337 Given 2 g Intravenous Slick Shea MD    7/11/2021 2137 Given 2 g Intravenous Felicita Del Toro MD      dexamethasone Sodium Phosphate (DECADRON) 4 MG/ML injection     Date Action 0040 Given 0.5 mg Intravenous Matt Emery RN      insulin aspart (NOVOLOG) 100 UNIT/ML vial 2-10 Units     Date Action Dose Route User    7/12/2021 0042 Given 6 Units Subcutaneous (Left Upper Arm) Matt Emery RN      Insulin Aspart Pen (NOVOLOG) 100 UN Intravenous Vincent Lorenzo MD    7/11/2021 2258 Given 100 mcg Intravenous Vincent Lorenzo MD      Piperacillin Sod-Tazobactam So (ZOSYN) 3.375 g in dextrose 5% 100 mL IVPB-ADDV     Date Action Dose Route User    7/12/2021 0969 New Bag 3.375 g Intravenous Bu

## 2021-07-12 NOTE — ANESTHESIA PREPROCEDURE EVALUATION
PRE-OP EVALUATION    Patient Name: Lidia Lorenzo    Admit Diagnosis: No admission diagnoses are documented for this encounter.     Pre-op Diagnosis: Bowel obstruction ([facility] [address] [I54.572]    EXPLORATORY LAPAROTOMY    Anesthesia Procedure: EXPLORATORY LAPAROTOMY status: Former Smoker        Quit date: 3/11/2007        Years since quittin.3      Smokeless tobacco: Former User        Quit date: 2003      Tobacco comment: refused    Alcohol use: Yes      Comment: Pt stated only one time birthday      Drug u

## 2021-07-12 NOTE — H&P
[facility]  History and Physical     Margareth Quantico Patient Status:  Emergency    1955 MRN [id number]   Location [facility] Attending Sybil Cain MD   [address] Day # 0 PCP Rosario Henry MD     Chief Complaint 3  gabapentin 100 MG Oral Cap, Take 1 capsule (100 mg total) by mouth nightly., Disp: 90 capsule, Rfl: 0  divalproex Sodium  MG Oral Tablet 24 Hr, Take 3 tablets (1,500 mg total) by mouth nightly., Disp: 45 tablet, Rfl: 0  risperiDONE 0.5 MG Oral Tab Systems:   A comprehensive 14 point review of systems was completed. Pertinent positives and negatives noted in the HPI.     Physical Exam:    /90   Pulse 90   Temp 97.6 °F (36.4 °C) (Temporal)   Resp 20   Ht 193 cm (6' 4\")   Wt 225 lb (102.1 kg) 1. SBO  1. Continue pain control  2. NG in place  3. IVF  4. NPO  5. Surgery to eval, plan for OR tonight  2. Ess HTN  1. Resume home meds when taking PO  2. IV PRN for now  3. DM  1. SSI  4. Asthma  1.  Continue inhalers    Quality:  · DVT Prophylaxis:

## 2021-07-12 NOTE — CONSULTS
[facility]    Report of Consultation    Yoseph Busch Patient Status:  Inpatient    1955 MRN [id number]   [facility] 3NW-A Attending Cathryn Parkinson,    Hosp Day # 0 PCP Campbell Hernandez MD     Date of Admission:  2021 1 MG/ML injection 0.2 mg, 0.2 mg, Intravenous, Q2H PRN **OR** HYDROmorphone HCl (DILAUDID) 1 MG/ML injection 0.4 mg, 0.4 mg, Intravenous, Q2H PRN **OR** HYDROmorphone HCl (DILAUDID) 1 MG/ML injection 0.8 mg, 0.8 mg, Intravenous, Q2H PRN  •  ondansetron HCl UNIT/ML flexpen 1-10 Units, 1-10 Units, Subcutaneous, Q6H  •  ipratropium-albuterol (DUONEB) nebulizer solution 3 mL, 3 mL, Nebulization, Q6H PRN  •  acetaminophen (OFIRMEV) infusion 1,000 mg, 1,000 mg, Intravenous, Q6H PRN    Review of Systems:  [facility] 07/12/21  0535   RBC 5.13   HGB 15.0   HCT 48.7   MCV 94.9   MCH 29.2   MCHC 30.8*   RDW 12.1   NEPRELIM 11.08*   WBC 12.3*   .0       Impression and Plan:  Patient Active Problem List:     Shoulder pain     Recurrent ventral incisional hernia with

## 2021-07-12 NOTE — BRIEF OP NOTE
Pre-Operative Diagnosis: Bowel obstruction ([facility] [address] [K56.609]     Post-Operative Diagnosis:High- Grade SBO; contaminated mesh. Procedure Performed:     1. EXPLORATORY LAPAROTOMY   2.  SMALL BOWEL RESECTION WITH ANASTOMOSIS  3. LYSIS OF ADHESIONS (90 MINUTE

## 2021-07-12 NOTE — ED QUICK NOTES
Called [employer]CF[employer] regarding break the glass FYI flag regarding doing a safety check on children as [employer]CF[employer] is providing services to patient, case management involved, states that note was from 2019, was instructed to call DCF[employer], but they were unable to provide any i

## 2021-07-12 NOTE — PROGRESS NOTES
[facility]  Progress Note    Vibha Come Patient Status:  Inpatient    1955 MRN [id number]   [facility] 3NW-A Attending Angela Franks, DO   Hosp Day # 0 PCP Emmanuel Duncan MD     Subjective:   The patient reports he has b chips  3. Awaiting return of bowel function  4. Continue IV antibiotics-Zosyn  5.  DVT prophylaxis -Heparin  6. GI prophylaxis -Pepcid    Priyank Read PA-C  7/12/2021  8:03 AM      Addendum:    I have seen and examined the patient; I obtained and

## 2021-07-12 NOTE — PROGRESS NOTES
[facility]  Progress Note     Irma Chacon Patient Status:  Inpatient    1955 MRN [id number]   [facility] 3NW-A Attending Edwin Hawthorne, DO   Hosp Day # 0 PCP Ben Crowder MD     Chief Complaint: abdominal pain    S: 136   K 4.1 5.5*    105   CO2 28.0 27.0   ALKPHO 80  --    AST 27  --    ALT 28  --    BILT 0.5  --    TP 7.3  --        Estimated Creatinine Clearance: 84.1 mL/min (based on SCr of 1.09 mg/dL).     No results for input(s): PTP, INR in the last 168 ho be referred to TCC on discharge?: yes  Estimated date of discharge: 2 or more midnights  Discharge is dependent on: clinical state, surgery recs  At this point Mr. Cardoza is expected to be discharge to: home    Plan of care discussed with pt, BAUDILIO Marx

## 2021-07-12 NOTE — PHYSICAL THERAPY NOTE
PT attempted to see the pt for an eval this pm, however pt was irritable and agitated, refusing rehab this pm stating that he was recently up c RN staff. Pt reporting he is concerned for the onset of nausea and a HA.  This writer educated pt on the [employer]

## 2021-07-12 NOTE — OPERATIVE REPORT
[facility]    PATIENT'S NAME: SONY, [de-identified] L   ATTENDING PHYSICIAN: Sony Allison DO   OPERATING PHYSICIAN: Genna Murrieta M.D.    PATIENT ACCOUNT#:   [de-identified]    LOCATION:  [address] [place] [address]  MEDICAL RECORD #:   [id number]       DATE OF BIRTH:  [birth year] and the fact the patient has peritonitis, we recommended emergent surgical exploration. Risks, benefits, and alternatives were explained to the patient in detail.   The risks explained included, but were not limited to, bleeding, infection, injury to adjac patient's prior surgical scar with a scalpel in full thickness, followed by electrocautery, which was used to achieve hemostasis and to excise the surgical scar. Next, the fascia was divided.   There was extensive fibrosis of the subcutaneous tissues down submitted for culture and sensitivity. There were extensive adhesions between adjacent loops of small bowel. There was high-grade obstruction of the bowel down to the lower abdomen.   There was marked dilation with a clear transition point and now attenti stapler creating a wide common channel. The common opening was then secured and closed using a blue cartridge TA stapler. The resultant anastomosis was air and watertight, vascular and viable.   The suture line was then reinforced using multiple interrupt Occlusive Aquacel dressing was now used to cover the incision. At this point, the anesthesiologist placed bilateral TAP blocks. The patient was then awakened from anesthesia and brought to the recovery room in stable condition.   He tolerated the procedure

## 2021-07-12 NOTE — RESPIRATORY THERAPY NOTE
Nursing to administer 1 puff Incruse Ellipta 62.5mg daily. First dose given today by RT at 0830 . Next dose due tomorrow 7/13/21 by RN at 0900 . Per respiratory protocol. Thank you.

## 2021-07-12 NOTE — ANESTHESIA POSTPROCEDURE EVALUATION
[address] [facility] Patient Status:  Emergency   Age/Gender 61year old male MRN [id number]   Location [facility] Attending Aure Heart, [facility] Day # 0 PCP Latonia Sommers MD       Anesthesia Post-op Note    EXP

## 2021-07-13 ENCOUNTER — APPOINTMENT (OUTPATIENT)
Dept: ULTRASOUND IMAGING | Facility: HOSPITAL | Age: 66
DRG: 329 | End: 2021-07-13
Attending: PODIATRIST
Payer: MEDICARE

## 2021-07-13 LAB
BASOPHILS # BLD AUTO: 0.02 X10(3) UL (ref 0–0.2)
BASOPHILS NFR BLD AUTO: 0.2 %
DEPRECATED RDW RBC AUTO: 44.5 FL (ref 35.1–46.3)
EOSINOPHIL # BLD AUTO: 0 X10(3) UL (ref 0–0.7)
EOSINOPHIL NFR BLD AUTO: 0 %
ERYTHROCYTE [DISTWIDTH] IN BLOOD BY AUTOMATED COUNT: 12.4 % (ref 11–15)
GLUCOSE BLD-MCNC: 174 MG/DL (ref 70–99)
GLUCOSE BLD-MCNC: 187 MG/DL (ref 70–99)
GLUCOSE BLD-MCNC: 188 MG/DL (ref 70–99)
GLUCOSE BLD-MCNC: 245 MG/DL (ref 70–99)
HCT VFR BLD AUTO: 42.8 %
HGB BLD-MCNC: 12.8 G/DL
IMM GRANULOCYTES # BLD AUTO: 0.04 X10(3) UL (ref 0–1)
IMM GRANULOCYTES NFR BLD: 0.4 %
LYMPHOCYTES # BLD AUTO: 0.35 X10(3) UL (ref 1–4)
LYMPHOCYTES NFR BLD AUTO: 3.7 %
MCH RBC QN AUTO: 28.8 PG (ref 26–34)
MCHC RBC AUTO-ENTMCNC: 29.9 G/DL (ref 31–37)
MCV RBC AUTO: 96.2 FL
MONOCYTES # BLD AUTO: 1.3 X10(3) UL (ref 0.1–1)
MONOCYTES NFR BLD AUTO: 13.9 %
NEUTROPHILS # BLD AUTO: 7.66 X10 (3) UL (ref 1.5–7.7)
NEUTROPHILS # BLD AUTO: 7.66 X10(3) UL (ref 1.5–7.7)
NEUTROPHILS NFR BLD AUTO: 81.8 %
PLATELET # BLD AUTO: 193 10(3)UL (ref 150–450)
RBC # BLD AUTO: 4.45 X10(6)UL
WBC # BLD AUTO: 9.4 X10(3) UL (ref 4–11)

## 2021-07-13 PROCEDURE — 93923 UPR/LXTR ART STDY 3+ LVLS: CPT | Performed by: PODIATRIST

## 2021-07-13 PROCEDURE — 99232 SBSQ HOSP IP/OBS MODERATE 35: CPT | Performed by: INTERNAL MEDICINE

## 2021-07-13 RX ORDER — IPRATROPIUM BROMIDE AND ALBUTEROL SULFATE 2.5; .5 MG/3ML; MG/3ML
3 SOLUTION RESPIRATORY (INHALATION)
Status: DISCONTINUED | OUTPATIENT
Start: 2021-07-13 | End: 2021-07-13

## 2021-07-13 RX ORDER — LABETALOL HYDROCHLORIDE 5 MG/ML
10 INJECTION, SOLUTION INTRAVENOUS EVERY 4 HOURS PRN
Status: DISCONTINUED | OUTPATIENT
Start: 2021-07-13 | End: 2021-07-18

## 2021-07-13 RX ORDER — METHYLPREDNISOLONE SODIUM SUCCINATE 125 MG/2ML
80 INJECTION, POWDER, LYOPHILIZED, FOR SOLUTION INTRAMUSCULAR; INTRAVENOUS EVERY 6 HOURS
Status: DISCONTINUED | OUTPATIENT
Start: 2021-07-13 | End: 2021-07-18

## 2021-07-13 RX ORDER — IPRATROPIUM BROMIDE AND ALBUTEROL SULFATE 2.5; .5 MG/3ML; MG/3ML
3 SOLUTION RESPIRATORY (INHALATION)
Status: DISCONTINUED | OUTPATIENT
Start: 2021-07-14 | End: 2021-07-14

## 2021-07-13 NOTE — PLAN OF CARE
Pt is A&O, VSS, with severe c/o pain to abdomen and nose- medication given . Pt is on RA with bilateral diminished/ wheezing lung sounds, . IS at bedside encouraged. Right NG to LIS- brown/bile output, secured at 75 cm. ST on telemetry. NPO- ice chips. Responsible Professional:, RN    - Conduct history & physical assessment within 24 hours of admission  - Assess & evaluate for further medical treatment if indicated  - Responsible Professional: , MD  Outcome: Progressing     Problem: SAFETY ADULT - FALL

## 2021-07-13 NOTE — PROGRESS NOTES
[facility]  Progress Note    Thien Watkins Patient Status:  Inpatient    1955 MRN [id number]   [facility] 3NW-A Attending [facility], [address] Day # 1 PCP Tete Carroll MD     Subjective:   The patient is sitting up in and performed the above history, physical examination, assessment and plan. I have I have edited the above to reflect my evaluation, opinion, and physical exam findings. Exam and plan as above.     Operative findings discussed with patient all questions

## 2021-07-13 NOTE — PROGRESS NOTES
Vss. Pt a&ox3. Pt resting in bedside chair this am. Pt appears comfortably but extremely focused on care he received at [facility]. Pt would like records from that hospital. Assured patient that we will attempt to get records this afternoon.  Pt

## 2021-07-13 NOTE — CONSULTS
[facility]  Report of Inpatient Wound Care Consultation     Carol Yaquelin Patient Status:  Inpatient    1955 MRN [id number]   [facility] 3NW-A Attending Henna Arevalo, [address] [facility] [address] Day # 1 PCP Monalisa Arevalo MD     REASON F --    *  --  213*  --   --   --   --   --    CA 8.7  --  8.2*  --   --   --   --   --    ALB 3.7  --   --   --   --   --   --   --    TP 7.3  --   --   --   --   --   --   --    PGLU  --    < >  --    < > 176*  --  174* 245*    < > = values in this

## 2021-07-13 NOTE — OCCUPATIONAL THERAPY NOTE
OT attempted to see the pt for an eval this am, however pt refusing rehab this am stating that he has been up walking and needs to rest.  Pt educated on the importance of regular mobility, however pt still refusing. Will re-attempt as able and compliant.

## 2021-07-13 NOTE — PHYSICAL THERAPY NOTE
Attempted to see pt for PT eval. Pt politely refusing at this time, reporting \"I just need to sleep. \" pt encouraged and continues to refuse. Risks of immobility discussed with pt.  Pt does report he was able to ambulate in [place] early this am. Will check b

## 2021-07-13 NOTE — PROGRESS NOTES
[facility]  Progress Note     Maura Agent Patient Status:  Inpatient    1955 MRN [id number]   [facility] 3NW-A Attending Kristal Avelar, [address] [facility] Day # 1 PCP Rosemary De Dios MD     Chief Complaint: abdominal pain    S: AST 27  --    ALT 28  --    BILT 0.5  --    TP 7.3  --        Estimated Creatinine Clearance: 84.1 mL/min (based on SCr of 1.09 mg/dL). No results for input(s): PTP, INR in the last 168 hours.          COVID-19 Lab Results    COVID-19  Lab Results   Co montelukast when toleratine PO  7. BPH  1.  Resume tamulosin when toleration PO     Quality:  · DVT Prophylaxis: [address]  · CODE status: full  · Jones: none  · If COVID testing is negative, may discontinue isolation: yes     Will the patient be referred to TCC

## 2021-07-13 NOTE — PROGRESS NOTES
Neb treatment given at this time. BS: diminished with expiratory wheezes. Will continue to monitor.       07/13/21 0333   Respiratory Therapy / Neb Tx   $ RT Standby Charge (per 15 min) 1   $ Initial Tx & Set up ThomGretchen SALDIVAR Order Antonette Garcia

## 2021-07-14 ENCOUNTER — TELEPHONE (OUTPATIENT)
Dept: PODIATRY CLINIC | Facility: CLINIC | Age: 66
End: 2021-07-14

## 2021-07-14 LAB
GLUCOSE BLD-MCNC: 151 MG/DL (ref 70–99)
GLUCOSE BLD-MCNC: 200 MG/DL (ref 70–99)
GLUCOSE BLD-MCNC: 227 MG/DL (ref 70–99)
GLUCOSE BLD-MCNC: 268 MG/DL (ref 70–99)
GLUCOSE BLD-MCNC: 311 MG/DL (ref 70–99)

## 2021-07-14 PROCEDURE — 99232 SBSQ HOSP IP/OBS MODERATE 35: CPT | Performed by: INTERNAL MEDICINE

## 2021-07-14 RX ORDER — DIVALPROEX SODIUM 500 MG/1
1500 TABLET, EXTENDED RELEASE ORAL NIGHTLY
Status: DISCONTINUED | OUTPATIENT
Start: 2021-07-14 | End: 2021-07-18

## 2021-07-14 RX ORDER — IPRATROPIUM BROMIDE AND ALBUTEROL SULFATE 2.5; .5 MG/3ML; MG/3ML
SOLUTION RESPIRATORY (INHALATION)
Status: COMPLETED
Start: 2021-07-14 | End: 2021-07-14

## 2021-07-14 RX ORDER — IPRATROPIUM BROMIDE AND ALBUTEROL SULFATE 2.5; .5 MG/3ML; MG/3ML
3 SOLUTION RESPIRATORY (INHALATION)
Status: DISCONTINUED | OUTPATIENT
Start: 2021-07-14 | End: 2021-07-14

## 2021-07-14 RX ORDER — DIVALPROEX SODIUM 500 MG/1
1500 TABLET, EXTENDED RELEASE ORAL NIGHTLY
Status: CANCELLED | OUTPATIENT
Start: 2021-07-14

## 2021-07-14 RX ORDER — VALSARTAN 320 MG/1
160 TABLET ORAL DAILY
Status: DISCONTINUED | OUTPATIENT
Start: 2021-07-14 | End: 2021-07-15

## 2021-07-14 RX ORDER — TAMSULOSIN HYDROCHLORIDE 0.4 MG/1
0.4 CAPSULE ORAL DAILY
Status: DISCONTINUED | OUTPATIENT
Start: 2021-07-14 | End: 2021-07-18

## 2021-07-14 RX ORDER — RISPERIDONE 0.5 MG/1
1.5 TABLET, FILM COATED ORAL NIGHTLY
Status: CANCELLED | OUTPATIENT
Start: 2021-07-14

## 2021-07-14 RX ORDER — FOLIC ACID 1 MG/1
1 TABLET ORAL 2 TIMES DAILY
Status: DISCONTINUED | OUTPATIENT
Start: 2021-07-14 | End: 2021-07-18

## 2021-07-14 RX ORDER — IPRATROPIUM BROMIDE AND ALBUTEROL SULFATE 2.5; .5 MG/3ML; MG/3ML
3 SOLUTION RESPIRATORY (INHALATION)
Status: DISCONTINUED | OUTPATIENT
Start: 2021-07-14 | End: 2021-07-18

## 2021-07-14 RX ORDER — GABAPENTIN 100 MG/1
100 CAPSULE ORAL NIGHTLY
Status: DISCONTINUED | OUTPATIENT
Start: 2021-07-14 | End: 2021-07-18

## 2021-07-14 RX ORDER — MELATONIN
100
Status: DISCONTINUED | OUTPATIENT
Start: 2021-07-14 | End: 2021-07-18

## 2021-07-14 RX ORDER — ATORVASTATIN CALCIUM 20 MG/1
20 TABLET, FILM COATED ORAL NIGHTLY
Status: DISCONTINUED | OUTPATIENT
Start: 2021-07-14 | End: 2021-07-18

## 2021-07-14 RX ORDER — MONTELUKAST SODIUM 10 MG/1
10 TABLET ORAL NIGHTLY
Status: DISCONTINUED | OUTPATIENT
Start: 2021-07-14 | End: 2021-07-18

## 2021-07-14 RX ORDER — RISPERIDONE 0.5 MG/1
1.5 TABLET, FILM COATED ORAL NIGHTLY
Status: DISCONTINUED | OUTPATIENT
Start: 2021-07-14 | End: 2021-07-18

## 2021-07-14 RX ORDER — IPRATROPIUM BROMIDE AND ALBUTEROL SULFATE 2.5; .5 MG/3ML; MG/3ML
3 SOLUTION RESPIRATORY (INHALATION) EVERY 4 HOURS PRN
Status: DISCONTINUED | OUTPATIENT
Start: 2021-07-14 | End: 2021-07-18

## 2021-07-14 RX ORDER — TRAZODONE HYDROCHLORIDE 50 MG/1
50 TABLET ORAL NIGHTLY
Status: DISCONTINUED | OUTPATIENT
Start: 2021-07-14 | End: 2021-07-18

## 2021-07-14 NOTE — PHYSICAL THERAPY NOTE
Attempted to see patient for PT evaluation this AM.  Patient currently meeting with clinical  regarding concerns for care and wanting to discharge from hospital.  Will reattempt PT services as able.   Reattempted in PM and patient just received l

## 2021-07-14 NOTE — PROGRESS NOTES
[facility]  Progress Note    Jeff Ramirez Patient Status:  Inpatient    1955 MRN [id number]   [facility] 3NW-A Attending Fabrice Hager MD   [facility] Day # 2 PCP Eugenio Andres MD     Subjective:   The patient was seen up ambulati Agitation     Viral URI     Respiratory distress     Adhesive capsulitis of right shoulder     Alcohol withdrawal syndrome, with delirium (HCC)     Benign essential HTN     Cocaine abuse ([facility] [address]     Acute encephalopathy     Diabetes mellitus (HCC)     Mild and plan. I have I have edited the above to reflect my evaluation, opinion, and physical exam findings. Exam and plan as above. Patient's NG tube accidentally withdrawn overnight. Patient is declining new NG tube now.   Patient wishes to have low re

## 2021-07-14 NOTE — OCCUPATIONAL THERAPY NOTE
Attempted to see patient for OT evaluation this AM.  Patient currently meeting with clinical  regarding concerns for care and wanting to discharge from hospital.  Will reattempt OT services as able.

## 2021-07-14 NOTE — DIETARY NOTE
[facility] [address]     Admitting diagnosis:  Small bowel obstruction ([facility] [address] [J96.219]    Ht: 193 cm (6' 4\")  Wt: 102.1 kg (225 lb). Body mass index is 27.39 kg/m². Wt Readings from Last 6 Encounters:  07/11/21 : 102.

## 2021-07-14 NOTE — PROGRESS NOTES
[facility]  Progress Note     Thien Roland Patient Status:  Inpatient    1955 MRN [id number]   [facility] 3NW-A Attending [facility], [address] [facility] Day # 2 PCP Tete Carroll MD     Chief Complaint: abdominal pain    S: (based on SCr of 1.09 mg/dL). No results for input(s): PTP, INR in the last 168 hours.          COVID-19 Lab Results    COVID-19  Lab Results   Component Value Date    COVID19 Not Detected 07/11/2021       Pro-Calcitonin  No results for input(s): PCT in is negative, may discontinue isolation: yes     Will the patient be referred to TCC on discharge?: yes  Estimated date of discharge: tbd  Discharge is dependent on: clinical state, surgery recs  At this point Mr. Cardoza is expected to be discharge to: home

## 2021-07-14 NOTE — PLAN OF CARE
Pt is angery and  irritable. Pt asking for ns flush to flush his own iv after pian meds, pt upset that Rn refused stating that what other nurses have being doing. Pt has been c/o complaining that we are not giving food for days.  Abd is distended, bs hypo, assist with strengthening/mobility  - Encourage toileting schedule  - Responsible Professional:, RN    - Evaluate and order additional treatment as indicated  - Responsible Professional:, MD  Outcome: Progressing

## 2021-07-14 NOTE — OCCUPATIONAL THERAPY NOTE
Reattempted to see pt for Occupational Therapy this PM and patient just received lunch tray and insistent on eating prior to any activity. Will reattempt OT services as able.

## 2021-07-15 ENCOUNTER — ANESTHESIA EVENT (OUTPATIENT)
Dept: SURGERY | Facility: HOSPITAL | Age: 66
DRG: 329 | End: 2021-07-15
Payer: MEDICARE

## 2021-07-15 ENCOUNTER — APPOINTMENT (OUTPATIENT)
Dept: GENERAL RADIOLOGY | Facility: HOSPITAL | Age: 66
DRG: 329 | End: 2021-07-15
Attending: ORTHOPAEDIC SURGERY
Payer: MEDICARE

## 2021-07-15 LAB
GLUCOSE BLD-MCNC: 207 MG/DL (ref 70–99)
GLUCOSE BLD-MCNC: 246 MG/DL (ref 70–99)
GLUCOSE BLD-MCNC: 265 MG/DL (ref 70–99)
GLUCOSE BLD-MCNC: 269 MG/DL (ref 70–99)

## 2021-07-15 PROCEDURE — 99232 SBSQ HOSP IP/OBS MODERATE 35: CPT | Performed by: INTERNAL MEDICINE

## 2021-07-15 PROCEDURE — 99222 1ST HOSP IP/OBS MODERATE 55: CPT | Performed by: ORTHOPAEDIC SURGERY

## 2021-07-15 PROCEDURE — 73030 X-RAY EXAM OF SHOULDER: CPT | Performed by: ORTHOPAEDIC SURGERY

## 2021-07-15 RX ORDER — VALSARTAN 320 MG/1
160 TABLET ORAL ONCE
Status: COMPLETED | OUTPATIENT
Start: 2021-07-15 | End: 2021-07-15

## 2021-07-15 RX ORDER — VALSARTAN 320 MG/1
320 TABLET ORAL DAILY
Status: DISCONTINUED | OUTPATIENT
Start: 2021-07-16 | End: 2021-07-18

## 2021-07-15 NOTE — PLAN OF CARE
A&O x4. Denies any CP, LEVI, or calf pain at present. Lungs clear with expiratory wheezes - scheduled nebs. ST on tele. Abdomen soft, distended, tender. Bowel sounds hypoactive, pt reports passing gas and having BMs but none noted by writing RN.  Incision UT for physical needs  - Identify cognitive and physical deficits and behaviors that affect risk of falls  - [place] fall precautions as indicated by assessment  - Educate patient/family on patient safety including physical imitations  - Instruct patient to

## 2021-07-15 NOTE — OCCUPATIONAL THERAPY NOTE
OCCUPATIONAL THERAPY EVALUATION - INPATIENT     Room Number: 306/306-A  Evaluation Date: 7/15/2021  Type of Evaluation: Initial  Presenting Problem: small bowel obstruction    Physician Order: IP Consult to Occupational Therapy  Reason for Therapy: ADL/IAD functional limits    VISION  Current Vision: no visual deficits    PERCEPTION  Overall Perception Status:   WFL - within functional limits    SENSATION  Light touch:  intact    Communication: Pt verbalize needs    Behavioral/Emotional/Social: pt cooperativ chair;Needs met;Call light within reach;RN aware of session/findings; All patient questions and concerns addressed    ASSESSMENT     Patient is a 61year old male admitted on 7/11/2021 for sbo. Complete medical history and occupational profile noted above.

## 2021-07-15 NOTE — PHYSICAL THERAPY NOTE
PHYSICAL THERAPY QUICK EVALUATION - INPATIENT    Room Number: 306/306-A  Evaluation Date: 7/15/2021  Presenting Problem: s/p exp lap, ZEINA 7/12/21  Physician Order: PT Eval and Treat    Problem List  Principal Problem:    Small bowel obstruction ([address]  Ac MOBILITY  How much difficulty does the patient currently have. ..  -   Turning over in bed (including adjusting bedclothes, sheets and blankets)?: None   -   Sitting down on and standing up from a chair with arms (e.g., wheelchair, bedside commode, etc.): N with no skilled Physical Therapy needs at this time. Patient discharged from Physical Therapy services. Please re-order if a new functional limitation presents during this admission. GOALS  Patient was able to achieve the following goals . ..     Logan

## 2021-07-15 NOTE — CONSULTS
[facility] Consult Note    CC: right shoulder pain    HPI: This 61year old male admitted for small bowel obstruction secondary to adhesions and contaminated abdominal wall mesh s/p Gen Surg small bowel obstruction, also with chronic osteomyelitis of R mary lou Alcohol use: Yes        Comment: Pt stated only one time birthday      Drug use: No      Sexual activity: Not on file       ROS:  Comprehensive 10 system review obtained and negative except as mentioned above      Physical Exam:    /90 (BP Location:

## 2021-07-15 NOTE — PROGRESS NOTES
[facility]  Progress Note     Syed Love Patient Status:  Inpatient    1955 MRN [id number]   [facility] 3NW-A Attending Aure Heart, DO   Hosp Day # 3 PCP Latonia Sommers MD     Chief Complaint: abdominal pain    S: results for input(s): PTP, INR in the last 168 hours. COVID-19 Lab Results    COVID-19  Lab Results   Component Value Date    COVID19 Not Detected 07/11/2021       Pro-Calcitonin  No results for input(s): PCT in the last 168 hours.     Cardiac  No r with IVF  4. HTN  1. Resume home meds, increase dose 7/15  5. DM2 with hyperglycemia   1. HgbA1c 5.9%  2. BS in 200s, will start reduced dose of levemir tonight 4u (PTA taking 12u)  3. SSI  6. Asthma  1. Continue inhalers  2. Resume montelukas  7. BPH  1.

## 2021-07-15 NOTE — PROGRESS NOTES
Patient was seen at this time concerning his right great toe. I had heard that he wanted to try antibiotics but he does have erosive changes with black gangrenous changes to the tip of the toe.   Erosive changes are easily noted on x-ray indicating osteomy

## 2021-07-15 NOTE — PLAN OF CARE
07/15/2021 0200: Pt Aox3-4; mood appears to be inconsistent as patient frequently passes between agreeable to agitated and angry using a notable loud tone; MD hospitalist aware. Pt abdomen very rounded/distended.  Pt reports he is passing gas and when asked diabetes  Outcome: Progressing     Problem: Patient/Family Goals  Goal: Patient/Family Long Term Goal  Description: Patient's Long Term Goal: GO BACK HOME; [address]    Interventions:  - PAIN MANAGEMENT  - BOWEL FUNCTION RETURN  - See additional Car

## 2021-07-15 NOTE — ANESTHESIA PREPROCEDURE EVALUATION
PRE-OP EVALUATION    Patient Name: Venora Sicard    Admit Diagnosis: Small bowel obstruction ([address] [Q78.448]    Pre-op Diagnosis: inpt    PARTIAL RIGHT GREAT TOE AMPUTATION    Anesthesia Procedure: PARTIAL RIGHT GREAT TOE AMPUTATION (Right )    Surgeon(s) 10 mg, Intravenous, Q4H PRN  0.9% NaCl infusion, , Intravenous, Continuous  HYDROmorphone HCl (DILAUDID) 1 MG/ML injection 0.2 mg, 0.2 mg, Intravenous, Q2H PRN   Or  HYDROmorphone HCl (DILAUDID) 1 MG/ML injection 0.4 mg, 0.4 mg, Intravenous, Q2H PRN   Or (OFIRMEV) infusion 1,000 mg, 1,000 mg, Intravenous, Q6H PRN  phenol (CHLORASEPTIC) 1.4 % oral liquid spray, , Oral, Q2H PRN  [COMPLETED] Gadoterate Meglumine (DOTAREM) 10 MMOL/20ML injection 20 mL, 20 mL, Intravenous, ONCE PRN  [COMPLETED] ipratropium-albu mouth daily. , Disp: 30 tablet, Rfl: 0, Past Month at Unknown time  Pantoprazole Sodium 40 MG Oral Tab EC, Take 1 tablet (40 mg total) by mouth every morning before breakfast., Disp: 30 tablet, Rfl: 0, 7/11/2021 at Unknown time  Thiamine HCl 100 MG Oral Tab diabetes (Pt will not elaborate on how well controlled he is at home)  type 2, using insulin                         Pulmonary      (+) asthma                     Neuro/Psych                 (+) neuromuscular disease                   Past Surgical History

## 2021-07-15 NOTE — PROGRESS NOTES
[facility]  Progress Note    Ashley Pierce Patient Status:  Inpatient    1955 MRN [id number]   [facility] 3NW-A Attending Ishmael Davis MD   [facility] Day # 3 PCP Nicolas Diaz MD     Subjective:   The patient states that he is do abuse ([facility] [address]     Acute encephalopathy     Diabetes mellitus ([facility] [address]     Mild intermittent asthma with acute exacerbation     Syncope, near     Confusion     Delirium     Polysubstance dependence ([facility] [address]     Paranoid ideation (HCC)     Chronic right shoulder melyssa General Surgery

## 2021-07-16 ENCOUNTER — ANESTHESIA (OUTPATIENT)
Dept: SURGERY | Facility: HOSPITAL | Age: 66
DRG: 329 | End: 2021-07-16
Payer: MEDICARE

## 2021-07-16 ENCOUNTER — APPOINTMENT (OUTPATIENT)
Dept: GENERAL RADIOLOGY | Facility: HOSPITAL | Age: 66
DRG: 329 | End: 2021-07-16
Attending: PODIATRIST
Payer: MEDICARE

## 2021-07-16 LAB
GLUCOSE BLD-MCNC: 204 MG/DL (ref 70–99)
GLUCOSE BLD-MCNC: 238 MG/DL (ref 70–99)
GLUCOSE BLD-MCNC: 249 MG/DL (ref 70–99)
GLUCOSE BLD-MCNC: 297 MG/DL (ref 70–99)

## 2021-07-16 PROCEDURE — 0Y6P0Z3 DETACHMENT AT RIGHT 1ST TOE, LOW, OPEN APPROACH: ICD-10-PCS | Performed by: PODIATRIST

## 2021-07-16 PROCEDURE — 28825 PARTIAL AMPUTATION OF TOE: CPT | Performed by: PODIATRIST

## 2021-07-16 PROCEDURE — 76000 FLUOROSCOPY <1 HR PHYS/QHP: CPT | Performed by: PODIATRIST

## 2021-07-16 PROCEDURE — 99232 SBSQ HOSP IP/OBS MODERATE 35: CPT | Performed by: INTERNAL MEDICINE

## 2021-07-16 RX ORDER — INSULIN ASPART 100 [IU]/ML
INJECTION, SOLUTION INTRAVENOUS; SUBCUTANEOUS ONCE
Status: COMPLETED | OUTPATIENT
Start: 2021-07-16 | End: 2021-07-16

## 2021-07-16 RX ORDER — INSULIN ASPART 100 [IU]/ML
INJECTION, SOLUTION INTRAVENOUS; SUBCUTANEOUS
Status: COMPLETED
Start: 2021-07-16 | End: 2021-07-16

## 2021-07-16 RX ORDER — DEXTROSE MONOHYDRATE 25 G/50ML
50 INJECTION, SOLUTION INTRAVENOUS
Status: DISCONTINUED | OUTPATIENT
Start: 2021-07-16 | End: 2021-07-16 | Stop reason: HOSPADM

## 2021-07-16 RX ORDER — METOCLOPRAMIDE HYDROCHLORIDE 5 MG/ML
10 INJECTION INTRAMUSCULAR; INTRAVENOUS AS NEEDED
Status: DISCONTINUED | OUTPATIENT
Start: 2021-07-16 | End: 2021-07-16 | Stop reason: HOSPADM

## 2021-07-16 RX ORDER — NALOXONE HYDROCHLORIDE 0.4 MG/ML
80 INJECTION, SOLUTION INTRAMUSCULAR; INTRAVENOUS; SUBCUTANEOUS AS NEEDED
Status: DISCONTINUED | OUTPATIENT
Start: 2021-07-16 | End: 2021-07-16 | Stop reason: HOSPADM

## 2021-07-16 RX ORDER — DEXAMETHASONE SODIUM PHOSPHATE 4 MG/ML
VIAL (ML) INJECTION AS NEEDED
Status: DISCONTINUED | OUTPATIENT
Start: 2021-07-16 | End: 2021-07-16 | Stop reason: SURG

## 2021-07-16 RX ORDER — IBUPROFEN 600 MG/1
600 TABLET ORAL ONCE AS NEEDED
Status: DISCONTINUED | OUTPATIENT
Start: 2021-07-16 | End: 2021-07-16 | Stop reason: HOSPADM

## 2021-07-16 RX ORDER — ONDANSETRON 2 MG/ML
4 INJECTION INTRAMUSCULAR; INTRAVENOUS AS NEEDED
Status: DISCONTINUED | OUTPATIENT
Start: 2021-07-16 | End: 2021-07-16 | Stop reason: HOSPADM

## 2021-07-16 RX ORDER — MIDAZOLAM HYDROCHLORIDE 1 MG/ML
INJECTION INTRAMUSCULAR; INTRAVENOUS AS NEEDED
Status: DISCONTINUED | OUTPATIENT
Start: 2021-07-16 | End: 2021-07-16 | Stop reason: SURG

## 2021-07-16 RX ORDER — BUPIVACAINE HYDROCHLORIDE 5 MG/ML
INJECTION, SOLUTION EPIDURAL; INTRACAUDAL AS NEEDED
Status: DISCONTINUED | OUTPATIENT
Start: 2021-07-16 | End: 2021-07-16 | Stop reason: HOSPADM

## 2021-07-16 RX ORDER — SODIUM CHLORIDE, SODIUM LACTATE, POTASSIUM CHLORIDE, CALCIUM CHLORIDE 600; 310; 30; 20 MG/100ML; MG/100ML; MG/100ML; MG/100ML
INJECTION, SOLUTION INTRAVENOUS CONTINUOUS
Status: DISCONTINUED | OUTPATIENT
Start: 2021-07-16 | End: 2021-07-16 | Stop reason: HOSPADM

## 2021-07-16 RX ORDER — LABETALOL HYDROCHLORIDE 5 MG/ML
10 INJECTION, SOLUTION INTRAVENOUS ONCE
Status: COMPLETED | OUTPATIENT
Start: 2021-07-16 | End: 2021-07-16

## 2021-07-16 RX ORDER — HYDROMORPHONE HYDROCHLORIDE 1 MG/ML
0.4 INJECTION, SOLUTION INTRAMUSCULAR; INTRAVENOUS; SUBCUTANEOUS EVERY 5 MIN PRN
Status: DISCONTINUED | OUTPATIENT
Start: 2021-07-16 | End: 2021-07-16 | Stop reason: HOSPADM

## 2021-07-16 RX ADMIN — SODIUM CHLORIDE: 9 INJECTION, SOLUTION INTRAVENOUS at 11:37:00

## 2021-07-16 RX ADMIN — DEXAMETHASONE SODIUM PHOSPHATE 4 MG: 4 MG/ML VIAL (ML) INJECTION at 10:56:00

## 2021-07-16 RX ADMIN — MIDAZOLAM HYDROCHLORIDE 2 MG: 1 INJECTION INTRAMUSCULAR; INTRAVENOUS at 10:46:00

## 2021-07-16 RX ADMIN — ONDANSETRON 4 MG: 2 INJECTION INTRAMUSCULAR; INTRAVENOUS at 10:56:00

## 2021-07-16 RX ADMIN — SODIUM CHLORIDE: 9 INJECTION, SOLUTION INTRAVENOUS at 10:56:00

## 2021-07-16 NOTE — ANESTHESIA PROCEDURE NOTES
Airway  Date/Time: 7/16/2021 10:55 AM  Urgency: elective      General Information and Staff    Patient location during procedure: OR  Anesthesiologist: Mae Montiel MD  Performed: anesthesiologist     Indications and Patient Condition  Indications for air

## 2021-07-16 NOTE — BRIEF OP NOTE
Pre-Operative Diagnosis: osteomyelitis distal phalanx right hallux     Post-Operative Diagnosis: osteomyelitis distal phalanx right hallux      Procedure Performed:   PARTIAL RIGHT GREAT TOE AMPUTATION    Surgeon(s) and Role:     * [place]orlag, Bascom Boas, DPM

## 2021-07-16 NOTE — PLAN OF CARE
Pt aox4; irritable but cooperative this shift. Pt abdomen rounded/distended. Pt reports gas and BM but both unwitnessed by RN; pt asked to not flush next BM so medical staff can assess.  Pt complains of pain in his abdomen; managing with IV pain medications MANAGEMENT  - BOWEL FUNCTION RETURN  - See additional Care Plan goals for specific interventions  Outcome: Progressing  Goal: Patient/Family Short Term Goal  Description: Patient's Short Term Goal: BOWEL FUNCTION RETURN    Interventions:   - AMBULATE   - P

## 2021-07-16 NOTE — PLAN OF CARE
Patient A/O X 4, VSS, ST on tele, low 100's. . Denies pain. Ambulating in hallway. Going for surgery at 955 am. Asking for ice this am. Bed not in lowest position, patient refusing to be lowered.      1400: Patient received back from surgery in stable condi Provide assistive devices as appropriate  - Consider occupational and/or physical therapy consult to assist with strengthening/mobility  - Encourage toileting schedule  - Responsible Professional: , RN    - Evaluate and order additional treatment as indica

## 2021-07-16 NOTE — PROGRESS NOTES
[facility]  Progress Note    Tom Zuniga Patient Status:  Inpatient    1955 MRN [id number]   [facility] 3NW-A Attending Eda Cortez MD   [facility] Day # 4 PCP Konrad Terrell MD     Subjective:   The patient states that he is do Agitation     Viral URI     Respiratory distress     Adhesive capsulitis of right shoulder     Alcohol withdrawal syndrome, with delirium (HCC)     Benign essential HTN     Cocaine abuse ([facility] [address]     Acute encephalopathy     Diabetes mellitus ([facility] [address]     Mild i Surgery 0 = understands/communicates without difficulty

## 2021-07-16 NOTE — OPERATIVE REPORT
[facility]     OPERATIVE REPORT    Lexie Magaña    CSN [id number] MRN [id number]    1955 Age 61year old   Admission Date 2021 Operation Date 2021   Attending Physician Rich Dempsey MD Operating Physician Louie James DPM phalanx were made. Incision was deepened down to level of bone the wound edges were underscored and retracted proximally. The interphalangeal joint was incised utilizing a 15 blade.   The collateral ligaments were severed the plantar capsular structures a

## 2021-07-16 NOTE — PROGRESS NOTES
[facility]  Progress Note     Jaida Camargo Patient Status:  Inpatient    1955 MRN [id number]   [facility] 3NW-A Attending Cynthia Simpson, DO   Hosp Day # 4 PCP Ap Posadas MD     Chief Complaint: abdominal pain    S: mg/dL). No results for input(s): PTP, INR in the last 168 hours. COVID-19 Lab Results    COVID-19  Lab Results   Component Value Date    COVID19 Not Detected 07/11/2021       Pro-Calcitonin  No results for input(s): PCT in the last 168 hours. improved with IVF  4. HTN  1. Resume home meds, increase dose 7/15  5. DM2 with hyperglycemia   1. HgbA1c 5.9%  2. BS in 200s, will start reduced dose of levemir tonight 4u (PTA taking 12u)  3. SSI  6. Asthma  1. Continue inhalers  2. Resume montelukas  7.

## 2021-07-16 NOTE — ANESTHESIA POSTPROCEDURE EVALUATION
[id number] [facility] Patient Status:  Inpatient   Age/Gender 61year old male MRN [id number]   [facility] SURGERY Attending Piedad Dela Cruz MD   [facility] Day # 4 PCP Ben Crowder MD       Anesthesia Post-op Note    PARTIAL RIGHT

## 2021-07-17 LAB
GLUCOSE BLD-MCNC: 244 MG/DL (ref 70–99)
GLUCOSE BLD-MCNC: 245 MG/DL (ref 70–99)
GLUCOSE BLD-MCNC: 258 MG/DL (ref 70–99)
GLUCOSE BLD-MCNC: 312 MG/DL (ref 70–99)

## 2021-07-17 PROCEDURE — 99232 SBSQ HOSP IP/OBS MODERATE 35: CPT | Performed by: INTERNAL MEDICINE

## 2021-07-17 RX ORDER — PREDNISONE 20 MG/1
TABLET ORAL
Qty: 6 TABLET | Refills: 0 | Status: ON HOLD | OUTPATIENT
Start: 2021-07-17 | End: 2021-07-31

## 2021-07-17 RX ORDER — NALOXONE HYDROCHLORIDE 4 MG/.1ML
4 SPRAY, METERED NASAL AS NEEDED
Qty: 1 KIT | Refills: 0 | Status: SHIPPED | OUTPATIENT
Start: 2021-07-17

## 2021-07-17 RX ORDER — OXYCODONE HYDROCHLORIDE 5 MG/1
5 TABLET ORAL EVERY 4 HOURS PRN
Qty: 60 TABLET | Refills: 0 | Status: SHIPPED | OUTPATIENT
Start: 2021-07-17

## 2021-07-17 NOTE — PLAN OF CARE
Patient A/O X 4, BP elevated, PRN hydralazine given. Denies pain at this time, being medicated with dilaudid as needed. Bed not in lowest position, patient refuses to let staff lower it. Am medications thrown in garbage by patient, refuses to take.  Foam ta physical assessment within 24 hours of admission  - Assess & evaluate for further medical treatment if indicated  - Responsible Professional: MD  Outcome: Progressing     Problem: SAFETY ADULT - FALL  Goal: Free from fall injury  Description: INTERVENTIONS

## 2021-07-17 NOTE — PROGRESS NOTES
07/17/21  0742   BP: (!) 174/69   Pulse: 105   Resp: 20   Temp: 98.2 °F (36.8 °C)   Patient was seen resting comfortably in bed he does have some pain in his toe. Controlled by current medications. Objective:  There is slight bloody strikethrough on th

## 2021-07-17 NOTE — PROGRESS NOTES
[facility]  Progress Note    Maura Agent Patient Status:  Inpatient    1955 MRN [id number]   [facility] 3NW-A Attending Nava Cazares MD   [facility] Day # 5 PCP Rosemary De Dios MD     Subjective:   The patient is resting comfortab Cervical radiculitis     Avascular necrosis of humeral head, left (HCC)     Peripheral vascular disease (HCC)     Ulcerated, foot, right, limited to breakdown of skin (HCC)     Hyponatremia     Small bowel obstruction (HCC)     [facility] ([facility]

## 2021-07-17 NOTE — PLAN OF CARE
Pt a&ox4, VSS, afebrile. ST on telemetry - low 100's. Intermittently requires 3L NC to maintain O2 sats. Pt refusing abdominal binder. Dressing to abdomen over incision. Pt bed raised, refusing to let staff lower bed.  This RN explained to pt that its a fal PASS GAS, HAVE BM  - See additional Care Plan goals for specific interventions  Outcome: Progressing     Problem: Pre-existing medical condition  Goal: Maintenance of stable medical condition  Description: INTERVENTIONS:  - Administer medications and treat

## 2021-07-17 NOTE — PROGRESS NOTES
[facility]  Progress Note     John Posada Patient Status:  Inpatient    1955 MRN [id number]   [facility] 3NW-A Attending Carlo Lamar DO   Hosp Day # 5 PCP Vidhya Tapia MD     Chief Complaint: abdominal pain    S: on SCr of 1.09 mg/dL). No results for input(s): PTP, INR in the last 168 hours.          COVID-19 Lab Results    COVID-19  Lab Results   Component Value Date    COVID19 Not Detected 07/11/2021       Pro-Calcitonin  No results for input(s): PCT in the las increase dose 7/15  5. DM2 with hyperglycemia   1. HgbA1c 5.9%  2. BS in 200s, will start reduced dose of levemir tonight 4u (PTA taking 12u)  3. SSI  6. Asthma  1. Continue inhalers  2. Resume montelukas  7. R shoulder pain  1.  Follows with ortho as outpa

## 2021-07-18 ENCOUNTER — ANESTHESIA EVENT (OUTPATIENT)
Dept: MEDSURG UNIT | Facility: HOSPITAL | Age: 66
DRG: 329 | End: 2021-07-18
Payer: MEDICARE

## 2021-07-18 ENCOUNTER — APPOINTMENT (OUTPATIENT)
Dept: GENERAL RADIOLOGY | Facility: HOSPITAL | Age: 66
DRG: 329 | End: 2021-07-18
Attending: SURGERY
Payer: MEDICARE

## 2021-07-18 ENCOUNTER — ANESTHESIA (OUTPATIENT)
Dept: MEDSURG UNIT | Facility: HOSPITAL | Age: 66
DRG: 329 | End: 2021-07-18
Payer: MEDICARE

## 2021-07-18 VITALS
HEIGHT: 76 IN | HEART RATE: 102 BPM | OXYGEN SATURATION: 95 % | SYSTOLIC BLOOD PRESSURE: 161 MMHG | DIASTOLIC BLOOD PRESSURE: 82 MMHG | BODY MASS INDEX: 27.4 KG/M2 | WEIGHT: 225 LBS | RESPIRATION RATE: 18 BRPM | TEMPERATURE: 98 F

## 2021-07-18 LAB
GLUCOSE BLD-MCNC: 232 MG/DL (ref 70–99)
GLUCOSE BLD-MCNC: 295 MG/DL (ref 70–99)

## 2021-07-18 PROCEDURE — 99239 HOSP IP/OBS DSCHRG MGMT >30: CPT | Performed by: INTERNAL MEDICINE

## 2021-07-18 PROCEDURE — 71045 X-RAY EXAM CHEST 1 VIEW: CPT | Performed by: SURGERY

## 2021-07-18 RX ORDER — METHYLPREDNISOLONE SODIUM SUCCINATE 40 MG/ML
40 INJECTION, POWDER, LYOPHILIZED, FOR SOLUTION INTRAMUSCULAR; INTRAVENOUS EVERY 12 HOURS
Status: DISCONTINUED | OUTPATIENT
Start: 2021-07-18 | End: 2021-07-18

## 2021-07-18 NOTE — ANESTHESIA PROCEDURE NOTES
Peripheral IV  Date/Time: 7/18/2021 1:15 AM  Inserted by: Niko Ring MD    Placement  Needle size: 25 G  Laterality: left  Location: hand  Local anesthetic: none  Site prep: alcohol  Technique: anatomical landmarks  Attempts: 1

## 2021-07-18 NOTE — PROGRESS NOTES
[facility]  Progress Note    Irma Chacon Patient Status:  Inpatient    1955 MRN [id number]   [facility] 3NW-A Attending Piedad Dela Cruz MD   [facility] Day # 6 PCP Ben Crowder MD     Subjective:  Patient states he is feeling bet against medical advice as the patient still has an NG tube in place. The patient states that he will not sign any paperwork but he does wish to leave the hospital now.   I spoke to his RN and told her that patient wanted to leave   Janet Martinez MD

## 2021-07-18 NOTE — PROGRESS NOTES
[facility]  Progress Note     Dane Feeling Patient Status:  Inpatient    1955 MRN [id number]   [facility] 3NW-A Attending Verónica Rai, DO   Hosp Day # 6 PCP Antony Holm MD     Chief Complaint: abdominal pain    S: input(s): PTP, INR in the last 168 hours. COVID-19 Lab Results    COVID-19  Lab Results   Component Value Date    COVID19 Not Detected 07/11/2021       Pro-Calcitonin  No results for input(s): PCT in the last 168 hours.     Cardiac  No results for i 1. HgbA1c 5.9%  2. SSI  6. Asthma  1. Continue inhalers  2. Resume montelukast  7. R shoulder pain  1. Follows with ortho as outpatient   2. Pain control  8. BPH  1.  Resume tamulosin     Quality:  · DVT Prophylaxis: [address]  · CODE status: full  · Jones: non

## 2021-07-18 NOTE — PLAN OF CARE
Pt a&ox4, afebrile. BP elevated, prn meds administered. Pt reporting pain intermittently, dilaudid administered prn. Pt intermittently agreeable to plan of care. Reports passing gas. Abdomen rounded, bowel sounds hypoactive.  Pt reporting intermittent nause INTERVENTIONS:  - Administer medications and treatments as ordered  - Monitor for and notify internist of changes as needed  - Assess vital signs per unit routine  and as needed  - Responsible Professional: , RN    - Conduct history & physical assessment w

## 2021-07-18 NOTE — PLAN OF CARE
Attempted to reviewed discharge instructions. Patient kept telling me to \"shut up\" and quit irritating me. Continued to reviewed instructions and risks of leaving. Patient refused to sign AMA paperwork. AMA paperwork signed with nurse anita ASTUDILLO on SCU.

## 2021-07-18 NOTE — PLAN OF CARE
Iv solumedrol, elevated glucose  NG draining  Refusing scds, refusing tele  Encouraged to be NWB to right foot. Patient refuses. Ambulating to bathroom  Patient angry, yelling, and anxious at times.    IV dilaudid for pain      1400:  Pt states he wants to

## 2021-07-19 ENCOUNTER — APPOINTMENT (OUTPATIENT)
Dept: GENERAL RADIOLOGY | Facility: HOSPITAL | Age: 66
DRG: 335 | End: 2021-07-19
Attending: EMERGENCY MEDICINE
Payer: MEDICARE

## 2021-07-19 ENCOUNTER — HOSPITAL ENCOUNTER (INPATIENT)
Facility: HOSPITAL | Age: 66
LOS: 14 days | Discharge: LEFT BEFORE DISCHARGE | DRG: 335 | End: 2021-08-02
Attending: EMERGENCY MEDICINE | Admitting: INTERNAL MEDICINE
Payer: MEDICARE

## 2021-07-19 DIAGNOSIS — K56.609 SMALL BOWEL OBSTRUCTION (HCC): Primary | ICD-10-CM

## 2021-07-19 DIAGNOSIS — K56.609 BOWEL OBSTRUCTION (HCC): ICD-10-CM

## 2021-07-19 PROBLEM — R79.89 AZOTEMIA: Status: ACTIVE | Noted: 2021-07-19

## 2021-07-19 PROBLEM — N17.9 ACUTE KIDNEY INJURY (HCC): Status: ACTIVE | Noted: 2021-07-19

## 2021-07-19 PROBLEM — D72.829 LEUKOCYTOSIS: Status: ACTIVE | Noted: 2021-07-19

## 2021-07-19 LAB
ALBUMIN SERPL-MCNC: 3 G/DL (ref 3.4–5)
ALBUMIN/GLOB SERPL: 0.7 {RATIO} (ref 1–2)
ALP LIVER SERPL-CCNC: 84 U/L
ALT SERPL-CCNC: 24 U/L
ANION GAP SERPL CALC-SCNC: 4 MMOL/L (ref 0–18)
AST SERPL-CCNC: 20 U/L (ref 15–37)
BASOPHILS # BLD: 0 X10(3) UL (ref 0–0.2)
BASOPHILS NFR BLD: 0 %
BILIRUB SERPL-MCNC: 0.8 MG/DL (ref 0.1–2)
BUN BLD-MCNC: 54 MG/DL (ref 7–18)
BUN/CREAT SERPL: 40.3 (ref 10–20)
CALCIUM BLD-MCNC: 9 MG/DL (ref 8.5–10.1)
CHLORIDE SERPL-SCNC: 92 MMOL/L (ref 98–112)
CO2 SERPL-SCNC: 35 MMOL/L (ref 21–32)
CREAT BLD-MCNC: 1.34 MG/DL
DEPRECATED RDW RBC AUTO: 41.8 FL (ref 35.1–46.3)
EOSINOPHIL # BLD: 0.23 X10(3) UL (ref 0–0.7)
EOSINOPHIL NFR BLD: 1 %
ERYTHROCYTE [DISTWIDTH] IN BLOOD BY AUTOMATED COUNT: 12.7 % (ref 11–15)
GLOBULIN PLAS-MCNC: 4.3 G/DL (ref 2.8–4.4)
GLUCOSE BLD-MCNC: 105 MG/DL (ref 70–99)
GLUCOSE BLD-MCNC: 125 MG/DL (ref 70–99)
GLUCOSE BLD-MCNC: 130 MG/DL (ref 70–99)
GLUCOSE BLD-MCNC: 137 MG/DL (ref 70–99)
HCT VFR BLD AUTO: 42.9 %
HGB BLD-MCNC: 13.9 G/DL
LIPASE SERPL-CCNC: 144 U/L (ref 73–393)
LYMPHOCYTES NFR BLD: 11 %
LYMPHOCYTES NFR BLD: 2.54 X10(3) UL (ref 1–4)
M PROTEIN MFR SERPL ELPH: 7.3 G/DL (ref 6.4–8.2)
MCH RBC QN AUTO: 28.9 PG (ref 26–34)
MCHC RBC AUTO-ENTMCNC: 32.4 G/DL (ref 31–37)
MCV RBC AUTO: 89.2 FL
MONOCYTES # BLD: 0.92 X10(3) UL (ref 0.1–1)
MONOCYTES NFR BLD: 4 %
MORPHOLOGY: NORMAL
NEUTROPHILS # BLD AUTO: 19.83 X10 (3) UL (ref 1.5–7.7)
NEUTROPHILS NFR BLD: 70 %
NEUTS BAND NFR BLD: 14 %
NEUTS HYPERSEG # BLD: 19.4 X10(3) UL (ref 1.5–7.7)
OSMOLALITY SERPL CALC.SUM OF ELEC: 287 MOSM/KG (ref 275–295)
PLATELET # BLD AUTO: 406 10(3)UL (ref 150–450)
PLATELET MORPHOLOGY: NORMAL
POTASSIUM SERPL-SCNC: 3.9 MMOL/L (ref 3.5–5.1)
RBC # BLD AUTO: 4.81 X10(6)UL
SODIUM SERPL-SCNC: 131 MMOL/L (ref 136–145)
TOTAL CELLS COUNTED: 100
WBC # BLD AUTO: 23.1 X10(3) UL (ref 4–11)

## 2021-07-19 PROCEDURE — 0D9670Z DRAINAGE OF STOMACH WITH DRAINAGE DEVICE, VIA NATURAL OR ARTIFICIAL OPENING: ICD-10-PCS | Performed by: EMERGENCY MEDICINE

## 2021-07-19 PROCEDURE — 99223 1ST HOSP IP/OBS HIGH 75: CPT | Performed by: INTERNAL MEDICINE

## 2021-07-19 PROCEDURE — 74019 RADEX ABDOMEN 2 VIEWS: CPT | Performed by: EMERGENCY MEDICINE

## 2021-07-19 PROCEDURE — 3077F SYST BP >= 140 MM HG: CPT | Performed by: INTERNAL MEDICINE

## 2021-07-19 PROCEDURE — 3078F DIAST BP <80 MM HG: CPT | Performed by: INTERNAL MEDICINE

## 2021-07-19 RX ORDER — CYCLOBENZAPRINE HCL 10 MG
10 TABLET ORAL NIGHTLY
COMMUNITY

## 2021-07-19 RX ORDER — HEPARIN SODIUM 5000 [USP'U]/ML
5000 INJECTION, SOLUTION INTRAVENOUS; SUBCUTANEOUS EVERY 8 HOURS SCHEDULED
Status: DISCONTINUED | OUTPATIENT
Start: 2021-07-19 | End: 2021-08-02

## 2021-07-19 RX ORDER — ONDANSETRON 2 MG/ML
4 INJECTION INTRAMUSCULAR; INTRAVENOUS EVERY 4 HOURS PRN
Status: DISCONTINUED | OUTPATIENT
Start: 2021-07-19 | End: 2021-07-19

## 2021-07-19 RX ORDER — ALPRAZOLAM 2 MG/1
2 TABLET ORAL 2 TIMES DAILY PRN
COMMUNITY

## 2021-07-19 RX ORDER — DICYCLOMINE HCL 20 MG
20 TABLET ORAL
COMMUNITY

## 2021-07-19 RX ORDER — ONDANSETRON 2 MG/ML
4 INJECTION INTRAMUSCULAR; INTRAVENOUS EVERY 6 HOURS PRN
Status: DISCONTINUED | OUTPATIENT
Start: 2021-07-19 | End: 2021-08-02

## 2021-07-19 RX ORDER — HYDROMORPHONE HYDROCHLORIDE 1 MG/ML
0.4 INJECTION, SOLUTION INTRAMUSCULAR; INTRAVENOUS; SUBCUTANEOUS EVERY 2 HOUR PRN
Status: DISCONTINUED | OUTPATIENT
Start: 2021-07-19 | End: 2021-08-02

## 2021-07-19 RX ORDER — ONDANSETRON 2 MG/ML
4 INJECTION INTRAMUSCULAR; INTRAVENOUS ONCE
Status: COMPLETED | OUTPATIENT
Start: 2021-07-19 | End: 2021-07-19

## 2021-07-19 RX ORDER — IPRATROPIUM BROMIDE AND ALBUTEROL SULFATE 2.5; .5 MG/3ML; MG/3ML
SOLUTION RESPIRATORY (INHALATION)
Status: COMPLETED
Start: 2021-07-19 | End: 2021-07-19

## 2021-07-19 RX ORDER — HYDROCODONE BITARTRATE AND ACETAMINOPHEN 10; 325 MG/1; MG/1
1 TABLET ORAL EVERY 8 HOURS PRN
COMMUNITY

## 2021-07-19 RX ORDER — PROCHLORPERAZINE EDISYLATE 5 MG/ML
5 INJECTION INTRAMUSCULAR; INTRAVENOUS EVERY 8 HOURS PRN
Status: DISCONTINUED | OUTPATIENT
Start: 2021-07-19 | End: 2021-07-20

## 2021-07-19 RX ORDER — TRAZODONE HYDROCHLORIDE 50 MG/1
50 TABLET ORAL NIGHTLY
COMMUNITY

## 2021-07-19 RX ORDER — LISINOPRIL 20 MG/1
20 TABLET ORAL DAILY
COMMUNITY

## 2021-07-19 RX ORDER — HYDROMORPHONE HYDROCHLORIDE 1 MG/ML
0.8 INJECTION, SOLUTION INTRAMUSCULAR; INTRAVENOUS; SUBCUTANEOUS EVERY 2 HOUR PRN
Status: DISCONTINUED | OUTPATIENT
Start: 2021-07-19 | End: 2021-08-02

## 2021-07-19 RX ORDER — HYDRALAZINE HYDROCHLORIDE 50 MG/1
50 TABLET, FILM COATED ORAL DAILY
COMMUNITY

## 2021-07-19 RX ORDER — TAMSULOSIN HYDROCHLORIDE 0.4 MG/1
0.4 CAPSULE ORAL DAILY
Status: DISCONTINUED | OUTPATIENT
Start: 2021-07-19 | End: 2021-08-02

## 2021-07-19 RX ORDER — PREGABALIN 75 MG/1
75 CAPSULE ORAL NIGHTLY
Status: DISCONTINUED | OUTPATIENT
Start: 2021-07-19 | End: 2021-07-24

## 2021-07-19 RX ORDER — NIFEDIPINE 30 MG/1
30 TABLET, EXTENDED RELEASE ORAL DAILY
COMMUNITY

## 2021-07-19 RX ORDER — DEXTROAMPHETAMINE SACCHARATE, AMPHETAMINE ASPARTATE, DEXTROAMPHETAMINE SULFATE AND AMPHETAMINE SULFATE 7.5; 7.5; 7.5; 7.5 MG/1; MG/1; MG/1; MG/1
30 TABLET ORAL 2 TIMES DAILY
COMMUNITY

## 2021-07-19 RX ORDER — HYDROMORPHONE HYDROCHLORIDE 1 MG/ML
0.5 INJECTION, SOLUTION INTRAMUSCULAR; INTRAVENOUS; SUBCUTANEOUS EVERY 30 MIN PRN
Status: DISCONTINUED | OUTPATIENT
Start: 2021-07-19 | End: 2021-07-19

## 2021-07-19 RX ORDER — LIDOCAINE 50 MG/G
1 PATCH TOPICAL EVERY 24 HOURS
COMMUNITY

## 2021-07-19 RX ORDER — INDOMETHACIN 50 MG/1
50 CAPSULE ORAL 2 TIMES DAILY WITH MEALS
COMMUNITY

## 2021-07-19 RX ORDER — HYDROMORPHONE HYDROCHLORIDE 1 MG/ML
0.2 INJECTION, SOLUTION INTRAMUSCULAR; INTRAVENOUS; SUBCUTANEOUS EVERY 2 HOUR PRN
Status: DISCONTINUED | OUTPATIENT
Start: 2021-07-19 | End: 2021-08-02

## 2021-07-19 RX ORDER — ALPRAZOLAM 0.5 MG/1
2 TABLET ORAL 2 TIMES DAILY PRN
Status: DISCONTINUED | OUTPATIENT
Start: 2021-07-19 | End: 2021-08-02

## 2021-07-19 RX ORDER — SODIUM CHLORIDE 9 MG/ML
INJECTION, SOLUTION INTRAVENOUS CONTINUOUS
Status: DISCONTINUED | OUTPATIENT
Start: 2021-07-19 | End: 2021-07-23

## 2021-07-19 RX ORDER — DEXTROSE MONOHYDRATE 25 G/50ML
50 INJECTION, SOLUTION INTRAVENOUS
Status: DISCONTINUED | OUTPATIENT
Start: 2021-07-19 | End: 2021-07-26

## 2021-07-19 RX ORDER — LISINOPRIL 10 MG/1
20 TABLET ORAL DAILY
Status: DISCONTINUED | OUTPATIENT
Start: 2021-07-19 | End: 2021-07-23

## 2021-07-19 RX ORDER — HYDRALAZINE HYDROCHLORIDE 50 MG/1
50 TABLET, FILM COATED ORAL DAILY
Status: DISCONTINUED | OUTPATIENT
Start: 2021-07-19 | End: 2021-07-24

## 2021-07-19 RX ORDER — IPRATROPIUM BROMIDE AND ALBUTEROL SULFATE 2.5; .5 MG/3ML; MG/3ML
3 SOLUTION RESPIRATORY (INHALATION)
Status: DISCONTINUED | OUTPATIENT
Start: 2021-07-19 | End: 2021-07-20

## 2021-07-19 RX ORDER — PREGABALIN 75 MG/1
75 CAPSULE ORAL NIGHTLY
COMMUNITY

## 2021-07-19 RX ORDER — NIFEDIPINE 30 MG/1
30 TABLET, EXTENDED RELEASE ORAL DAILY
Status: DISCONTINUED | OUTPATIENT
Start: 2021-07-19 | End: 2021-07-28

## 2021-07-19 RX ORDER — ALBUTEROL SULFATE 90 UG/1
1 AEROSOL, METERED RESPIRATORY (INHALATION) EVERY 6 HOURS PRN
Status: DISCONTINUED | OUTPATIENT
Start: 2021-07-19 | End: 2021-08-02

## 2021-07-19 RX ORDER — FAMOTIDINE 10 MG/ML
20 INJECTION, SOLUTION INTRAVENOUS DAILY
Status: DISCONTINUED | OUTPATIENT
Start: 2021-07-19 | End: 2021-07-26 | Stop reason: ALTCHOICE

## 2021-07-19 RX ORDER — DEXTROAMPHETAMINE SACCHARATE, AMPHETAMINE ASPARTATE, DEXTROAMPHETAMINE SULFATE AND AMPHETAMINE SULFATE 2.5; 2.5; 2.5; 2.5 MG/1; MG/1; MG/1; MG/1
30 TABLET ORAL 2 TIMES DAILY
Status: DISCONTINUED | OUTPATIENT
Start: 2021-07-19 | End: 2021-08-02

## 2021-07-19 RX ORDER — MELOXICAM 15 MG/1
15 TABLET ORAL DAILY
COMMUNITY

## 2021-07-19 RX ORDER — TRAZODONE HYDROCHLORIDE 50 MG/1
50 TABLET ORAL NIGHTLY
Status: DISCONTINUED | OUTPATIENT
Start: 2021-07-19 | End: 2021-08-02

## 2021-07-19 RX ORDER — INSULIN LISPRO 100 [IU]/ML
20 INJECTION, SOLUTION INTRAVENOUS; SUBCUTANEOUS
COMMUNITY

## 2021-07-19 NOTE — ED INITIAL ASSESSMENT (HPI)
66YM c/c of abd pain Pt state that he was here yesterday and decided to leave AMA after having abd surgery Pt state that he back today with increased pain and vomiting

## 2021-07-19 NOTE — ED QUICK NOTES
Transport at bedside to take pt to room. Pt asking to get something to drink out of vending machine so he can drink something later. Pt aware that he has an NG tube in place that he can not drink anything.

## 2021-07-19 NOTE — PAYOR COMM NOTE
--------------  DISCHARGE REVIEW    Payor: Justo Cabezas Edelstein #:  [id number]  Authorization Number: [id number]    Admit date: 7/12/21  Admit time:   1:32 AM  Discharge Date: 7/18/2021  3:55 PM

## 2021-07-19 NOTE — PLAN OF CARE
NURSING ADMISSION NOTE      Patient admitted via Cart  Oriented to room. Safety precautions initiated. Bed in low position. Call light in reach. A&O x4, drowsy. Denies any CP, LEVI, or calf pain at present. Lungs clear and diminished bilaterally. INTERVENTIONS:  - Assess pt frequently for physical needs  - Identify cognitive and physical deficits and behaviors that affect risk of falls.   - Buena fall precautions as indicated by assessment.  - Educate pt/family on patient safety including physic

## 2021-07-19 NOTE — H&P
[facility]  History and Physical     Jrandrew Camargo Patient Status:  Emergency    1955 MRN [id number]   Location [address] Attending Nishant Lozada MD   [address] Day # 0 PCP Ap Posadas MD     Chief Complaint Disp: , Rfl:   amphetamine-dextroamphetamine 30 MG Oral Tab, Take 30 mg by mouth 2 (two) times daily. , Disp: , Rfl:   cyclobenzaprine 10 MG Oral Tab, Take 10 mg by mouth nightly., Disp: , Rfl:   Dicyclomine HCl 20 MG Oral Tab, Take 20 mg by mouth 4 (four) Inhalation Aero Soln, Inhale 1 puff into the lungs every 6 (six) hours as needed for Wheezing. Indications: Asthma, Disp: , Rfl:   oxyCODONE HCl 5 MG Oral Tab, Take 1 tablet (5 mg total) by mouth every 4 (four) hours as needed. , Disp: 60 tablet, Rfl: 0  Na Estimated Creatinine Clearance: 68.4 mL/min (A) (based on SCr of 1.34 mg/dL (H)). No results for input(s): PTP, INR in the last 168 hours.     COVID-19 Lab Results    COVID-19  Lab Results   Component Value Date    COVID19 Not Detected 07/11/2021

## 2021-07-19 NOTE — CONSULTS
[facility]  Report of Consultation    Alyse Morales Patient Status:  Emergency    1955 MRN [id number]   Location [address] [facility] Attending Tahira Castaneda MD   [facility] Day # 0 PCP Ez Kendrick MD     [facility] ([facility]) home.      History:  Past Medical History:   Diagnosis Date   • Asthma    • Essential hypertension    • Extrinsic asthma, unspecified    • Pneumonia    • Type II or unspecified type diabetes mellitus without mention of complication, not stated as uncontrol TROMETHAMINE 0.5 % OPHTHALMIC SOLUTION    Place 2 drops into both eyes 3 (three) times daily. MONTELUKAST SODIUM 10 MG ORAL TAB    Take 1 tablet (10 mg total) by mouth nightly. MULTIVITAMIN WITH MINERALS ORAL TAB    Take 1 tablet by mouth daily.     Marino Zuluaga vomiting. Negative for constipation.    Genitourinary:  Negative for dysuria and hematuria  Neurological:  Negative for gait disturbance  Psychiatric:  Negative for inappropriate interaction and psychiatric symptoms  Respiratory:  Negative for cough, dyspne report)  PROCEDURE:  XR ABDOMEN OBSTRUCTIVE SERIES ROUTINE(2 VW)(CPT=74019)       TECHNIQUE:  2 view obstructive series of the abdomen and pelvis were obtained.       COMPARISON:  [facility] , XR, XR ABDOMEN (1 VIEW) (CPT=74018), 3/29/2019, 2:52 PM.  NATALIAXIQK , dependence ([facility]     [facility]anoid ideation ([facility]     Chronic right shoulder pain     Aftercare following right shoulder joint replacement surgery     Cervical radiculitis     Avascular necrosis of humeral head, left ([facility]     Peripheral vascular disease ([facility] underwent a partial amputation of the right hallux on July 16, 2021. Postoperatively, the patient had nausea and vomiting requiring NG tube placement. Yesterday, he signed out [address] and his NG tube was removed.   He went home and states h

## 2021-07-19 NOTE — ED PROVIDER NOTES
Patient Seen in: [facility] Emergency Department      History   Patient presents with:  Abdomen/Flank Pain    Stated Complaint: abd pain     HPI/Subjective:   HPI    Patient presents with abdominal pain.   I admitted the patient recently with a small SpO2 (!) 89 %   O2 Device None (Room air)       Current:/70   Pulse 109   Temp 98.9 °F (37.2 °C) (Oral)   Resp 19   Wt 102.1 kg   SpO2 97%   BMI 27.40 kg/m²         Physical Exam    General: Awake, appears fatigued, in no acute distress.   HEENT: No view obstructive series of the abdomen and pelvis were obtained.   COMPARISON:  [facility] , XR, XR ABDOMEN (1 VIEW) (CPT=74018), 3/29/2019, 2:52 PM.  [facility] , XR, XR ABDOMEN (1 VIEW) (CPT=74018), 3/25/2019, 11:38 PM.  INDICATIONS:  abd pain  PATIENT STATED HIS specified.         Medications Prescribed:  Current Discharge Medication List                          Hospital Problems     Present on Admission  Date Reviewed: 12/27/2019        ICD-10-CM Noted POA    * (Principal) Small bowel obstruction ([facility] K56.609 7/

## 2021-07-20 LAB
ALLENS TEST: POSITIVE
ANION GAP SERPL CALC-SCNC: 3 MMOL/L (ref 0–18)
ARTERIAL BLD GAS O2 SATURATION: 86 % (ref 92–100)
ARTERIAL BLOOD GAS BASE EXCESS: 23 MMOL/L (ref ?–2)
ARTERIAL BLOOD GAS HCO3: 49.6 MEQ/L (ref 22–26)
ARTERIAL BLOOD GAS PCO2: 59 MM HG (ref 35–45)
ARTERIAL BLOOD GAS PH: 7.54 (ref 7.35–7.45)
ARTERIAL BLOOD GAS PO2: 50 MM HG (ref 80–105)
BASOPHILS # BLD: 0 X10(3) UL (ref 0–0.2)
BASOPHILS NFR BLD: 0 %
BUN BLD-MCNC: 44 MG/DL (ref 7–18)
BUN/CREAT SERPL: 42.7 (ref 10–20)
CALCIUM BLD-MCNC: 8.9 MG/DL (ref 8.5–10.1)
CALCULATED O2 SATURATION: 92 % (ref 92–100)
CARBOXYHEMOGLOBIN: 2 % SAT (ref 0–3)
CHLORIDE SERPL-SCNC: 94 MMOL/L (ref 98–112)
CO2 SERPL-SCNC: 44 MMOL/L (ref 21–32)
CREAT BLD-MCNC: 1.03 MG/DL
DEPRECATED RDW RBC AUTO: 41.8 FL (ref 35.1–46.3)
EOSINOPHIL # BLD: 0 X10(3) UL (ref 0–0.7)
EOSINOPHIL NFR BLD: 0 %
ERYTHROCYTE [DISTWIDTH] IN BLOOD BY AUTOMATED COUNT: 12.6 % (ref 11–15)
GLUCOSE BLD-MCNC: 101 MG/DL (ref 70–99)
GLUCOSE BLD-MCNC: 114 MG/DL (ref 70–99)
GLUCOSE BLD-MCNC: 114 MG/DL (ref 70–99)
GLUCOSE BLD-MCNC: 119 MG/DL (ref 70–99)
GLUCOSE BLD-MCNC: 127 MG/DL (ref 70–99)
HCT VFR BLD AUTO: 44.1 %
HGB BLD-MCNC: 14 G/DL
IONIZED CALCIUM: 1.05 MMOL/L (ref 1.12–1.32)
L/M: 3 L/MIN
LACTIC ACID ARTERIAL: <1.6 MMOL/L (ref 0.5–2)
LYMPHOCYTES NFR BLD: 1.43 X10(3) UL (ref 1–4)
LYMPHOCYTES NFR BLD: 5 %
MCH RBC QN AUTO: 28.9 PG (ref 26–34)
MCHC RBC AUTO-ENTMCNC: 31.7 G/DL (ref 31–37)
MCV RBC AUTO: 90.9 FL
METHEMOGLOBIN: 0.6 % SAT (ref 0.4–1.5)
MONOCYTES # BLD: 0.86 X10(3) UL (ref 0.1–1)
MONOCYTES NFR BLD: 3 %
NEUTROPHILS # BLD AUTO: 25.75 X10 (3) UL (ref 1.5–7.7)
NEUTROPHILS NFR BLD: 84 %
NEUTS BAND NFR BLD: 8 %
NEUTS HYPERSEG # BLD: 26.31 X10(3) UL (ref 1.5–7.7)
OSMOLALITY SERPL CALC.SUM OF ELEC: 305 MOSM/KG (ref 275–295)
PATIENT TEMPERATURE: 98.2 F
PLATELET # BLD AUTO: 393 10(3)UL (ref 150–450)
PLATELET MORPHOLOGY: NORMAL
POTASSIUM BLOOD GAS: 2.7 MMOL/L (ref 3.6–5.1)
POTASSIUM SERPL-SCNC: 3.1 MMOL/L (ref 3.5–5.1)
RBC # BLD AUTO: 4.85 X10(6)UL
SODIUM BLOOD GAS: 144 MMOL/L (ref 136–144)
SODIUM SERPL-SCNC: 141 MMOL/L (ref 136–145)
TOTAL CELLS COUNTED: 100
TOTAL HEMOGLOBIN: 13.8 G/DL
WBC # BLD AUTO: 28.6 X10(3) UL (ref 4–11)

## 2021-07-20 PROCEDURE — 99232 SBSQ HOSP IP/OBS MODERATE 35: CPT | Performed by: INTERNAL MEDICINE

## 2021-07-20 RX ORDER — BUDESONIDE 0.5 MG/2ML
0.5 INHALANT ORAL
Status: DISCONTINUED | OUTPATIENT
Start: 2021-07-20 | End: 2021-08-02

## 2021-07-20 RX ORDER — IPRATROPIUM BROMIDE AND ALBUTEROL SULFATE 2.5; .5 MG/3ML; MG/3ML
3 SOLUTION RESPIRATORY (INHALATION)
Status: DISCONTINUED | OUTPATIENT
Start: 2021-07-20 | End: 2021-07-22

## 2021-07-20 RX ORDER — METOCLOPRAMIDE HYDROCHLORIDE 5 MG/ML
10 INJECTION INTRAMUSCULAR; INTRAVENOUS EVERY 6 HOURS
Status: DISCONTINUED | OUTPATIENT
Start: 2021-07-20 | End: 2021-07-23

## 2021-07-20 NOTE — PLAN OF CARE
A&Ox4. VSS. RA. . Irritable towards staff. GI: Abdomen soft, distended. Passing gas per patient report. Denies nausea at this time. : Voids.   Pain controlled with PRN pain medications  Up with standby assist. NWB on right foot--noncompliant at time assistance  - Assess pain using appropriate pain scale  - Administer analgesics based on type and severity of pain and evaluate response  - Implement non-pharmacological measures as appropriate and evaluate response  - Consider cultural and social influenc activity  - Obtain nutritional consult as needed  - Establish a toileting routine/schedule  - Consider collaborating with pharmacy to review patient's medication profile  Outcome: Progressing

## 2021-07-20 NOTE — PLAN OF CARE
Patient is alert and oriented. Appears lethargic. Currently on 5L of O2 via nasal cannula. Denies SOB/ chest pain. Abdomen is soft/ non-distended. Patient states passing gas. Denies nausea. Voiding in urinal. NGT to LIS secured at 58.  Clamping trial today, fall precautions as indicated by assessment.  - Educate pt/family on patient safety including physical limitations  - Instruct pt to call for assistance with activity based on assessment  - Modify environment to reduce risk of injury  - Provide assistive d

## 2021-07-20 NOTE — DISCHARGE SUMMARY
[facility]  DISCHARGE SUMMARY     Tom Zuniga Patient Status:  Inpatient    1955 MRN [id number]   [facility] 3NW-A Attending No att. providers found   Hosp Day # 6 PCP Konrad Terrell MD     Date of Admission: 2021  Date hours as needed.    Quantity: 60 tablet  Refills: 0     predniSONE 20 MG Tabs  Commonly known as: DELTASONE      Take 2 tablets daily for 2 days Take 1 tablet daily for 2 days then stop   Quantity: 6 tablet  Refills: 0        CONTINUE taking these medicatio -----------------------------------------------------------------------------------------------  PATIENT DISCHARGE INSTRUCTIONS: See electronic chart    Darrell Chavez MD    Time spent:  > 30 minutes

## 2021-07-20 NOTE — CONSULTS
[facility]  Report of Consultation    Charles Cardoza Patient Status:  Inpatient    1955 MRN [id number]   [facility] 3NW-A Attending Angelo Flores MD   [facility] Day # 1 PCP Inessa Barone MD     Reason for Consultation:  Hypercap Problem Relation Age of Onset   • Cancer Mother    • Heart Disorder Father    • Cancer Father    • Diabetes Father    • Other (Other) Other       reports that he quit smoking about 14 years ago. He quit smokeless tobacco use about 18 years ago.  He report nightly., Disp: , Rfl: , Past Week at Unknown time  insulin detemir 100 UNIT/ML Subcutaneous Solution, Inject 40 Units into the skin nightly., Disp: , Rfl: , Past Week at Unknown time  Insulin Lispro 100 UNIT/ML Subcutaneous Solution, Inject 20 Units into pain  Gastrointestinal: As above  Musculoskeletal: States his right foot continues to hurt from the recent surgery denies any swelling  Neurological: Denies any neurologic changes     All other review of systems are negative.   Thinks his CPAP is working fo Total cultures positive for fusiform bacteria as well as Morganella, Proteus and group B strep    Radiology:  No results found.   cxr epnding   abd film with distension     Assessment and Plan:  Patient Active Problem List:     Shoulder pain     Recurrent metabolic alkalosis suspect related to chronic NG tube drainage/fluid shifts  · Known ERIKA on CPAP at night with plans to resume    Plan:  · cpap auto to begin   · Add steroids nebs and hope to avoid parenteral steroids   · Recheck labs and cxr and abg - ho

## 2021-07-20 NOTE — PAYOR COMM NOTE
--------------  ADMISSION REVIEW     Payor: Za Garcia #:  [id number]  Authorization Number: [id number]       ED Provider Notes          History   Patient presents with:  Abdomen/Flank Pain    Stated Complaint: abd pain     HPI/S 20   Temp 98.9 °F (37.2 °C)   Temp src Oral   SpO2 (!) 89 %   O2 Device None (Room air)       Current:/70   Pulse 109   Temp 98.9 °F (37.2 °C) (Oral)   Resp 19   Wt 102.1 kg   SpO2 97%   BMI 27.40 kg/m²         Physical Exam    General: Awake, appear air noted. There is minimal gaseous distension of the stomach and small bowel loops in the mid abdomen with air-fluid levels. There is stool noted to the level of the rectum. Findings may represent mild ileus versus early small bowel obstruction.  Shaune Blocker 900cc returned. Review of Systems:   A comprehensive 14 point review of systems was completed. Pertinent positives and negatives noted in the HPI.     Physical Exam:    /69   Pulse 106   Temp 98.9 °F (37.2 °C) (Oral)   Resp 19   Wt 225 lb 1.4 o abx  4. Acute REnal Insuff  1. Continue IVF  2. Monitor BMP  5. Ess HTN  1. Continue home meds  6. DM2  1. SSI  7. Asthma  1. Continue home meds  8.  BPH  1. tamsulosin        7/19 Adelaidamichelle Sheikh is a 61year old male who underwent exploratory laparotomy, l of asthma  · Hypercapnia-with metabolic alkalosis suspect related to chronic NG tube drainage/fluid shifts  · Known ERIKA on CPAP at night with plans to resume     Plan:  · cpap auto to begin   · Add steroids nebs and hope to avoid parenteral steroids   · Re detemir (LEVEMIR) 100 UNIT/ML flextouch 20 Units     Date Action Dose Route User    7/19/2021 2122 Given 20 Units Subcutaneous (Right Lower Abdomen) Marya Arredondo RN      ipratropium-albuterol (DUONEB) nebulizer solution 3 mL     Date Action Dose Route

## 2021-07-20 NOTE — PROGRESS NOTES
[facility]  Progress Note     Harry Herrera Patient Status:  Inpatient    1955 MRN [id number]   [facility] 3NW-A Attending Roselia Fields MD   Hosp Day # 1 PCP Loretta Wakefield MD     Chief Complaint: abd pain    S: Patient w hours.         COVID-19 Lab Results    COVID-19  Lab Results   Component Value Date    COVID19 Not Detected 07/11/2021       Pro-Calcitonin  No results for input(s): PCT in the last 168 hours.     Cardiac  No results for input(s): TROP, PBNP in the last 168 management, hopefully start CLD if passes clamp trial    Quality:  · DVT Prophylaxis: HSQ  · CODE status: full  · Jones: none  · Central line: none  · If COVID testing is negative, may discontinue isolation: yes     Will the patient be referred to [facility] on d

## 2021-07-20 NOTE — PROGRESS NOTES
[facility]  Progress Note    Dane Feeling Patient Status:  Inpatient    1955 MRN [id number]   [facility] 3NW-A Attending Ruben Case MD   [address] Day # 1 PCP Antony Holm MD     Subjective:   The patient states that he is fe asthma     Hyperglycemia     Severe persistent asthma with acute exacerbation     Opioid abuse (HCC)     Chronic, continuous use of opioids     Agitation     Viral URI     Respiratory distress     Adhesive capsulitis of right shoulder     Alcohol withdrawa A/P POD #9 SB resection  POD #4 partial amputation right hallux     1. We will not remove NG tube today since outputs are quite high. 2. I have started the patient on scheduled Reglan via the IV  3. He may have 1 cup of tea, as he requested.   4. Ambulat

## 2021-07-21 ENCOUNTER — APPOINTMENT (OUTPATIENT)
Dept: GENERAL RADIOLOGY | Facility: HOSPITAL | Age: 66
DRG: 335 | End: 2021-07-21
Attending: INTERNAL MEDICINE
Payer: MEDICARE

## 2021-07-21 ENCOUNTER — APPOINTMENT (OUTPATIENT)
Dept: CT IMAGING | Facility: HOSPITAL | Age: 66
DRG: 335 | End: 2021-07-21
Attending: INTERNAL MEDICINE
Payer: MEDICARE

## 2021-07-21 PROBLEM — I10 ESSENTIAL HYPERTENSION: Status: ACTIVE | Noted: 2019-03-13

## 2021-07-21 LAB
ALBUMIN SERPL-MCNC: 2.7 G/DL (ref 3.4–5)
ALBUMIN/GLOB SERPL: 0.6 {RATIO} (ref 1–2)
ALLENS TEST: POSITIVE
ALP LIVER SERPL-CCNC: 108 U/L
ALT SERPL-CCNC: 21 U/L
ANION GAP SERPL CALC-SCNC: 2 MMOL/L (ref 0–18)
ARTERIAL BLD GAS O2 SATURATION: 89 % (ref 92–100)
ARTERIAL BLOOD GAS BASE EXCESS: 28.9 MMOL/L (ref ?–2)
ARTERIAL BLOOD GAS HCO3: 56.4 MEQ/L (ref 22–26)
ARTERIAL BLOOD GAS PCO2: 63 MM HG (ref 35–45)
ARTERIAL BLOOD GAS PH: 7.57 (ref 7.35–7.45)
ARTERIAL BLOOD GAS PO2: 54 MM HG (ref 80–105)
AST SERPL-CCNC: 14 U/L (ref 15–37)
BASOPHILS # BLD AUTO: 0.1 X10(3) UL (ref 0–0.2)
BASOPHILS NFR BLD AUTO: 0.4 %
BILIRUB SERPL-MCNC: 1 MG/DL (ref 0.1–2)
BILIRUB UR QL STRIP.AUTO: NEGATIVE
BUN BLD-MCNC: 47 MG/DL (ref 7–18)
BUN/CREAT SERPL: 43.9 (ref 10–20)
CALCIUM BLD-MCNC: 8.8 MG/DL (ref 8.5–10.1)
CALCULATED O2 SATURATION: 94 % (ref 92–100)
CARBOXYHEMOGLOBIN: 1.4 % SAT (ref 0–3)
CHLORIDE SERPL-SCNC: 91 MMOL/L (ref 98–112)
CLARITY UR REFRACT.AUTO: CLEAR
CO2 SERPL-SCNC: 49 MMOL/L (ref 21–32)
COLOR UR AUTO: YELLOW
CREAT BLD-MCNC: 1.07 MG/DL
DEPRECATED RDW RBC AUTO: 44.3 FL (ref 35.1–46.3)
EOSINOPHIL # BLD AUTO: 0 X10(3) UL (ref 0–0.7)
EOSINOPHIL NFR BLD AUTO: 0 %
ERYTHROCYTE [DISTWIDTH] IN BLOOD BY AUTOMATED COUNT: 12.8 % (ref 11–15)
GLOBULIN PLAS-MCNC: 4.4 G/DL (ref 2.8–4.4)
GLUCOSE BLD-MCNC: 151 MG/DL (ref 70–99)
GLUCOSE BLD-MCNC: 185 MG/DL (ref 70–99)
GLUCOSE BLD-MCNC: 198 MG/DL (ref 70–99)
GLUCOSE BLD-MCNC: 238 MG/DL (ref 70–99)
GLUCOSE BLD-MCNC: 245 MG/DL (ref 70–99)
GLUCOSE BLD-MCNC: 271 MG/DL (ref 70–99)
GLUCOSE UR STRIP.AUTO-MCNC: NEGATIVE MG/DL
HCT VFR BLD AUTO: 48.2 %
HGB BLD-MCNC: 14.6 G/DL
HYALINE CASTS #/AREA URNS AUTO: PRESENT /LPF
IMM GRANULOCYTES # BLD AUTO: 0.23 X10(3) UL (ref 0–1)
IMM GRANULOCYTES NFR BLD: 0.9 %
IONIZED CALCIUM: 0.97 MMOL/L (ref 1.12–1.32)
KETONES UR STRIP.AUTO-MCNC: 20 MG/DL
L/M: 10 L/MIN
LACTIC ACID ARTERIAL: 1.7 MMOL/L (ref 0.5–2)
LEUKOCYTE ESTERASE UR QL STRIP.AUTO: NEGATIVE
LYMPHOCYTES # BLD AUTO: 0.79 X10(3) UL (ref 1–4)
LYMPHOCYTES NFR BLD AUTO: 3 %
M PROTEIN MFR SERPL ELPH: 7.1 G/DL (ref 6.4–8.2)
MCH RBC QN AUTO: 28.6 PG (ref 26–34)
MCHC RBC AUTO-ENTMCNC: 30.3 G/DL (ref 31–37)
MCV RBC AUTO: 94.3 FL
METHEMOGLOBIN: 0.6 % SAT (ref 0.4–1.5)
MONOCYTES # BLD AUTO: 1.13 X10(3) UL (ref 0.1–1)
MONOCYTES NFR BLD AUTO: 4.2 %
NEUTROPHILS # BLD AUTO: 24.44 X10 (3) UL (ref 1.5–7.7)
NEUTROPHILS # BLD AUTO: 24.44 X10(3) UL (ref 1.5–7.7)
NEUTROPHILS NFR BLD AUTO: 91.5 %
NITRITE UR QL STRIP.AUTO: NEGATIVE
OSMOLALITY SERPL CALC.SUM OF ELEC: 312 MOSM/KG (ref 275–295)
PATIENT TEMPERATURE: 97.9 F
PH UR STRIP.AUTO: 7 [PH] (ref 5–8)
PHOSPHATE SERPL-MCNC: 3.7 MG/DL (ref 2.5–4.9)
PLATELET # BLD AUTO: 364 10(3)UL (ref 150–450)
POTASSIUM BLOOD GAS: 2.6 MMOL/L (ref 3.6–5.1)
POTASSIUM SERPL-SCNC: 2.9 MMOL/L (ref 3.5–5.1)
POTASSIUM SERPL-SCNC: 2.9 MMOL/L (ref 3.5–5.1)
PROCALCITONIN SERPL-MCNC: 0.23 NG/ML (ref ?–0.16)
PROT UR STRIP.AUTO-MCNC: NEGATIVE MG/DL
RBC # BLD AUTO: 5.11 X10(6)UL
RBC UR QL AUTO: NEGATIVE
SODIUM BLOOD GAS: 150 MMOL/L (ref 136–144)
SODIUM SERPL-SCNC: 142 MMOL/L (ref 136–145)
SP GR UR STRIP.AUTO: 1.02 (ref 1–1.03)
TOTAL HEMOGLOBIN: 14.2 G/DL
UROBILINOGEN UR STRIP.AUTO-MCNC: <2 MG/DL
WBC # BLD AUTO: 26.7 X10(3) UL (ref 4–11)

## 2021-07-21 PROCEDURE — 99222 1ST HOSP IP/OBS MODERATE 55: CPT | Performed by: INTERNAL MEDICINE

## 2021-07-21 PROCEDURE — 99232 SBSQ HOSP IP/OBS MODERATE 35: CPT | Performed by: HOSPITALIST

## 2021-07-21 PROCEDURE — 71275 CT ANGIOGRAPHY CHEST: CPT | Performed by: INTERNAL MEDICINE

## 2021-07-21 PROCEDURE — 99232 SBSQ HOSP IP/OBS MODERATE 35: CPT | Performed by: SURGERY

## 2021-07-21 PROCEDURE — 71045 X-RAY EXAM CHEST 1 VIEW: CPT | Performed by: INTERNAL MEDICINE

## 2021-07-21 RX ORDER — METOPROLOL TARTRATE 5 MG/5ML
5 INJECTION INTRAVENOUS EVERY 6 HOURS PRN
Status: DISCONTINUED | OUTPATIENT
Start: 2021-07-21 | End: 2021-07-27

## 2021-07-21 RX ORDER — POTASSIUM CHLORIDE 14.9 MG/ML
20 INJECTION INTRAVENOUS ONCE
Status: COMPLETED | OUTPATIENT
Start: 2021-07-21 | End: 2021-07-21

## 2021-07-21 RX ORDER — HYDRALAZINE HYDROCHLORIDE 20 MG/ML
10 INJECTION INTRAMUSCULAR; INTRAVENOUS EVERY 4 HOURS PRN
Status: DISCONTINUED | OUTPATIENT
Start: 2021-07-21 | End: 2021-07-27

## 2021-07-21 NOTE — PROGRESS NOTES
Pt arrived in ICU. Placed in bed and on monitor. Pt drowsy. Alertx3. Seems confused to Plan of care. Agitated re: Keeping ICE chips and Cell phone. Both returned after settled in bed and on monitor. Pt placed on Vapotherm. Breathing easy. Non labored.  SaO2

## 2021-07-21 NOTE — PROGRESS NOTES
[facility]  Progress Note     Yoseph Busch Patient Status:  Inpatient    1955 MRN [id number]   [facility] 3NW-A Attending Mihaela Penaloza MD   Hosp Day # 2 PCP Campbell Hernandez MD     Chief Complaint: abd pain    S: Pt cont t (based on SCr of 1.07 mg/dL). No results for input(s): PTP, INR in the last 168 hours.          COVID-19 Lab Results    COVID-19  Lab Results   Component Value Date    COVID19 Not Detected 07/11/2021       Pro-Calcitonin  No results for input(s): PCT in symptoms  2. May be reactive, no signs of peritonitis currently  3. Monitor off abx  6. Acute Renal Insuff  1. Improved with hydration  7. Ess HTN  1. Continue home meds  8. DM2  1. SSI  9. Asthma  1. Continue home meds  10.  BPH  1. tamsulosin    POC: pt c

## 2021-07-21 NOTE — CONSULTS
[facility] Patient Status:  Inpatient    1955 MRN [id number]   [facility] 4SW-A Attending Annel Cochran MD   [facility] Day # 2 PCP Eugenio Andres MD     Date of Consult: 2021  Pa to ICU today. Also asked him about his NG tube. Pt was readmitted within 24 hrs of him leaving AMA. -- pt said\"if my numbers are better then I can go home later\" and called his daughter on phone and asked her to have her CPAP ready.  He mentioned gurdeep (REGLAN) injection 10 mg, 10 mg, Intravenous, Q6H  •  ipratropium-albuterol (DUONEB) nebulizer solution 3 mL, 3 mL, Nebulization, Q4H WA (5 times daily)  •  budesonide (PULMICORT) 0.5 MG/2ML nebulizer solution 0.5 mg, 0.5 mg, Nebulization, 2 times daily  • PRN  •  Insulin Aspart Pen (NOVOLOG) 100 UNIT/ML flexpen 1-10 Units, 1-10 Units, Subcutaneous, Q6H  •  famoTIDine (PEPCID) injection 20 mg, 20 mg, Intravenous, Daily  No current outpatient medications on file.       Hematology:  Lab Results   Component Valu degenerative changes of the lower lumbar spine.     Dictated by (CST): Yannick Singh MD on 7/21/2021 at 7:47 AM     Finalized by (CST): Yannick Singh MD on 7/21/2021 at 7:49 AM         Objective:  Vital Signs:  Blood pressure 150/81, pulse 112, te [address] [place] [address] [phone]

## 2021-07-21 NOTE — PLAN OF CARE
Problem: Patient/Family Goals  Goal: Patient/Family Long Term Goal  Description: Patient's Long Term Goal: Discharge    Interventions:  -Bowel rest  - See additional Care Plan goals for specific interventions  Outcome: Progressing  Goal: Patient/Family S Maintain adequate hydration with IV or PO as ordered and tolerated  - Nasogastric tube to low intermittent suction as ordered  - Evaluate effectiveness of ordered antiemetic medications  - Provide nonpharmacologic comfort measures as appropriate  - Advance

## 2021-07-21 NOTE — PROGRESS NOTES
Pt. Drowsy at times, after Dilaudid given. Pt. Was put on CPAP at night, did not tolerate, kept taking off, pt. Also with NG tube. Pt. Placed back on 5L NC, saturating in the 80's, O2 up to 6L, still 88-90% at best. Spoke with RT re: pt. Status.  Pt. Josefa Araiza

## 2021-07-21 NOTE — PROGRESS NOTES
On license of [facility] Note: Antimicrobial Weight Based Dose Adjustment for: piperacillin/tazobactam (Luellen Comp)    Lola Calderon is a 61year old patient who has been prescribed piperacillin/tazobactam (ZOSYN) 3.375 g every 8 hours.     Estimated Creatinine Clearance: 8

## 2021-07-21 NOTE — PROGRESS NOTES
[facility] Pulmonary/Critical Care Progress Note     SUBJECTIVE/Interval history: All events, procedures, notes reviewed. tx to ICU this morning due to worsening hypoxia.  He denies all complaints and only will discuss w CO2 35.0* 44.0* 49.0*     Recent Labs   Lab 07/19/21  0729 07/20/21  0559 07/21/21  0511   RBC 4.81 4.85 5.11   HGB 13.9 14.0 14.6   HCT 42.9 44.1 48.2   MCV 89.2 90.9 94.3   MCH 28.9 28.9 28.6   MCHC 32.4 31.7 30.3*   RDW 12.7 12.6 12.8   NEPRELIM 19.83 the mid abdomen with air-fluid levels. There is stool noted to the level of the rectum. Findings may represent mild ileus versus early small bowel obstruction. Minimal discoid atelectasis right lung base.     Dictated by (CST): Khang Castorena MD on 7/ status, wean o2 for sats >89%, remains on vapotherm presently  Given clear chest imaging, need to assess for PE given the large shunt leading to hypoxia; check CTA  Continue nebulized bronchodilators  NGT remains inplace with large output; surgery followin

## 2021-07-21 NOTE — PROGRESS NOTES
[facility]  Progress Note    Syed Love Patient Status:  Inpatient    1955 MRN [id number]   [facility] 4SW-A Attending Andrea Spring MD   [facility] Day # 2 PCP Latonia Sommers MD     Subjective: The patient is resting in bed.  He c erythema. Staples remain in place.     Labs:  Lab Results   Component Value Date    WBC 26.7 07/21/2021    HGB 14.6 07/21/2021    HCT 48.2 07/21/2021    .0 07/21/2021      Lab Results   Component Value Date    INR 1.11 (H) 03/27/2019    INR 1.06 03/ Chronic right shoulder pain     Aftercare following right shoulder joint replacement surgery     Cervical radiculitis     Avascular necrosis of humeral head, left (HCC)     Peripheral vascular disease (HCC)     Ulcerated, foot, right, limited to breakdown denies abdominal pain states that he does have some shortness of breath. He speaks in clipped words. His abdomen has hypoactive bowel sounds it is scaphoid there is no rebound guarding or rigidity.   Patient admitted to ICU for consolidation of left lung

## 2021-07-22 ENCOUNTER — APPOINTMENT (OUTPATIENT)
Dept: CT IMAGING | Facility: HOSPITAL | Age: 66
DRG: 335 | End: 2021-07-22
Attending: SURGERY
Payer: MEDICARE

## 2021-07-22 LAB
ALBUMIN SERPL-MCNC: 2.8 G/DL (ref 3.4–5)
ALBUMIN/GLOB SERPL: 0.6 {RATIO} (ref 1–2)
ALLENS TEST: POSITIVE
ALP LIVER SERPL-CCNC: 134 U/L
ALT SERPL-CCNC: 18 U/L
ANION GAP SERPL CALC-SCNC: 2 MMOL/L (ref 0–18)
ARTERIAL BLD GAS O2 SATURATION: 90 % (ref 92–100)
ARTERIAL BLOOD GAS BASE EXCESS: 20.8 MMOL/L (ref ?–2)
ARTERIAL BLOOD GAS HCO3: 47.3 MEQ/L (ref 22–26)
ARTERIAL BLOOD GAS PCO2: 58 MM HG (ref 35–45)
ARTERIAL BLOOD GAS PH: 7.53 (ref 7.35–7.45)
ARTERIAL BLOOD GAS PO2: 57 MM HG (ref 80–105)
AST SERPL-CCNC: 19 U/L (ref 15–37)
BASOPHILS # BLD AUTO: 0.09 X10(3) UL (ref 0–0.2)
BASOPHILS NFR BLD AUTO: 0.3 %
BILIRUB SERPL-MCNC: 1.4 MG/DL (ref 0.1–2)
BUN BLD-MCNC: 60 MG/DL (ref 7–18)
BUN/CREAT SERPL: 35.3 (ref 10–20)
CALCIUM BLD-MCNC: 8.9 MG/DL (ref 8.5–10.1)
CALCULATED O2 SATURATION: 94 % (ref 92–100)
CARBOXYHEMOGLOBIN: 1.6 % SAT (ref 0–3)
CHLORIDE SERPL-SCNC: 102 MMOL/L (ref 98–112)
CO2 SERPL-SCNC: 45 MMOL/L (ref 21–32)
CREAT BLD-MCNC: 1.7 MG/DL
DEPRECATED RDW RBC AUTO: 47.7 FL (ref 35.1–46.3)
EOSINOPHIL # BLD AUTO: 0 X10(3) UL (ref 0–0.7)
EOSINOPHIL NFR BLD AUTO: 0 %
ERYTHROCYTE [DISTWIDTH] IN BLOOD BY AUTOMATED COUNT: 13.5 % (ref 11–15)
FIO2: 50 %
GLOBULIN PLAS-MCNC: 5 G/DL (ref 2.8–4.4)
GLUCOSE BLD-MCNC: 156 MG/DL (ref 70–99)
GLUCOSE BLD-MCNC: 202 MG/DL (ref 70–99)
GLUCOSE BLD-MCNC: 221 MG/DL (ref 70–99)
GLUCOSE BLD-MCNC: 232 MG/DL (ref 70–99)
GLUCOSE BLD-MCNC: 249 MG/DL (ref 70–99)
GLUCOSE BLD-MCNC: 255 MG/DL (ref 70–99)
HAV IGM SER QL: 4.1 MG/DL (ref 1.6–2.6)
HCT VFR BLD AUTO: 51 %
HGB BLD-MCNC: 15.3 G/DL
IMM GRANULOCYTES # BLD AUTO: 0.48 X10(3) UL (ref 0–1)
IMM GRANULOCYTES NFR BLD: 1.3 %
IONIZED CALCIUM: 1.01 MMOL/L (ref 1.12–1.32)
L/M: 25 L/MIN
LACTIC ACID ARTERIAL: 1.6 MMOL/L (ref 0.5–2)
LYMPHOCYTES # BLD AUTO: 1.18 X10(3) UL (ref 1–4)
LYMPHOCYTES NFR BLD AUTO: 3.3 %
M PROTEIN MFR SERPL ELPH: 7.8 G/DL (ref 6.4–8.2)
MCH RBC QN AUTO: 28.9 PG (ref 26–34)
MCHC RBC AUTO-ENTMCNC: 30 G/DL (ref 31–37)
MCV RBC AUTO: 96.4 FL
METHEMOGLOBIN: 0.6 % SAT (ref 0.4–1.5)
MONOCYTES # BLD AUTO: 1.67 X10(3) UL (ref 0.1–1)
MONOCYTES NFR BLD AUTO: 4.6 %
NEUTROPHILS # BLD AUTO: 32.52 X10 (3) UL (ref 1.5–7.7)
NEUTROPHILS # BLD AUTO: 32.52 X10(3) UL (ref 1.5–7.7)
NEUTROPHILS NFR BLD AUTO: 90.5 %
OSMOLALITY SERPL CALC.SUM OF ELEC: 332 MOSM/KG (ref 275–295)
P/F RATIO: 117.5 MMHG
PATIENT TEMPERATURE: 97.7 F
PHOSPHATE SERPL-MCNC: 3.9 MG/DL (ref 2.5–4.9)
PLATELET # BLD AUTO: 350 10(3)UL (ref 150–450)
POTASSIUM BLOOD GAS: 2.5 MMOL/L (ref 3.6–5.1)
POTASSIUM SERPL-SCNC: 3.5 MMOL/L (ref 3.5–5.1)
POTASSIUM SERPL-SCNC: 3.6 MMOL/L (ref 3.5–5.1)
RBC # BLD AUTO: 5.29 X10(6)UL
SODIUM BLOOD GAS: 156 MMOL/L (ref 136–144)
SODIUM SERPL-SCNC: 149 MMOL/L (ref 136–145)
TOTAL HEMOGLOBIN: 13.7 G/DL
TSI SER-ACNC: 3.36 MIU/ML (ref 0.36–3.74)
WBC # BLD AUTO: 35.9 X10(3) UL (ref 4–11)

## 2021-07-22 PROCEDURE — 70450 CT HEAD/BRAIN W/O DYE: CPT | Performed by: NURSE PRACTITIONER

## 2021-07-22 PROCEDURE — 99232 SBSQ HOSP IP/OBS MODERATE 35: CPT | Performed by: SURGERY

## 2021-07-22 PROCEDURE — 99232 SBSQ HOSP IP/OBS MODERATE 35: CPT | Performed by: HOSPITALIST

## 2021-07-22 PROCEDURE — 74177 CT ABD & PELVIS W/CONTRAST: CPT | Performed by: SURGERY

## 2021-07-22 RX ORDER — ALBUTEROL SULFATE 2.5 MG/3ML
2.5 SOLUTION RESPIRATORY (INHALATION)
Status: DISCONTINUED | OUTPATIENT
Start: 2021-07-22 | End: 2021-08-02

## 2021-07-22 RX ORDER — SODIUM CHLORIDE 30 MG/ML INHALATION SOLUTION 30 MG/ML
3 SOLUTION INHALANT
Status: DISCONTINUED | OUTPATIENT
Start: 2021-07-22 | End: 2021-07-28

## 2021-07-22 RX ORDER — SODIUM CHLORIDE 30 MG/ML INHALATION SOLUTION 30 MG/ML
3 SOLUTION INHALANT
Status: DISCONTINUED | OUTPATIENT
Start: 2021-07-22 | End: 2021-07-22

## 2021-07-22 RX ORDER — ALBUTEROL SULFATE 2.5 MG/3ML
2.5 SOLUTION RESPIRATORY (INHALATION)
Status: DISCONTINUED | OUTPATIENT
Start: 2021-07-22 | End: 2021-07-22

## 2021-07-22 NOTE — PROGRESS NOTES
[facility]  Progress Note     Margareth [place] Patient Status:  Inpatient    1955 MRN [id number]   [facility] 3NW-A Attending Alondra Rosas MD   [facility] Day # 3 PCP Rosario Henry MD     Chief Complaint: abd pain    S: Pt cont t GFRNAA 56*   < > 76  --  73  --   --   --  42*   CA 9.0   < > 8.9  --  8.8  --   --   --  8.9   ALB 3.0*  --   --   --  2.7*  --   --   --  2.8*   *   < > 141  --  142  --   --   --  149*   K 3.9   < > 3.1*   < > 2.9*   < > 2.9* 3.6 3.5   CL 92* pregabalin  75 mg Oral Nightly   • tamsulosin HCl  0.4 mg Oral Daily   • traZODone HCl  50 mg Oral Nightly   • Heparin Sodium (Porcine)  5,000 Units Subcutaneous Q8H [address]   • Insulin Aspart Pen  1-10 Units Subcutaneous Q6H   • famoTIDine  20 mg Intravenous

## 2021-07-22 NOTE — CM/SW NOTE
07/22/21 0800   CM/SW Referral Data   Referral Source Social Work (self-referral)   Reason for Referral Discharge planning   Informant Patient   Readmission Assessment   Factors that patient feels contributed to this readmission   (left AMA)   Patient I

## 2021-07-22 NOTE — PROGRESS NOTES
[facility] Pulmonary/Critical Care Progress Note     SUBJECTIVE/Interval history: All events, procedures, notes reviewed. Pt is hungry. He has no other complaints.      Review of Systems:   A comprehensive 14 point revie --  42*   CA 8.9  --  8.8  --   --   --  8.9     --  142  --   --   --  149*   K 3.1*   < > 2.9*   < > 2.9* 3.6 3.5   CL 94*  --  91*  --   --   --  102   CO2 44.0*  --  49.0*  --   --   --  45.0*    < > = values in this interval not displayed. Abhishek Lambert MD on 7/21/2021 at 12:52 PM       XR CHEST AP PORTABLE  (CPT=71045)    Result Date: 7/21/2021  CONCLUSION:  The heart size and vascularity are normal.  There is no CHF.   There is some minimal left basilar discoid atelectasis and trace right basilar exacerbation presently  · History of recurrent small bowel obstruction status post surgery 7/12 now with prolonged ileus evidence of improvement  · Acute renal failure, hypokalemia  · Leukocytosis  · Metabolic alkalosis, fluid shifts  · COPD/asthma hx  · D

## 2021-07-22 NOTE — PROGRESS NOTES
Patient remained on vapotherm overnight. Will continue to monitor. 07/22/21 0332   Oxygen Utilization   $ RT Standby Charge (per 15 min) 1   $ Oxygen in use?  Yes   O2 Device High flow/High humidity   O2 Flow Rate (L/min) 30 L/min   FiO2 (%) 60 %   Hum

## 2021-07-22 NOTE — PLAN OF CARE
Assumed care of patient following shift report. Patient is alert and oriented. OG clamped, seems to be tolerating well. Patient has complaints of pain to abdominal incision, treated with dilaudid.  Initially in the shift, patient requiring higher oxygen nee

## 2021-07-22 NOTE — PROGRESS NOTES
Patient on Vapotherm 40L/100%. Respirations not labored, not tachypneic. SpO2 90-92% at present. Patient to remain on VT for now, though will need BIPAP if develops distress or increased hypoxia. Case reviewed with [facility] on-call, Dr Maureen Mendieta.   Will add

## 2021-07-22 NOTE — CONSULTS
[facility]  Report of Neuropsycholgy Consultation    Carol Sosaandrew Patient Status:  Inpatient    1955 MRN [id number]   [facility] 4SW-A Attending Cory Kessler MD   [facility] Day # 3 PCP Monalisa Arevalo MD         Histor History:   Procedure Laterality Date   • COLON SURGERY  Aug. 2012   • HERNIA SURGERY  6/16/13    Repair Recurrent Vent./Inc Hernia by Dr. Joyce Edward   • OTHER SURGICAL HISTORY  2/2014    (R) Shoulder Replacement   • REPAIR ING HERNIA,5+Y/O,REDUCIBL  Nov. 2012 Daily  •  lisinopril tab 20 mg, 20 mg, Oral, Daily  •  NIFEdipine ER osmotic release (PROCARDIA XL) 24 hr tab 30 mg, 30 mg, Oral, Daily  •  pregabalin (LYRICA) cap 75 mg, 75 mg, Oral, Nightly  •  tamsulosin HCl (FLOMAX) cap 0.4 mg, 0.4 mg, Oral, Daily  • Referral     The patient was referred for a neuropsychological evaluation to assist with differential diagnosis and treatment planning. This is a patient I am familiar with and I did an evaluation in April 2019.   At that time he demonstrated global severe measure of nonverbal logic and reasoning he did not understand the sample portions and was too confused for the simple task.   Diagnostic impressions    Acute cognitive disorder current presentation severe profound  Recommendations    Given the patient has

## 2021-07-22 NOTE — PROGRESS NOTES
[facility]  Progress Note    Herman Cronin Patient Status:  Inpatient    1955 MRN [id number]   [facility] 4SW-A Attending Tedd Lesch, MD   [facility] Day # 3 PCP Davin Villa MD     Subjective:  Patient responds appropriately to q ventral incisional hernia with obstruction     AVN (avascular necrosis of bone) (HCC)     Unspecified internal derangement of knee     Shoulder arthritis     Aseptic necrosis of bone, site unspecified     S/P shoulder surgery-global 2/7/14     Syncope and general treatment was explained to the patient and the family.         DO ELOISE Mckeon General Surgery  7/22/2021  1:17 PM

## 2021-07-23 ENCOUNTER — APPOINTMENT (OUTPATIENT)
Dept: CV DIAGNOSTICS | Facility: HOSPITAL | Age: 66
DRG: 335 | End: 2021-07-23
Attending: HOSPITALIST
Payer: MEDICARE

## 2021-07-23 ENCOUNTER — ANESTHESIA EVENT (OUTPATIENT)
Dept: SURGERY | Facility: HOSPITAL | Age: 66
DRG: 335 | End: 2021-07-23
Payer: MEDICARE

## 2021-07-23 ENCOUNTER — APPOINTMENT (OUTPATIENT)
Dept: GENERAL RADIOLOGY | Facility: HOSPITAL | Age: 66
DRG: 335 | End: 2021-07-23
Attending: NURSE PRACTITIONER
Payer: MEDICARE

## 2021-07-23 ENCOUNTER — APPOINTMENT (OUTPATIENT)
Dept: GENERAL RADIOLOGY | Facility: HOSPITAL | Age: 66
DRG: 335 | End: 2021-07-23
Attending: INTERNAL MEDICINE
Payer: MEDICARE

## 2021-07-23 ENCOUNTER — ANESTHESIA (OUTPATIENT)
Dept: SURGERY | Facility: HOSPITAL | Age: 66
DRG: 335 | End: 2021-07-23
Payer: MEDICARE

## 2021-07-23 LAB
ALBUMIN SERPL-MCNC: 2 G/DL (ref 3.4–5)
ALBUMIN SERPL-MCNC: 2.3 G/DL (ref 3.4–5)
ALBUMIN/GLOB SERPL: 0.5 {RATIO} (ref 1–2)
ALBUMIN/GLOB SERPL: 0.6 {RATIO} (ref 1–2)
ALP LIVER SERPL-CCNC: 92 U/L
ALP LIVER SERPL-CCNC: 95 U/L
ALT SERPL-CCNC: 16 U/L
ALT SERPL-CCNC: 16 U/L
ANION GAP SERPL CALC-SCNC: 4 MMOL/L (ref 0–18)
ANION GAP SERPL CALC-SCNC: 5 MMOL/L (ref 0–18)
ANTIBODY SCREEN: NEGATIVE
ARTERIAL BLD GAS O2 SATURATION: 96 % (ref 92–100)
ARTERIAL BLOOD GAS BASE EXCESS: 6 MMOL/L (ref ?–2)
ARTERIAL BLOOD GAS HCO3: 32.1 MEQ/L (ref 22–26)
ARTERIAL BLOOD GAS PCO2: 53 MM HG (ref 35–45)
ARTERIAL BLOOD GAS PH: 7.39 (ref 7.35–7.45)
ARTERIAL BLOOD GAS PO2: 196 MM HG (ref 80–105)
AST SERPL-CCNC: 22 U/L (ref 15–37)
AST SERPL-CCNC: 24 U/L (ref 15–37)
BASOPHILS # BLD AUTO: 0.03 X10(3) UL (ref 0–0.2)
BASOPHILS # BLD AUTO: 0.03 X10(3) UL (ref 0–0.2)
BASOPHILS NFR BLD AUTO: 0.1 %
BASOPHILS NFR BLD AUTO: 0.1 %
BILIRUB SERPL-MCNC: 1.1 MG/DL (ref 0.1–2)
BILIRUB SERPL-MCNC: 1.6 MG/DL (ref 0.1–2)
BUN BLD-MCNC: 100 MG/DL (ref 7–18)
BUN BLD-MCNC: 91 MG/DL (ref 7–18)
BUN/CREAT SERPL: 31 (ref 10–20)
BUN/CREAT SERPL: 33.3 (ref 10–20)
CALCIUM BLD-MCNC: 7.3 MG/DL (ref 8.5–10.1)
CALCIUM BLD-MCNC: 7.4 MG/DL (ref 8.5–10.1)
CALCULATED O2 SATURATION: 100 % (ref 92–100)
CARBOXYHEMOGLOBIN: 1.9 % SAT (ref 0–3)
CHLORIDE SERPL-SCNC: 110 MMOL/L (ref 98–112)
CHLORIDE SERPL-SCNC: 97 MMOL/L (ref 98–112)
CHLORIDE UR-SCNC: <10 MMOL/L
CO2 SERPL-SCNC: 34 MMOL/L (ref 21–32)
CO2 SERPL-SCNC: 43 MMOL/L (ref 21–32)
CREAT BLD-MCNC: 2.73 MG/DL
CREAT BLD-MCNC: 3.23 MG/DL
DEPRECATED RDW RBC AUTO: 50 FL (ref 35.1–46.3)
DEPRECATED RDW RBC AUTO: 50.4 FL (ref 35.1–46.3)
EOSINOPHIL # BLD AUTO: 0.02 X10(3) UL (ref 0–0.7)
EOSINOPHIL # BLD AUTO: 0.03 X10(3) UL (ref 0–0.7)
EOSINOPHIL NFR BLD AUTO: 0.1 %
EOSINOPHIL NFR BLD AUTO: 0.1 %
ERYTHROCYTE [DISTWIDTH] IN BLOOD BY AUTOMATED COUNT: 13.6 % (ref 11–15)
ERYTHROCYTE [DISTWIDTH] IN BLOOD BY AUTOMATED COUNT: 13.9 % (ref 11–15)
FIO2: 80 %
GLOBULIN PLAS-MCNC: 3.8 G/DL (ref 2.8–4.4)
GLOBULIN PLAS-MCNC: 3.8 G/DL (ref 2.8–4.4)
GLUCOSE BLD-MCNC: 183 MG/DL (ref 70–99)
GLUCOSE BLD-MCNC: 186 MG/DL (ref 70–99)
GLUCOSE BLD-MCNC: 187 MG/DL (ref 70–99)
GLUCOSE BLD-MCNC: 198 MG/DL (ref 70–99)
GLUCOSE BLD-MCNC: 202 MG/DL (ref 70–99)
GLUCOSE BLD-MCNC: 210 MG/DL (ref 70–99)
GLUCOSE BLD-MCNC: 280 MG/DL (ref 70–99)
HAV IGM SER QL: 3.5 MG/DL (ref 1.6–2.6)
HCT VFR BLD AUTO: 32 %
HCT VFR BLD AUTO: 32.6 %
HGB BLD-MCNC: 9.5 G/DL
HGB BLD-MCNC: 9.8 G/DL
IMM GRANULOCYTES # BLD AUTO: 0.31 X10(3) UL (ref 0–1)
IMM GRANULOCYTES # BLD AUTO: 0.38 X10(3) UL (ref 0–1)
IMM GRANULOCYTES NFR BLD: 1.2 %
IMM GRANULOCYTES NFR BLD: 1.4 %
INR BLD: 1.17 (ref 0.89–1.11)
ISTAT BLOOD GAS BASE EXCESS: 11 MMOL/L
ISTAT BLOOD GAS HCO3: 36.5 MEQ/L
ISTAT BLOOD GAS O2 SATURATION: 96 %
ISTAT BLOOD GAS PCO2: 65.1 MMHG
ISTAT BLOOD GAS PH: 7.36
ISTAT BLOOD GAS PO2: 92 MMHG
ISTAT BLOOD GAS TCO2: 38 MMOL/L (ref 22–32)
ISTAT HEMATOCRIT: 24 %
ISTAT IONIZED CALCIUM: 0.82 MMOL/L (ref 1.12–1.32)
ISTAT POTASSIUM: 2.4 MMOL/L (ref 3.6–5.1)
ISTAT SODIUM: 151 MMOL/L (ref 136–145)
LYMPHOCYTES # BLD AUTO: 1.24 X10(3) UL (ref 1–4)
LYMPHOCYTES # BLD AUTO: 1.26 X10(3) UL (ref 1–4)
LYMPHOCYTES NFR BLD AUTO: 4.6 %
LYMPHOCYTES NFR BLD AUTO: 4.9 %
M PROTEIN MFR SERPL ELPH: 5.8 G/DL (ref 6.4–8.2)
M PROTEIN MFR SERPL ELPH: 6.1 G/DL (ref 6.4–8.2)
MCH RBC QN AUTO: 29.6 PG (ref 26–34)
MCH RBC QN AUTO: 29.8 PG (ref 26–34)
MCHC RBC AUTO-ENTMCNC: 29.7 G/DL (ref 31–37)
MCHC RBC AUTO-ENTMCNC: 30.1 G/DL (ref 31–37)
MCV RBC AUTO: 100.3 FL
MCV RBC AUTO: 98.5 FL
METHEMOGLOBIN: 1 % SAT (ref 0.4–1.5)
MONOCYTES # BLD AUTO: 1.13 X10(3) UL (ref 0.1–1)
MONOCYTES # BLD AUTO: 1.49 X10(3) UL (ref 0.1–1)
MONOCYTES NFR BLD AUTO: 4.4 %
MONOCYTES NFR BLD AUTO: 5.6 %
NEUTROPHILS # BLD AUTO: 23.09 X10 (3) UL (ref 1.5–7.7)
NEUTROPHILS # BLD AUTO: 23.09 X10(3) UL (ref 1.5–7.7)
NEUTROPHILS # BLD AUTO: 23.6 X10 (3) UL (ref 1.5–7.7)
NEUTROPHILS # BLD AUTO: 23.6 X10(3) UL (ref 1.5–7.7)
NEUTROPHILS NFR BLD AUTO: 88.2 %
NEUTROPHILS NFR BLD AUTO: 89.3 %
OSMOLALITY SERPL CALC.SUM OF ELEC: 334 MOSM/KG (ref 275–295)
OSMOLALITY SERPL CALC.SUM OF ELEC: 342 MOSM/KG (ref 275–295)
OSMOLALITY SERPL: 342 MOSM/KG (ref 280–300)
OSMOLALITY UR: 398 MOSM/KG (ref 300–1300)
P/F RATIO: 251.7 MMHG
PATIENT TEMPERATURE: 96.8 F
PEEP: 5 CM H2O
PHOSPHATE SERPL-MCNC: 7.1 MG/DL (ref 2.5–4.9)
PLATELET # BLD AUTO: 292 10(3)UL (ref 150–450)
PLATELET # BLD AUTO: 299 10(3)UL (ref 150–450)
POTASSIUM SERPL-SCNC: 2.7 MMOL/L (ref 3.5–5.1)
POTASSIUM SERPL-SCNC: 3 MMOL/L (ref 3.5–5.1)
PSA SERPL DL<=0.01 NG/ML-MCNC: 15.3 SECONDS (ref 12.4–14.6)
RBC # BLD AUTO: 3.19 X10(6)UL
RBC # BLD AUTO: 3.31 X10(6)UL
RH BLOOD TYPE: POSITIVE
SODIUM SERPL-SCNC: 144 MMOL/L (ref 136–145)
SODIUM SERPL-SCNC: 149 MMOL/L (ref 136–145)
SODIUM SERPL-SCNC: <5 MMOL/L
TIDAL VOLUME: 500 ML
TOTAL HEMOGLOBIN: 9.3 G/DL
TRIGL SERPL-MCNC: 304 MG/DL (ref 30–149)
VENT RATE: 14 /MIN
WBC # BLD AUTO: 25.9 X10(3) UL (ref 4–11)
WBC # BLD AUTO: 26.8 X10(3) UL (ref 4–11)

## 2021-07-23 PROCEDURE — 0DN80ZZ RELEASE SMALL INTESTINE, OPEN APPROACH: ICD-10-PCS | Performed by: SURGERY

## 2021-07-23 PROCEDURE — 3077F SYST BP >= 140 MM HG: CPT | Performed by: HOSPITALIST

## 2021-07-23 PROCEDURE — 99233 SBSQ HOSP IP/OBS HIGH 50: CPT | Performed by: HOSPITALIST

## 2021-07-23 PROCEDURE — 71045 X-RAY EXAM CHEST 1 VIEW: CPT | Performed by: INTERNAL MEDICINE

## 2021-07-23 PROCEDURE — 76942 ECHO GUIDE FOR BIOPSY: CPT | Performed by: ANESTHESIOLOGY

## 2021-07-23 PROCEDURE — 93306 TTE W/DOPPLER COMPLETE: CPT | Performed by: HOSPITALIST

## 2021-07-23 PROCEDURE — 99291 CRITICAL CARE FIRST HOUR: CPT | Performed by: INTERNAL MEDICINE

## 2021-07-23 PROCEDURE — 0WQF0ZZ REPAIR ABDOMINAL WALL, OPEN APPROACH: ICD-10-PCS | Performed by: SURGERY

## 2021-07-23 PROCEDURE — 3078F DIAST BP <80 MM HG: CPT | Performed by: HOSPITALIST

## 2021-07-23 PROCEDURE — 71045 X-RAY EXAM CHEST 1 VIEW: CPT | Performed by: NURSE PRACTITIONER

## 2021-07-23 RX ORDER — HYDROCODONE BITARTRATE AND ACETAMINOPHEN 5; 325 MG/1; MG/1
2 TABLET ORAL AS NEEDED
Status: CANCELLED | OUTPATIENT
Start: 2021-07-23

## 2021-07-23 RX ORDER — ALBUMIN, HUMAN INJ 5% 5 %
500 SOLUTION INTRAVENOUS ONCE
Status: COMPLETED | OUTPATIENT
Start: 2021-07-23 | End: 2021-07-23

## 2021-07-23 RX ORDER — MEPERIDINE HYDROCHLORIDE 25 MG/ML
12.5 INJECTION INTRAMUSCULAR; INTRAVENOUS; SUBCUTANEOUS AS NEEDED
Status: CANCELLED | OUTPATIENT
Start: 2021-07-23

## 2021-07-23 RX ORDER — ROCURONIUM BROMIDE 10 MG/ML
INJECTION, SOLUTION INTRAVENOUS AS NEEDED
Status: DISCONTINUED | OUTPATIENT
Start: 2021-07-23 | End: 2021-07-23 | Stop reason: SURG

## 2021-07-23 RX ORDER — HYDROMORPHONE HYDROCHLORIDE 1 MG/ML
0.4 INJECTION, SOLUTION INTRAMUSCULAR; INTRAVENOUS; SUBCUTANEOUS EVERY 5 MIN PRN
Status: CANCELLED | OUTPATIENT
Start: 2021-07-23 | End: 2021-07-24

## 2021-07-23 RX ORDER — HYDROCODONE BITARTRATE AND ACETAMINOPHEN 5; 325 MG/1; MG/1
1 TABLET ORAL AS NEEDED
Status: CANCELLED | OUTPATIENT
Start: 2021-07-23

## 2021-07-23 RX ORDER — ETOMIDATE 2 MG/ML
INJECTION INTRAVENOUS AS NEEDED
Status: DISCONTINUED | OUTPATIENT
Start: 2021-07-23 | End: 2021-07-23 | Stop reason: SURG

## 2021-07-23 RX ORDER — METOCLOPRAMIDE HYDROCHLORIDE 5 MG/ML
10 INJECTION INTRAMUSCULAR; INTRAVENOUS AS NEEDED
Status: CANCELLED | OUTPATIENT
Start: 2021-07-23 | End: 2021-07-24

## 2021-07-23 RX ORDER — ACETAMINOPHEN 500 MG
1000 TABLET ORAL ONCE AS NEEDED
Status: CANCELLED | OUTPATIENT
Start: 2021-07-23 | End: 2021-07-23

## 2021-07-23 RX ORDER — PHENYLEPHRINE HCL 10 MG/ML
VIAL (ML) INJECTION AS NEEDED
Status: DISCONTINUED | OUTPATIENT
Start: 2021-07-23 | End: 2021-07-23 | Stop reason: SURG

## 2021-07-23 RX ORDER — NALOXONE HYDROCHLORIDE 0.4 MG/ML
80 INJECTION, SOLUTION INTRAMUSCULAR; INTRAVENOUS; SUBCUTANEOUS AS NEEDED
Status: DISCONTINUED | OUTPATIENT
Start: 2021-07-23 | End: 2021-07-24

## 2021-07-23 RX ORDER — ALBUMIN, HUMAN INJ 5% 5 %
250 SOLUTION INTRAVENOUS ONCE
Status: COMPLETED | OUTPATIENT
Start: 2021-07-23 | End: 2021-07-23

## 2021-07-23 RX ORDER — ONDANSETRON 2 MG/ML
4 INJECTION INTRAMUSCULAR; INTRAVENOUS AS NEEDED
Status: CANCELLED | OUTPATIENT
Start: 2021-07-23 | End: 2021-07-24

## 2021-07-23 RX ORDER — LABETALOL HYDROCHLORIDE 5 MG/ML
5 INJECTION, SOLUTION INTRAVENOUS EVERY 5 MIN PRN
Status: CANCELLED | OUTPATIENT
Start: 2021-07-23 | End: 2021-07-24

## 2021-07-23 RX ORDER — MAGNESIUM SULFATE HEPTAHYDRATE 500 MG/ML
INJECTION, SOLUTION INTRAMUSCULAR; INTRAVENOUS AS NEEDED
Status: DISCONTINUED | OUTPATIENT
Start: 2021-07-23 | End: 2021-07-23 | Stop reason: SURG

## 2021-07-23 RX ORDER — ALBUMIN, HUMAN INJ 5% 5 %
SOLUTION INTRAVENOUS
Status: COMPLETED
Start: 2021-07-23 | End: 2021-07-23

## 2021-07-23 RX ORDER — KETAMINE HYDROCHLORIDE 50 MG/ML
INJECTION, SOLUTION, CONCENTRATE INTRAMUSCULAR; INTRAVENOUS AS NEEDED
Status: DISCONTINUED | OUTPATIENT
Start: 2021-07-23 | End: 2021-07-23 | Stop reason: SURG

## 2021-07-23 RX ORDER — METRONIDAZOLE 500 MG/100ML
500 INJECTION, SOLUTION INTRAVENOUS ONCE
Status: COMPLETED | OUTPATIENT
Start: 2021-07-23 | End: 2021-07-23

## 2021-07-23 RX ORDER — METOCLOPRAMIDE HYDROCHLORIDE 5 MG/ML
5 INJECTION INTRAMUSCULAR; INTRAVENOUS EVERY 6 HOURS SCHEDULED
Status: DISCONTINUED | OUTPATIENT
Start: 2021-07-23 | End: 2021-07-24

## 2021-07-23 RX ORDER — SODIUM CHLORIDE, SODIUM LACTATE, POTASSIUM CHLORIDE, CALCIUM CHLORIDE 600; 310; 30; 20 MG/100ML; MG/100ML; MG/100ML; MG/100ML
INJECTION, SOLUTION INTRAVENOUS CONTINUOUS
Status: DISCONTINUED | OUTPATIENT
Start: 2021-07-23 | End: 2021-07-23

## 2021-07-23 RX ORDER — DEXMEDETOMIDINE HYDROCHLORIDE 4 UG/ML
INJECTION, SOLUTION INTRAVENOUS CONTINUOUS
Status: DISCONTINUED | OUTPATIENT
Start: 2021-07-23 | End: 2021-07-27

## 2021-07-23 RX ORDER — SODIUM CHLORIDE, SODIUM LACTATE, POTASSIUM CHLORIDE, CALCIUM CHLORIDE 600; 310; 30; 20 MG/100ML; MG/100ML; MG/100ML; MG/100ML
INJECTION, SOLUTION INTRAVENOUS CONTINUOUS
Status: CANCELLED | OUTPATIENT
Start: 2021-07-23

## 2021-07-23 RX ORDER — MIDAZOLAM HYDROCHLORIDE 1 MG/ML
1 INJECTION INTRAMUSCULAR; INTRAVENOUS EVERY 5 MIN PRN
Status: CANCELLED | OUTPATIENT
Start: 2021-07-23 | End: 2021-07-24

## 2021-07-23 RX ORDER — NALOXONE HYDROCHLORIDE 0.4 MG/ML
80 INJECTION, SOLUTION INTRAMUSCULAR; INTRAVENOUS; SUBCUTANEOUS AS NEEDED
Status: CANCELLED | OUTPATIENT
Start: 2021-07-23 | End: 2021-07-24

## 2021-07-23 RX ORDER — DEXTROSE MONOHYDRATE 25 G/50ML
50 INJECTION, SOLUTION INTRAVENOUS
Status: CANCELLED | OUTPATIENT
Start: 2021-07-23

## 2021-07-23 RX ORDER — CHLORHEXIDINE GLUCONATE 0.12 MG/ML
15 RINSE ORAL
Status: DISCONTINUED | OUTPATIENT
Start: 2021-07-23 | End: 2021-07-25

## 2021-07-23 RX ORDER — SODIUM CHLORIDE 450 MG/100ML
INJECTION, SOLUTION INTRAVENOUS CONTINUOUS
Status: DISCONTINUED | OUTPATIENT
Start: 2021-07-23 | End: 2021-07-24

## 2021-07-23 RX ADMIN — KETAMINE HYDROCHLORIDE 100 MG: 50 INJECTION, SOLUTION, CONCENTRATE INTRAMUSCULAR; INTRAVENOUS at 16:35:00

## 2021-07-23 RX ADMIN — METRONIDAZOLE 500 MG: 500 INJECTION, SOLUTION INTRAVENOUS at 16:38:00

## 2021-07-23 RX ADMIN — PHENYLEPHRINE HCL 100 MCG: 10 MG/ML VIAL (ML) INJECTION at 16:46:00

## 2021-07-23 RX ADMIN — ROCURONIUM BROMIDE 40 MG: 10 INJECTION, SOLUTION INTRAVENOUS at 16:35:00

## 2021-07-23 RX ADMIN — ROCURONIUM BROMIDE 30 MG: 10 INJECTION, SOLUTION INTRAVENOUS at 17:51:00

## 2021-07-23 RX ADMIN — SODIUM CHLORIDE: 9 INJECTION, SOLUTION INTRAVENOUS at 16:34:00

## 2021-07-23 RX ADMIN — ETOMIDATE 22 MG: 2 INJECTION INTRAVENOUS at 16:35:00

## 2021-07-23 RX ADMIN — PHENYLEPHRINE HCL 100 MCG: 10 MG/ML VIAL (ML) INJECTION at 16:43:00

## 2021-07-23 RX ADMIN — ROCURONIUM BROMIDE 20 MG: 10 INJECTION, SOLUTION INTRAVENOUS at 17:22:00

## 2021-07-23 RX ADMIN — MAGNESIUM SULFATE HEPTAHYDRATE 1 G: 500 INJECTION, SOLUTION INTRAMUSCULAR; INTRAVENOUS at 17:45:00

## 2021-07-23 RX ADMIN — SODIUM CHLORIDE: 9 INJECTION, SOLUTION INTRAVENOUS at 17:15:00

## 2021-07-23 RX ADMIN — SODIUM CHLORIDE: 9 INJECTION, SOLUTION INTRAVENOUS at 18:22:00

## 2021-07-23 NOTE — PLAN OF CARE
Rec'd report from previous RN, care assumed at [phone], pt assessed per flow sheets. Pt alert, oriented to person and place only. Pt remains on 15L high flow nasal cannula, occasionally pulling out cannula, and will desat to ~83%.   Pt's lungs clear and dimi

## 2021-07-23 NOTE — PLAN OF CARE
Received and assumed care of pt at 0730. Drowsy, alert and oriented to self and place. Disoriented to time and situation. Pt moans out that he wants to go home. Appears confused and restless. Follows commands. Afebrile.  Able to wean vapo to 15L high flow N

## 2021-07-23 NOTE — PROGRESS NOTES
Pharmacy Note:  Renal Dose Adjustment for Metoclopramide (REGLAN)         Lola Calderon has been prescribed metoclopramide 10 mg q6hr.     Est CrCl: ~30 mL/min (worsening MARIPOSA)    His calculated creatinine clearance is < 40 mL/min, therefore the dose of me

## 2021-07-23 NOTE — PLAN OF CARE
Assumed care for this pt at [address] [place] [address]. Pt alert 1-2, on 15L high flow nasal cannula sates upper 90's will start to wean. Pt incontinent at times, vss, abdominal binder on staples underneath open to air incision well approximated, non drainage.  Pt pain managed b

## 2021-07-23 NOTE — ANESTHESIA PROCEDURE NOTES
Arterial Line  Performed by: Jenny Asher MD  Authorized by: Jenny Asher MD     General Information and Staff    Procedure Start:  7/23/2021 4:55 PM  Procedure End:  7/23/2021 5:00 PM  Anesthesiologist:  Jenny Asher MD  Performed By:  An

## 2021-07-23 NOTE — PROGRESS NOTES
Pt Arrived to unit.  ETT secure and pt ventilating.      07/23/21 1820   Vent Information   $ RT Standby Charge (per 15 min) 1   $ Vent Care / Non-Invasive Initial Day Yes   Ventilator Initiation 07/23/21   Ventilation Day(s) 1   Interface Invasive   Vent T

## 2021-07-23 NOTE — CM/SW NOTE
Called pt's wife regarding pt. She stated when the pt is at home she feels she can manage the pt and help take care of him. She stated neither of them drive, but neighbors assist w/transport.  She also stated she does not know the name of the pt's [facility] agency

## 2021-07-23 NOTE — CONSULTS
[facility]  Report of Consultation    Daniela Núñez Patient Status:  Inpatient    1955 MRN [id number]   [facility] 4SW-A Attending Blanca Dillard MD   [facility] Day # 4 PCP Rosalind Ray MD     Reason for Consultation:  MARIPOSA/oliguria •  cefTRIAXone Sodium (ROCEPHIN) 2 g in sodium chloride 0.9% 100 mL IVPB-ADDV, 2 g, Intravenous, Once  •  metRONIDAZOLE in NaCl (FLAGYL) IVPB premix 500 mg, 500 mg, Intravenous, Once  •  Metoclopramide HCl (REGLAN) injection 5 mg, 5 mg, Intravenous, 4 ti tablet, Oral, Q15 Min PRN  •  0.9% NaCl infusion, , Intravenous, Continuous  •  Heparin Sodium (Porcine) 5000 UNIT/ML injection 5,000 Units, 5,000 Units, Subcutaneous, Q8H [facility]  •  HYDROmorphone HCl (DILAUDID) 1 MG/ML injection 0.2 mg, 0.2 mg, Intravenous, R foot  Neurologic: moving all extremities  Skin: Warm and dry, no rashes      Laboratory Data:  Lab Results   Component Value Date    WBC 26.8 07/23/2021    HGB 9.8 07/23/2021    HCT 32.6 07/23/2021    .0 07/23/2021    CREATSERUM 3.23 07/23/2021 3.7  --   --  3.9 7.1*  --    *  --  127*  --  198*  --   --  232* 186*  --     < > = values in this interval not displayed.        Recent Labs   Lab 07/19/21  0729 07/21/21  0511 07/22/21  0634 07/23/21  0921 07/23/21  1213   ALT 24 21 18 16  --

## 2021-07-23 NOTE — PROGRESS NOTES
[facility]  Progress Note    Syed Love Patient Status:  Inpatient    1955 MRN [id number]   [facility] 4SW-A Attending Andrea Spring MD   [facility] Day # 4 PCP Latonia Sommers MD     Subjective:  Patient is confused.   He is in no a findings of at least partial small bowel obstruction with transition point noted within a fat and bowel-containing ventral hernia.     Assessment  Patient Active Problem List:     Shoulder pain     Recurrent ventral incisional hernia with obstruction     AV time with this patient was 22 minutes. Greater than half of our visit was spent in counseling the patient on the above listed diagnoses and treatment options.     Eliecer Villalta  7/23/2021  1:07 PM  Addendum:  Dr. Nelly Doty       I have reviewed the above listed

## 2021-07-23 NOTE — PROGRESS NOTES
[facility] Pulmonary/Critical Care Progress Note     SUBJECTIVE/Interval history: All events, procedures, notes reviewed. Pt is agitated and wants \"creamer for my coffee\". He denies any other complaints.        Review 73  --   --  42* 19*  --    CA 8.8  --   --  8.9 7.3*  --      --   --  149* 144  --    K 2.9*   < > 3.6 3.5  --  3.0*   CL 91*  --   --  102 97*  --    CO2 49.0*  --   --  45.0* 43.0*  --     < > = values in this interval not displayed.      Recent L right lower lobe airspace disease which may be any combination of atelectasis and or pneumonia. 3. Details as above. Continued clinical correlation recommended.      Dictated by (CST): Carmelo Santana MD on 7/21/2021 at 12:47 PM     Finalized by (CST): Prosper Cedillo amphetamine-dextroamphetamine  30 mg Oral BID   • hydrALAzine HCl  50 mg Oral Daily   • Umeclidinium Bromide  1 puff Inhalation Daily   • lidocaine-menthol  1 patch Transdermal Daily   • NIFEdipine ER osmotic release  30 mg Oral Daily   • pregabalin  75 mg

## 2021-07-23 NOTE — ANESTHESIA PREPROCEDURE EVALUATION
PRE-OP EVALUATION    Patient Name: Nedra Seats    Admit Diagnosis: Small bowel obstruction ([facility] [address] [E85.103]    Pre-op Diagnosis: Bowel obstruction ([facility] [address] [X13.069]    EXPLORATORY LAPAROTOMY POSSIBLE SMALL BOWEL RESECTION    Anesthesia Procedure: EXPLORATOR Intravenous, Q4H PRN  Piperacillin Sod-Tazobactam So (ZOSYN) 4.5 g in dextrose 5% 100 mL IVPB-ADDV, 4.5 g, Intravenous, Q8H  [COMPLETED] acetaZOLAMIDE sodium (DIAMOX) 500 mg in Sterile Water for Injection 5 mL IV push, 500 mg, Intravenous, BID  [COMPLETED] PRN   Or  HYDROmorphone HCl (DILAUDID) 1 MG/ML injection 0.8 mg, 0.8 mg, Intravenous, Q2H PRN  ondansetron HCl (ZOFRAN) injection 4 mg, 4 mg, Intravenous, Q6H PRN  Insulin Aspart Pen (NOVOLOG) 100 UNIT/ML flexpen 1-10 Units, 1-10 Units, Subcutaneous, Q6H , Past Week at Unknown time  insulin detemir 100 UNIT/ML Subcutaneous Solution, Inject 40 Units into the skin nightly., Disp: , Rfl: , Past Week at Unknown time  Insulin Lispro 100 UNIT/ML Subcutaneous Solution, Inject 20 Units into the skin TID PC., Disp: Endo/Other      (+) diabetes  type 2,                          Pulmonary  Comment: Hypoxia on HFNC- CT chest with mucus plug, possible aspiration, no PE on imaging    (+) asthma                     Neuro/Psych  Comment: Confused, nondecis including PONV, sore throat, dental damage, cardiac and respiratory complications. Discussed possible TAP blocks and risks including bleeding, infection, damage to surrounding structures.  Discussed possibility of postoperative intubation given current O2 r

## 2021-07-23 NOTE — ANESTHESIA POSTPROCEDURE EVALUATION
[id number] [facility] Patient Status:  Inpatient   Age/Gender 61year old male MRN [id number]   [facility] 4SW-A Attending Yamel Montiel MD   [facility] Day # 4 PCP Karan Gu MD       Anesthesia Post-op Note    Letty Diaz

## 2021-07-23 NOTE — ANESTHESIA PROCEDURE NOTES
Airway  Date/Time: 7/23/2021 4:37 PM  Urgency: elective    Airway not difficult    General Information and Staff    Patient location during procedure: OR  Anesthesiologist: Bhakti Cornell MD  Performed: anesthesiologist     Indications and Patient Condi

## 2021-07-23 NOTE — PROGRESS NOTES
[facility]  Progress Note     Lidia Copper Patient Status:  Inpatient    1955 MRN [id number]   [facility] 3NW-A Attending Sangeeta Davis MD   [facility] Day # 4 PCP Joshua Edward MD     Chief Complaint: abd pain    S: Pt cont t GFRNAA 56*   < > 73  --   --   --  42* 19*   CA 9.0   < > 8.8  --   --   --  8.9 7.3*   ALB 3.0*  --  2.7*  --   --   --  2.8* 2.3*   *   < > 142  --   --   --  149* 144   K 3.9   < > 2.9*   < > 2.9* 3.6 3.5  --    CL 92*   < > 91*  --   --   --  1 (Porcine)  5,000 Units Subcutaneous Q8H [address]   • Insulin Aspart Pen  1-10 Units Subcutaneous Q6H   • famoTIDine  20 mg Intravenous Daily       ASSESSMENT / PLAN:     1. Small Bowel Obstruction vs Ileus  1. Surgery following  2. IVF  3. Pain control  4.  NG c home    Plan of care discussed with patient, Aslhey Barraza MD

## 2021-07-23 NOTE — PROGRESS NOTES
Pt A0x2. Confused and forgetful at times. Reorientation completed frequently. Maintaining 02 sats on 5L via HFNC. NSR on nabil. BP borderline at times. Afebrile. NGT w/ brown stool smelling output. ABD rounded. Nausea this AM. Unable to void.  Jones cath olga

## 2021-07-24 ENCOUNTER — APPOINTMENT (OUTPATIENT)
Dept: GENERAL RADIOLOGY | Facility: HOSPITAL | Age: 66
DRG: 335 | End: 2021-07-24
Attending: NURSE PRACTITIONER
Payer: MEDICARE

## 2021-07-24 LAB
ALBUMIN SERPL-MCNC: 2.6 G/DL (ref 3.4–5)
ALBUMIN/GLOB SERPL: 0.8 {RATIO} (ref 1–2)
ALP LIVER SERPL-CCNC: 79 U/L
ALT SERPL-CCNC: 15 U/L
ANION GAP SERPL CALC-SCNC: 1 MMOL/L (ref 0–18)
ARTERIAL BLD GAS O2 SATURATION: 93 % (ref 92–100)
ARTERIAL BLOOD GAS BASE EXCESS: 5.2 MMOL/L (ref ?–2)
ARTERIAL BLOOD GAS HCO3: 30.4 MEQ/L (ref 22–26)
ARTERIAL BLOOD GAS PCO2: 50 MM HG (ref 35–45)
ARTERIAL BLOOD GAS PH: 7.4 (ref 7.35–7.45)
ARTERIAL BLOOD GAS PO2: 87 MM HG (ref 80–105)
AST SERPL-CCNC: 18 U/L (ref 15–37)
BASOPHILS # BLD AUTO: 0.02 X10(3) UL (ref 0–0.2)
BASOPHILS NFR BLD AUTO: 0.1 %
BILIRUB SERPL-MCNC: 1.4 MG/DL (ref 0.1–2)
BUN BLD-MCNC: 75 MG/DL (ref 7–18)
BUN/CREAT SERPL: 34.6 (ref 10–20)
CALCIUM BLD-MCNC: 6.9 MG/DL (ref 8.5–10.1)
CALCULATED O2 SATURATION: 97 % (ref 92–100)
CARBOXYHEMOGLOBIN: 2.8 % SAT (ref 0–3)
CHLORIDE SERPL-SCNC: 114 MMOL/L (ref 98–112)
CO2 SERPL-SCNC: 34 MMOL/L (ref 21–32)
CREAT BLD-MCNC: 2.17 MG/DL
DEPRECATED RDW RBC AUTO: 51.7 FL (ref 35.1–46.3)
EOSINOPHIL # BLD AUTO: 0.06 X10(3) UL (ref 0–0.7)
EOSINOPHIL NFR BLD AUTO: 0.3 %
ERYTHROCYTE [DISTWIDTH] IN BLOOD BY AUTOMATED COUNT: 13.8 % (ref 11–15)
FIO2: 40 %
GLOBULIN PLAS-MCNC: 3.1 G/DL (ref 2.8–4.4)
GLUCOSE BLD-MCNC: 103 MG/DL (ref 70–99)
GLUCOSE BLD-MCNC: 138 MG/DL (ref 70–99)
GLUCOSE BLD-MCNC: 181 MG/DL (ref 70–99)
GLUCOSE BLD-MCNC: 216 MG/DL (ref 70–99)
GLUCOSE BLD-MCNC: 223 MG/DL (ref 70–99)
HAV IGM SER QL: 3.1 MG/DL (ref 1.6–2.6)
HCT VFR BLD AUTO: 25.4 %
HGB BLD-MCNC: 7.4 G/DL
IMM GRANULOCYTES # BLD AUTO: 0.18 X10(3) UL (ref 0–1)
IMM GRANULOCYTES NFR BLD: 0.8 %
IONIZED CALCIUM: 1.05 MMOL/L (ref 1.12–1.32)
LACTIC ACID ARTERIAL: <1.6 MMOL/L (ref 0.5–2)
LYMPHOCYTES # BLD AUTO: 1.24 X10(3) UL (ref 1–4)
LYMPHOCYTES NFR BLD AUTO: 5.7 %
M PROTEIN MFR SERPL ELPH: 5.7 G/DL (ref 6.4–8.2)
MCH RBC QN AUTO: 29.6 PG (ref 26–34)
MCHC RBC AUTO-ENTMCNC: 29.1 G/DL (ref 31–37)
MCV RBC AUTO: 101.6 FL
METHEMOGLOBIN: 0.9 % SAT (ref 0.4–1.5)
MONOCYTES # BLD AUTO: 0.71 X10(3) UL (ref 0.1–1)
MONOCYTES NFR BLD AUTO: 3.2 %
NEUTROPHILS # BLD AUTO: 19.69 X10 (3) UL (ref 1.5–7.7)
NEUTROPHILS # BLD AUTO: 19.69 X10(3) UL (ref 1.5–7.7)
NEUTROPHILS NFR BLD AUTO: 89.9 %
OSMOLALITY SERPL CALC.SUM OF ELEC: 337 MOSM/KG (ref 275–295)
PATIENT TEMPERATURE: 99.1 F
PEEP: 5 CM H2O
PHOSPHATE SERPL-MCNC: 2.4 MG/DL (ref 2.5–4.9)
PLATELET # BLD AUTO: 256 10(3)UL (ref 150–450)
POTASSIUM BLOOD GAS: 2.8 MMOL/L (ref 3.6–5.1)
POTASSIUM SERPL-SCNC: 2.7 MMOL/L (ref 3.5–5.1)
RBC # BLD AUTO: 2.5 X10(6)UL
SODIUM BLOOD GAS: 151 MMOL/L (ref 136–144)
SODIUM SERPL-SCNC: 149 MMOL/L (ref 136–145)
TIDAL VOLUME: 500 ML
TOTAL HEMOGLOBIN: 7.3 G/DL
VENT RATE: 14 /MIN
WBC # BLD AUTO: 21.9 X10(3) UL (ref 4–11)

## 2021-07-24 PROCEDURE — 05H633Z INSERTION OF INFUSION DEVICE INTO LEFT SUBCLAVIAN VEIN, PERCUTANEOUS APPROACH: ICD-10-PCS | Performed by: INTERNAL MEDICINE

## 2021-07-24 PROCEDURE — B548ZZA ULTRASONOGRAPHY OF SUPERIOR VENA CAVA, GUIDANCE: ICD-10-PCS | Performed by: INTERNAL MEDICINE

## 2021-07-24 PROCEDURE — 71045 X-RAY EXAM CHEST 1 VIEW: CPT | Performed by: NURSE PRACTITIONER

## 2021-07-24 PROCEDURE — B547ZZA ULTRASONOGRAPHY OF LEFT SUBCLAVIAN VEIN, GUIDANCE: ICD-10-PCS | Performed by: INTERNAL MEDICINE

## 2021-07-24 PROCEDURE — 3E0336Z INTRODUCTION OF NUTRITIONAL SUBSTANCE INTO PERIPHERAL VEIN, PERCUTANEOUS APPROACH: ICD-10-PCS | Performed by: INTERNAL MEDICINE

## 2021-07-24 PROCEDURE — 02HV33Z INSERTION OF INFUSION DEVICE INTO SUPERIOR VENA CAVA, PERCUTANEOUS APPROACH: ICD-10-PCS | Performed by: INTERNAL MEDICINE

## 2021-07-24 PROCEDURE — 99233 SBSQ HOSP IP/OBS HIGH 50: CPT | Performed by: HOSPITALIST

## 2021-07-24 PROCEDURE — 99291 CRITICAL CARE FIRST HOUR: CPT | Performed by: INTERNAL MEDICINE

## 2021-07-24 RX ORDER — METOCLOPRAMIDE HYDROCHLORIDE 5 MG/ML
10 INJECTION INTRAMUSCULAR; INTRAVENOUS EVERY 6 HOURS SCHEDULED
Status: DISCONTINUED | OUTPATIENT
Start: 2021-07-24 | End: 2021-07-25

## 2021-07-24 RX ORDER — LIDOCAINE HYDROCHLORIDE 10 MG/ML
5 INJECTION, SOLUTION EPIDURAL; INFILTRATION; INTRACAUDAL; PERINEURAL ONCE
Status: DISCONTINUED | OUTPATIENT
Start: 2021-07-24 | End: 2021-07-30 | Stop reason: ALTCHOICE

## 2021-07-24 RX ORDER — ALBUMIN, HUMAN INJ 5% 5 %
500 SOLUTION INTRAVENOUS ONCE
Status: COMPLETED | OUTPATIENT
Start: 2021-07-24 | End: 2021-07-24

## 2021-07-24 NOTE — PLAN OF CARE
Received pt at 0730 intubated and sedated on fentanyl, precedex and levo and vaso. On 40% Fi02. Vent compliance adequate. Unable to follow commands. Pupils equal and reactive. Movement to painful stimuli in all extremities. Vaso weaned.  Levo titrated for M

## 2021-07-24 NOTE — PLAN OF CARE
Received sedated on ventilator s/p exp lap, jim. Patient will open eyes, move all extremities but not follow commands. Restless and agitated. Attempting to pull at ett. Soft wrist restraints continued.  Sedation titrated to help patient be calm and tolerate

## 2021-07-24 NOTE — PROGRESS NOTES
Pharmacy Note:  Renal Dose Adjustment for Metoclopramide (REGLAN)         Nedra Stewart has been prescribed metoclopramide 5mg q6hr.     Est CrCl: ~42 mL/min (MARIPOSA - improving)    His calculated creatinine clearance is > 40 mL/min, therefore the dose of me

## 2021-07-24 NOTE — PROGRESS NOTES
[facility]  Progress Note    John Posada Patient Status:  Inpatient    1955 MRN [id number]   [facility] 4SW-A Attending Gaudencio Cormier MD   [address] Day # 5 PCP Vidhya Tapia MD     Subjective:   The patient is intubated and sedate serosanguineous drainage.

## 2021-07-24 NOTE — PROGRESS NOTES
Received pt on VC+ 14/500/80%/+5. ABG done. FiO2 weaned to 40% throughout the night. ETT advanced to [email or web address] per chest x-ray. Nebs given QID with TID nacl 3% and BID pulmicort via metaneb. CPT done QID. Will continue to monitor.       07/24/21 0300   Vent In

## 2021-07-24 NOTE — PROGRESS NOTES
[facility]  Progress Note     Vibha Come Patient Status:  Inpatient    1955 MRN [id number]   [facility] 3NW-A Attending Hussain Gautam MD   [facility] Day # 5 PCP Emmanuel Duncan MD     Chief Complaint: abd pain    S: sp ex lap 07/22/21  0634 07/23/21  0921 07/23/21  1213 07/23/21  1812 07/24/21  0430   GLU  --  186*  --  202* 216*   BUN  --  100*  --  91* 75*   CREATSERUM  --  3.23*  --  2.73* 2.17*   GFRAA  --  22*  --  27* 36*   GFRNAA  --  19*  --  24* 31*   CA  --  7.3*  -- • Heparin Sodium (Porcine)  5,000 Units Subcutaneous Q8H [address]   • Insulin Aspart Pen  1-10 Units Subcutaneous Q6H   • famoTIDine  20 mg Intravenous Daily       ASSESSMENT / PLAN:     1. Small Bowel Obstruction  1. Surgery following  2. IVF  3.  Pain contro

## 2021-07-24 NOTE — DIETARY NOTE
[facility]    NUTRITION ASSESSMENT    Pt does not meet malnutrition criteria at this time. NUTRITION INTERVENTION:     1. Meal and Snacks - diet advancement per Surgery and SLP, when appropriate.     2. Parenteral Nutrition - via PICC line    *Initi No  Cultural/Ethnic/Zoroastrian Preferences Addresses: Yes    GI SYSTEM REVIEW: SBO    NUTRITION RELATED PHYSICAL FINDINGS:     1. Body Fat/Muscle Mass: not assessed at this time    NUTRITION PRESCRIPTION: 89.1 kg-196 lb (7/24)  Calories: 2024 calories/day (

## 2021-07-24 NOTE — PROGRESS NOTES
[facility] Pulmonary/Critical Care Progress Note     SUBJECTIVE/Interval history: All events, procedures, notes reviewed. pt s post op small bowel resection intubated and sedated on vent.        Review of Systems:   A com GFRAA  --  22*  --  27* 36*   GFRNAA  --  19*  --  24* 31*   CA  --  7.3*  --  7.4* 6.9*   NA  --  144  --  149* 149*   K   < >  --  3.0* 2.7* 2.7*   CL  --  97*  --  110 114*   CO2  --  43.0*  --  34.0* 34.0*    < > = values in this interval not display associated right lower lobe airspace disease which may be any combination of atelectasis and or pneumonia. 3. Details as above. Continued clinical correlation recommended.      Dictated by (CST): Makenzie Desir MD on 7/21/2021 at 12:47 PM     Finalized by (CS Hannah Brasher MD on 7/23/2021 at 6:53 PM     Finalized by (CST): Hannah Brasher MD on 7/23/2021 at 6:55 PM         • potassium chloride  40 mEq Intravenous Once   • Metoclopramide HCl  10 mg Intravenous 4 times per day   • Chlorhexidine Gluconate  15 shunt and preserved ef. cxr with no infiltrate and et tube good position, reviwed by me  · On levophed 12 mcg, vasopressin and dex.  Wean dex and stop vasopressin then wean levophed to keep map >60  · Check sbt  · chemo improving with ivf  · On zosyn post op

## 2021-07-24 NOTE — PROGRESS NOTES
[facility]  Nephrology Progress Note    [address] Attending:  Abdirizak Rubalcava MD       Assessment and Plan:    1) MARIPOSA- due to volume depletion with high volume NG output in setting of SBO + contrast nephropathy with contrast studies 7/21 + 7/22- im 6.9 07/24/2021    ALB 2.6 07/24/2021    ALKPHO 79 07/24/2021    BILT 1.4 07/24/2021    TP 5.7 07/24/2021    AST 18 07/24/2021    ALT 15 07/24/2021    INR 1.17 07/23/2021    PTP 15.3 07/23/2021    MG 3.1 07/24/2021    PHOS 2.4 07/24/2021    PGLU 223 07/24/2 puff, Inhalation, Q6H PRN  ALPRAZolam (XANAX) tab 2 mg, 2 mg, Oral, BID PRN  amphetamine-dextroamphetamine (ADDERALL) tab 30 mg, 30 mg, Oral, BID  hydrALAzine HCl (APRESOLINE) tab 50 mg, 50 mg, Oral, Daily  Umeclidinium Bromide (INCRUSE ELLIPTA) 62.5 MCG/I

## 2021-07-25 ENCOUNTER — APPOINTMENT (OUTPATIENT)
Dept: GENERAL RADIOLOGY | Facility: HOSPITAL | Age: 66
DRG: 335 | End: 2021-07-25
Attending: NURSE PRACTITIONER
Payer: MEDICARE

## 2021-07-25 LAB
ALBUMIN SERPL-MCNC: 2 G/DL (ref 3.4–5)
ALBUMIN/GLOB SERPL: 0.6 {RATIO} (ref 1–2)
ALP LIVER SERPL-CCNC: 77 U/L
ALT SERPL-CCNC: 15 U/L
ANION GAP SERPL CALC-SCNC: 2 MMOL/L (ref 0–18)
AST SERPL-CCNC: 24 U/L (ref 15–37)
BASOPHILS # BLD AUTO: 0.01 X10(3) UL (ref 0–0.2)
BASOPHILS NFR BLD AUTO: 0.1 %
BILIRUB SERPL-MCNC: 1.5 MG/DL (ref 0.1–2)
BUN BLD-MCNC: 49 MG/DL (ref 7–18)
BUN/CREAT SERPL: 33.6 (ref 10–20)
CALCIUM BLD-MCNC: 7 MG/DL (ref 8.5–10.1)
CHLORIDE SERPL-SCNC: 117 MMOL/L (ref 98–112)
CO2 SERPL-SCNC: 29 MMOL/L (ref 21–32)
CREAT BLD-MCNC: 1.46 MG/DL
DEPRECATED RDW RBC AUTO: 54.4 FL (ref 35.1–46.3)
EOSINOPHIL # BLD AUTO: 0.1 X10(3) UL (ref 0–0.7)
EOSINOPHIL NFR BLD AUTO: 0.6 %
ERYTHROCYTE [DISTWIDTH] IN BLOOD BY AUTOMATED COUNT: 14.3 % (ref 11–15)
GLOBULIN PLAS-MCNC: 3.2 G/DL (ref 2.8–4.4)
GLUCOSE BLD-MCNC: 137 MG/DL (ref 70–99)
GLUCOSE BLD-MCNC: 149 MG/DL (ref 70–99)
GLUCOSE BLD-MCNC: 178 MG/DL (ref 70–99)
GLUCOSE BLD-MCNC: 193 MG/DL (ref 70–99)
GLUCOSE BLD-MCNC: 206 MG/DL (ref 70–99)
HAV IGM SER QL: 2.9 MG/DL (ref 1.6–2.6)
HCT VFR BLD AUTO: 19.1 %
HGB BLD-MCNC: 5.4 G/DL
HGB BLD-MCNC: 6.1 G/DL
HGB BLD-MCNC: 7.2 G/DL
IMM GRANULOCYTES # BLD AUTO: 0.16 X10(3) UL (ref 0–1)
IMM GRANULOCYTES NFR BLD: 0.9 %
LYMPHOCYTES # BLD AUTO: 1.71 X10(3) UL (ref 1–4)
LYMPHOCYTES NFR BLD AUTO: 9.9 %
M PROTEIN MFR SERPL ELPH: 5.2 G/DL (ref 6.4–8.2)
MCH RBC QN AUTO: 29.3 PG (ref 26–34)
MCHC RBC AUTO-ENTMCNC: 28.3 G/DL (ref 31–37)
MCV RBC AUTO: 103.8 FL
MONOCYTES # BLD AUTO: 0.98 X10(3) UL (ref 0.1–1)
MONOCYTES NFR BLD AUTO: 5.7 %
NEUTROPHILS # BLD AUTO: 14.33 X10 (3) UL (ref 1.5–7.7)
NEUTROPHILS # BLD AUTO: 14.33 X10(3) UL (ref 1.5–7.7)
NEUTROPHILS NFR BLD AUTO: 82.8 %
OSMOLALITY SERPL CALC.SUM OF ELEC: 323 MOSM/KG (ref 275–295)
PHOSPHATE SERPL-MCNC: 1.9 MG/DL (ref 2.5–4.9)
PLATELET # BLD AUTO: 225 10(3)UL (ref 150–450)
POTASSIUM SERPL-SCNC: 3.1 MMOL/L (ref 3.5–5.1)
RBC # BLD AUTO: 1.84 X10(6)UL
SODIUM SERPL-SCNC: 148 MMOL/L (ref 136–145)
WBC # BLD AUTO: 17.3 X10(3) UL (ref 4–11)

## 2021-07-25 PROCEDURE — 30233N1 TRANSFUSION OF NONAUTOLOGOUS RED BLOOD CELLS INTO PERIPHERAL VEIN, PERCUTANEOUS APPROACH: ICD-10-PCS | Performed by: INTERNAL MEDICINE

## 2021-07-25 PROCEDURE — 99233 SBSQ HOSP IP/OBS HIGH 50: CPT | Performed by: HOSPITALIST

## 2021-07-25 PROCEDURE — 5A1945Z RESPIRATORY VENTILATION, 24-96 CONSECUTIVE HOURS: ICD-10-PCS | Performed by: ANESTHESIOLOGY

## 2021-07-25 PROCEDURE — 0BH18EZ INSERTION OF ENDOTRACHEAL AIRWAY INTO TRACHEA, VIA NATURAL OR ARTIFICIAL OPENING ENDOSCOPIC: ICD-10-PCS | Performed by: ANESTHESIOLOGY

## 2021-07-25 PROCEDURE — 71045 X-RAY EXAM CHEST 1 VIEW: CPT | Performed by: NURSE PRACTITIONER

## 2021-07-25 PROCEDURE — 99291 CRITICAL CARE FIRST HOUR: CPT | Performed by: INTERNAL MEDICINE

## 2021-07-25 RX ORDER — SODIUM CHLORIDE 9 MG/ML
INJECTION, SOLUTION INTRAVENOUS ONCE
Status: COMPLETED | OUTPATIENT
Start: 2021-07-25 | End: 2021-07-25

## 2021-07-25 RX ORDER — METOCLOPRAMIDE HYDROCHLORIDE 5 MG/ML
10 INJECTION INTRAMUSCULAR; INTRAVENOUS ONCE
Status: DISCONTINUED | OUTPATIENT
Start: 2021-07-25 | End: 2021-07-25

## 2021-07-25 NOTE — PLAN OF CARE
Received pt intubated and sedated on fentanyl and precedex. Pt unable to follow commands; opens eyes to speech and nods occasionally. Pt restless; wrist restraints in place. Lung sounds diminished. SBT performed & passed.  Extubated at [phone]; placed on 3L NC

## 2021-07-25 NOTE — PROGRESS NOTES
Pt remains on VC+ 14/500/40%/+5. FABIENNE [email or web address]@[email or web address]. Nebs given QID via metaneb along wit BID pulmicort and TID NACL 3%. Suctioning a small amount of thick yellow secretions. Will continue to monitor.       07/25/21 0320   Vent Information   $ RT [employer] (pe

## 2021-07-25 NOTE — PROGRESS NOTES
[facility]  Progress Note    Kip Font Patient Status:  Inpatient    1955 MRN [id number]   [facility] 4SW-A Attending Kristyn Eastman MD   [facility] Day # 6 PCP Parth Patel MD     Subjective:  Patient seen and examined.   He is s hernia with obstruction     AVN (avascular necrosis of bone) (HCC)     Unspecified internal derangement of knee     Shoulder arthritis     Aseptic necrosis of bone, site unspecified     S/P shoulder surgery-global 2/7/14     Syncope     Asthma exacerbation treatment was explained to the patient and the family.         DO ELOISE Meeks General Surgery  7/25/2021  12:25 PM

## 2021-07-25 NOTE — PROGRESS NOTES
[facility] Pulmonary/Critical Care Progress Note     SUBJECTIVE/Interval history: All events, procedures, notes reviewed.  Intubated and sedated on vent      Review of Systems:   A comprehensive 14 point review of system  114* 117*   CO2 34.0* 34.0* 29.0     Recent Labs   Lab 07/23/21  1812 07/24/21  0430 07/25/21  0536   RBC 3.19* 2.50* 1.84*   HGB 9.5* 7.4* 5.4*   HCT 32.0* 25.4* 19.1*   .3* 101.6* 103.8*   MCH 29.8 29.6 29.3   MCHC 29.7* 29.1* 28.3*   RDW Dictated by (CST): Sher Terrell MD on 7/22/2021 at 8:35 PM     Finalized by (CST): Sher Terrell MD on 7/22/2021 at 8:44 PM       XR CHEST AP PORTABLE  (CPT=71045)    Result Date: 7/24/2021  CONCLUSION:    Endotracheal tube 5.3 cm above the jordon. 0.4 mg Oral Daily   • traZODone HCl  50 mg Oral Nightly   • Heparin Sodium (Porcine)  5,000 Units Subcutaneous Q8H [address]   • Insulin Aspart Pen  1-10 Units Subcutaneous Q6H   • famoTIDine  20 mg Intravenous Daily     fentaNYL citrate **OR** fentaNYL citrate,

## 2021-07-25 NOTE — PLAN OF CARE
HGB this am was 5.4. Notified APN and orders received. Pt. To receive one unit of packed cells. Wife called to get consent and was witnessed by another RN.

## 2021-07-25 NOTE — PLAN OF CARE
Received pt intubated and sedated on fentanyl and precedex. Pt unable to follow commands; opens eyes to speech and nods occasionally. Pt restless; wrist restraints in place. Lung sounds diminished. SBT performed & passed.  Extubated at [phone]; placed on 3L NC

## 2021-07-25 NOTE — PROGRESS NOTES
[facility]  Nephrology Progress Note    [address] Attending:  Blanka Grover MD       Assessment and Plan:    1) MARIPOSA- due to volume depletion with high volume NG output in setting of SBO + contrast nephropathy with contrast studies 7/21 + 7/22- im 07/25/2021    BUN 49 07/25/2021     07/25/2021    K 3.1 07/25/2021     07/25/2021    CO2 29.0 07/25/2021     07/25/2021    CA 7.0 07/25/2021    ALB 2.0 07/25/2021    ALKPHO 77 07/25/2021    BILT 1.5 07/25/2021    TP 5.2 07/25/2021    AST PRN  Piperacillin Sod-Tazobactam So (ZOSYN) 4.5 g in dextrose 5% 100 mL IVPB-ADDV, 4.5 g, Intravenous, Q8H  budesonide (PULMICORT) 0.5 MG/2ML nebulizer solution 0.5 mg, 0.5 mg, Nebulization, 2 times daily  Albuterol Sulfate  (90 Base) MCG/ACT inhale Doxepin Pregnancy And Lactation Text: This medication is Pregnancy Category C and it isn't known if it is safe during pregnancy. It is also excreted in breast milk and breast feeding isn't recommended.

## 2021-07-25 NOTE — PROGRESS NOTES
[facility]  Progress Note     Syed Love Patient Status:  Inpatient    1955 MRN [id number]   [facility] 3NW-A Attending Jaqueline Coleman MD   [facility] Day # 6 PCP Latonia Sommers MD     Chief Complaint: abd pain    S: extubated 07/25/21  0536   * 216* 178*   BUN 91* 75* 49*   CREATSERUM 2.73* 2.17* 1.46*   GFRAA 27* 36* 58*   GFRNAA 24* 31* 50*   CA 7.4* 6.9* 7.0*   ALB 2.0* 2.6* 2.0*   * 149* 148*   K 2.7* 2.7* 3.1*    114* 117*   CO2 34.0* 34.0* 29.0   ALKPHO following  2. IVF  3. Pain control  4. NG to IS  5. No BM's  6. S/p ex lap, partial bowel resection, fascial dehiscence. NISHA in place. 7. TPN  2. Acute hypoxic/hypercapnic resp failure  1. Aspiration? Mucus plug on CT  2. CTA chest neg r/o PE  3. pulm  4.

## 2021-07-25 NOTE — PLAN OF CARE
Received pt. On ventilator at 40%. Responds to painful stimuli. At times. Awakens and starts to kick and bite th ETT. Fentanyl boluses and Precedex increased to calm him down. Levophed weaned off. Started TPN. Good urine output per gonzalez.   Art line w

## 2021-07-25 NOTE — DIETARY NOTE
[facility] - TPN FOLLOW UP    Arya Cardoza     Parenteral Nutrition - via PICC line     *Next TPN (Bag#2) to provide: 374 dextrose calories, 300 lipid calories, 31% lipids, 70 gm protein, and Total Calories: 954 kcal (~45% of TPN

## 2021-07-26 ENCOUNTER — ANESTHESIA EVENT (OUTPATIENT)
Dept: ENDOSCOPY | Facility: HOSPITAL | Age: 66
DRG: 335 | End: 2021-07-26
Payer: MEDICARE

## 2021-07-26 ENCOUNTER — ANESTHESIA (OUTPATIENT)
Dept: ENDOSCOPY | Facility: HOSPITAL | Age: 66
DRG: 335 | End: 2021-07-26
Payer: MEDICARE

## 2021-07-26 LAB
ALBUMIN SERPL-MCNC: 1.8 G/DL (ref 3.4–5)
ALBUMIN/GLOB SERPL: 0.5 {RATIO} (ref 1–2)
ALP LIVER SERPL-CCNC: 70 U/L
ALT SERPL-CCNC: 18 U/L
ANION GAP SERPL CALC-SCNC: 2 MMOL/L (ref 0–18)
AST SERPL-CCNC: 31 U/L (ref 15–37)
BILIRUB SERPL-MCNC: 0.7 MG/DL (ref 0.1–2)
BLOOD TYPE BARCODE: 9500
BUN BLD-MCNC: 29 MG/DL (ref 7–18)
BUN/CREAT SERPL: 28.7 (ref 10–20)
CALCIUM BLD-MCNC: 7.5 MG/DL (ref 8.5–10.1)
CHLORIDE SERPL-SCNC: 119 MMOL/L (ref 98–112)
CO2 SERPL-SCNC: 29 MMOL/L (ref 21–32)
CREAT BLD-MCNC: 1.01 MG/DL
DEPRECATED RDW RBC AUTO: 55.3 FL (ref 35.1–46.3)
ERYTHROCYTE [DISTWIDTH] IN BLOOD BY AUTOMATED COUNT: 15.6 % (ref 11–15)
GLOBULIN PLAS-MCNC: 3.4 G/DL (ref 2.8–4.4)
GLUCOSE BLD-MCNC: 154 MG/DL (ref 70–99)
GLUCOSE BLD-MCNC: 162 MG/DL (ref 70–99)
GLUCOSE BLD-MCNC: 189 MG/DL (ref 70–99)
GLUCOSE BLD-MCNC: 205 MG/DL (ref 70–99)
GLUCOSE BLD-MCNC: 223 MG/DL (ref 70–99)
HAV IGM SER QL: 2.5 MG/DL (ref 1.6–2.6)
HCT VFR BLD AUTO: 21.8 %
HGB BLD-MCNC: 6.8 G/DL
HGB BLD-MCNC: 7.4 G/DL
M PROTEIN MFR SERPL ELPH: 5.2 G/DL (ref 6.4–8.2)
MCH RBC QN AUTO: 30.2 PG (ref 26–34)
MCHC RBC AUTO-ENTMCNC: 31.2 G/DL (ref 31–37)
MCV RBC AUTO: 96.9 FL
OSMOLALITY SERPL CALC.SUM OF ELEC: 319 MOSM/KG (ref 275–295)
PHOSPHATE SERPL-MCNC: 2 MG/DL (ref 2.5–4.9)
PLATELET # BLD AUTO: 161 10(3)UL (ref 150–450)
POTASSIUM SERPL-SCNC: 2.9 MMOL/L (ref 3.5–5.1)
POTASSIUM SERPL-SCNC: 3.5 MMOL/L (ref 3.5–5.1)
RBC # BLD AUTO: 2.25 X10(6)UL
SARS-COV-2 RNA RESP QL NAA+PROBE: NOT DETECTED
SODIUM SERPL-SCNC: 150 MMOL/L (ref 136–145)
WBC # BLD AUTO: 12.3 X10(3) UL (ref 4–11)

## 2021-07-26 PROCEDURE — 0W3P8ZZ CONTROL BLEEDING IN GASTROINTESTINAL TRACT, VIA NATURAL OR ARTIFICIAL OPENING ENDOSCOPIC: ICD-10-PCS | Performed by: STUDENT IN AN ORGANIZED HEALTH CARE EDUCATION/TRAINING PROGRAM

## 2021-07-26 PROCEDURE — 99233 SBSQ HOSP IP/OBS HIGH 50: CPT | Performed by: HOSPITALIST

## 2021-07-26 PROCEDURE — 99291 CRITICAL CARE FIRST HOUR: CPT | Performed by: INTERNAL MEDICINE

## 2021-07-26 RX ORDER — IPRATROPIUM BROMIDE AND ALBUTEROL SULFATE 2.5; .5 MG/3ML; MG/3ML
SOLUTION RESPIRATORY (INHALATION)
Status: COMPLETED
Start: 2021-07-26 | End: 2021-07-26

## 2021-07-26 RX ORDER — LIDOCAINE HYDROCHLORIDE 10 MG/ML
INJECTION, SOLUTION EPIDURAL; INFILTRATION; INTRACAUDAL; PERINEURAL AS NEEDED
Status: DISCONTINUED | OUTPATIENT
Start: 2021-07-26 | End: 2021-07-26 | Stop reason: SURG

## 2021-07-26 RX ORDER — ONDANSETRON 2 MG/ML
4 INJECTION INTRAMUSCULAR; INTRAVENOUS AS NEEDED
Status: ACTIVE | OUTPATIENT
Start: 2021-07-26 | End: 2021-07-27

## 2021-07-26 RX ORDER — EPHEDRINE SULFATE 50 MG/ML
INJECTION INTRAVENOUS AS NEEDED
Status: DISCONTINUED | OUTPATIENT
Start: 2021-07-26 | End: 2021-07-26 | Stop reason: SURG

## 2021-07-26 RX ORDER — DEXTROSE MONOHYDRATE 25 G/50ML
50 INJECTION, SOLUTION INTRAVENOUS
Status: DISCONTINUED | OUTPATIENT
Start: 2021-07-26 | End: 2021-08-02

## 2021-07-26 RX ORDER — NALOXONE HYDROCHLORIDE 0.4 MG/ML
80 INJECTION, SOLUTION INTRAMUSCULAR; INTRAVENOUS; SUBCUTANEOUS AS NEEDED
Status: ACTIVE | OUTPATIENT
Start: 2021-07-26 | End: 2021-07-27

## 2021-07-26 RX ORDER — DEXTROSE MONOHYDRATE 50 MG/ML
INJECTION, SOLUTION INTRAVENOUS CONTINUOUS
Status: DISCONTINUED | OUTPATIENT
Start: 2021-07-26 | End: 2021-07-27

## 2021-07-26 RX ORDER — SODIUM CHLORIDE 9 MG/ML
INJECTION, SOLUTION INTRAVENOUS ONCE
Status: COMPLETED | OUTPATIENT
Start: 2021-07-26 | End: 2021-07-26

## 2021-07-26 RX ADMIN — EPHEDRINE SULFATE 5 MG: 50 INJECTION INTRAVENOUS at 14:47:00

## 2021-07-26 RX ADMIN — LIDOCAINE HYDROCHLORIDE 50 MG: 10 INJECTION, SOLUTION EPIDURAL; INFILTRATION; INTRACAUDAL; PERINEURAL at 14:33:00

## 2021-07-26 RX ADMIN — EPHEDRINE SULFATE 5 MG: 50 INJECTION INTRAVENOUS at 14:41:00

## 2021-07-26 NOTE — CONSULTS
[facility]                       Gastroenterology [address] [facility] Gastroenterology    marko Vera Isom Patient Status:  Inpatient    1955 MRN [id number]   [facility] 4SW-A Attending Keshawn Graham MD   [facility] Day # 7 PCP Divya Jeong 5.4 yesterday however Hgb 9.8 on 7/23 and prior to this 13-15. Pt off Levophed since yesterday 6 AM and remains hemodynamically stable.      PMHx:   Past Medical History:   Diagnosis Date   • Asthma    • Essential hypertension    • Extrinsic asthma, unspeci IVPB-ADDV, 2 g, Intravenous, Once  [COMPLETED] metRONIDAZOLE in NaCl (FLAGYL) IVPB premix 500 mg, 500 mg, Intravenous, Once  [COMPLETED] potassium chloride 40 mEq in sodium chloride 0.9% 250 mL IVPB, 40 mEq, Intravenous, Once  [COMPLETED] potassium chlorid BID  [COMPLETED] potassium chloride 40 mEq in sodium chloride 0.9% 250 mL IVPB, 40 mEq, Intravenous, Once  budesonide (PULMICORT) 0.5 MG/2ML nebulizer solution 0.5 mg, 0.5 mg, Nebulization, 2 times daily  [COMPLETED] sodium chloride 0.9% IV bolus 1,000 mL, yrs ago; The patient drinks alcohol on social occasions; The patient has no history of IV drug use or other illicit substances. FamHx: unable to obtain   ROS:  In addition to the pertinent positives described above:             Infectious Disease:  No chr dry  Psychiatric: Appropriate mood and congruent affect without obvious depression or anxiety  Labs:   Lab Results   Component Value Date    WBC 12.3 07/26/2021    HGB 6.8 07/26/2021    HCT 21.8 07/26/2021    .0 07/26/2021    CREATSERUM 1.01 07/26/2 ([address]) which includes the Dose Index Registry.       PATIENT STATED HISTORY:(As transcribed by Technologist)  patient presents with AMS and leukoctosis        CONTRAST USED:  100cc of Omnipaque 350       FINDINGS:     LUNG BASES     Finalized by (CST): Sea Reza MD on 7/22/2021 at 8:44 PM       _______________________________________________  PROCEDURE: Megan Williamson ABDOMEN OBSTRUCTIVE SERIES ROUTINE(2 VW)(CPT=74019)       TECHNIQUE:  2 view obstructive series of the abdomen and pel dark in color and Hgb continues to drop. + Meloxicam prior to admission.      Will plan for EGD under MAC with Dr Gerald Pena to evaluate for possible sources of UGI bleeding (radha given ongoing BUN elevation with return of normal reat) including anastomotic ulcera of SB dehiscence at site of anastomosis, as well risks/potential anesthesia complications as below.  OK to proceed w/ endoscopy w/ air insufflation per surgery, and further evaluation    Recommendations  -Lytes need to be corrected, renal following, prior t

## 2021-07-26 NOTE — PLAN OF CARE
Assumed care of patient at 0730 Resting in bed, alert and oriented to self, place and somewhat to stuation. Confusion at times and requesting to go home. Cpap in place, transitioning to NC. Sedate on Precedex. VSS; Art line removed. TPN infusing.  NG to L

## 2021-07-26 NOTE — PROGRESS NOTES
[facility]  Nephrology Progress Note    Lola Calderon Attending:  Papo Treadwell MD       Subjective:  Currently on PPV  Awakens to voice  Chart reviewed; still w/ sig NG output;     potassium phosphate dibasic 15 mmol in sodium chloride 0.9% 250 mL I Oral, Daily  tamsulosin HCl (FLOMAX) cap 0.4 mg, 0.4 mg, Oral, Daily  traZODone HCl (DESYREL) tab 50 mg, 50 mg, Oral, Nightly  glucose (DEX4) oral liquid 15 g, 15 g, Oral, Q15 Min PRN   Or  Glucose-Vitamin C (DEX-4) chewable tab 4 tablet, 4 tablet, Oral, Q all extremities  Skin: Warm and dry, no rashes     Recent Labs     07/23/21  0919 07/23/21  0919 07/23/21  1812 07/23/21  1812 07/24/21  0430 07/25/21  0536 07/25/21  1612 07/25/21  2256 07/26/21  0524   WBC 26.8*  --  25.9*  --  21.9* 17.3*  --   --  12.3 routine    [id number]    Mark Storey  7/26/2021

## 2021-07-26 NOTE — PROGRESS NOTES
[facility]  Progress Note    Jackie Garces Patient Status:  Inpatient    1955 MRN [id number]   [facility] 4SW-A Attending Yamel Montiel MD   [facility] Day # 7 PCP Karan Gu MD     Subjective:   The patient is alert today and stat pain     Recurrent ventral incisional hernia with obstruction     AVN (avascular necrosis of bone) (HCC)     Unspecified internal derangement of knee     Shoulder arthritis     Aseptic necrosis of bone, site unspecified     S/P shoulder surgery-global 2/7/ ventilator. NG tube output has thin no maroon NG tube output however it is thin coffee ground. Dressing is dry NISHA drain is serous.   Await results of EGD today

## 2021-07-26 NOTE — OPERATIVE REPORT
[facility]    PATIENT'S NAME: MOSHE CRAIG   ATTENDING PHYSICIAN: Josh Pierre MD   OPERATING PHYSICIAN: Pascual Tuttle D.O.   PATIENT ACCOUNT#:   [de-identified]    LOCATION:  [address] [place] [address] #:   [id number]       DATE OF BIRTH:  [birth year] There was no evidence of purulence or enteric contents. I was able to feel my hand in the right and left pericolic gutters. No fluid in the upper abdomen on digital examination. The pelvis was inspected, was found to be free of any infectious process.

## 2021-07-26 NOTE — ANESTHESIA PREPROCEDURE EVALUATION
PRE-OP EVALUATION    Patient Name: Lola Calderon    Admit Diagnosis: Small bowel obstruction ([facility] [address] [N83.436]    Pre-op Diagnosis: gi bleeding    ESOPHAGOGASTRODUODENOSCOPY (EGD)    Anesthesia Procedure: ESOPHAGOGASTRODUODENOSCOPY (EGD) (N/A )    Surgeon(s) Once  [COMPLETED] potassium chloride 40 mEq in sodium chloride 0.9% 250 mL IVPB, 40 mEq, Intravenous, Once  [COMPLETED] potassium chloride 40 mEq in sodium chloride 0.9% 250 mL IVPB, 40 mEq, Intravenous, Once  [COMPLETED] Albumin Human (ALBUMINAR) 5 % solu sodium chloride 0.9% IV bolus 1,000 mL, 1,000 mL, Intravenous, Once  [COMPLETED] ondansetron HCl (ZOFRAN) injection 4 mg, 4 mg, Intravenous, Once  Albuterol Sulfate  (90 Base) MCG/ACT inhaler 1 puff, 1 puff, Inhalation, Q6H PRN  ALPRAZolam Yury Vilchis t Disp: , Rfl: , Past Week at Unknown time  Dicyclomine HCl 20 MG Oral Tab, Take 20 mg by mouth 4 (four) times daily before meals and nightly., Disp: , Rfl: , Past Week at Unknown time  cholecalciferol 1.25 MG (45637 UT) Oral Cap, Take 1.25 mg by mouth every  MCG/ACT Inhalation Aero Soln, Inhale 2 puffs into the lungs 2 (two) times daily. , Disp: , Rfl: , Past Week at Unknown time  ketorolac tromethamine 0.5 % Ophthalmic Solution, Place 2 drops into both eyes 3 (three) times daily. , Disp: , Rfl: , Past We date: 5/14/2003      Tobacco comment: refused    Alcohol use: Yes      Comment: Pt stated only one time birthday      Drug use: No     Available pre-op labs reviewed.   Lab Results   Component Value Date    WBC 12.3 (H) 07/26/2021    RBC 2.25 (L) 07/26/2021

## 2021-07-26 NOTE — RESPIRATORY THERAPY NOTE
Received pt on 3L NC. Albuterol given QID with TID NACL 3% and BID pulmicort via metaneb. Breath sounds diminished. Pt placed on cpap with sleep. Will continue to monitor.

## 2021-07-26 NOTE — OPERATIVE REPORT
EGD Operative Report  Patient Name: Susi Stover  YOB: 1955  MRN: [id number]  Procedure: Esophagogastroduodenoscopy (EGD)  With gastric ulcer w/ visible vessel s/p endotherapy  Pre-operative Diagnosis & Indication:   1. Bloody NG output  2.  A endoscopy suite where his/her pulse, pulse oximetry and blood pressure were monitored. The patient was placed in the left lateral decubitus position and deep sedation was administered.  Once adequate sedation was achieved, a bite block was placed and a lubr evidence of a hiatal hernia seen on retroflexion.    • Duodenum:   o The entire examined duodenum was normal.    Recommendations:  - Post EGD precautions, watch for bleeding, infection, perforation, adverse drug reactions   - Check CBC, transfuse if Hg < 7;

## 2021-07-26 NOTE — DIETARY NOTE
[facility] - TPN FOLLOW UP    Chao Cardoza     Parenteral Nutrition - via PICC line     *Next TPN (Bag#3) to provide:374 dextrose calories, 450 lipid calories, 36% lipids, 105 gm protein, and Total Calories: 1244 kcal (~59% of TP nutrition risk    Cecilia Copper  Dietetic Intern

## 2021-07-26 NOTE — PROGRESS NOTES
[facility]  Progress Note     Samantha Cain Patient Status:  Inpatient    1955 MRN [id number]   [facility] 3NW-A Attending Bambi Kruse MD   [facility] Day # 7 PCP Sanket Price MD     Chief Complaint: abd pain    S: went down --   --  1.17*  --   --   --   --   --   --   --   --     < > = values in this interval not displayed.        Recent Labs   Lab 07/24/21  0430 07/25/21  0536 07/26/21  0524   * 178* 154*   BUN 75* 49* 29*   CREATSERUM 2.17* 1.46* 1.01   GFRAA 36* 58* Obstruction  2. GI bleed, gastric ulcer, esophagitis, Abhijeet's ulcer, gastric erosions  1. Surgery following  2. IVF  3. Pain control  4. No BM's  5. S/p ex lap, partial bowel resection, fascial dehiscence. NISHA in place. 6. TPN  7.  Visibile vessel w/ gas

## 2021-07-26 NOTE — PROGRESS NOTES
[facility] Pulmonary/Critical Care Progress Note     SUBJECTIVE/Interval history: All events, procedures, notes reviewed. pt with UGI bleed from ngt and large output this amn. Doing well on 3 L resp wise.  Asking for food 29.0     Recent Labs   Lab 07/23/21  1812 07/23/21  1812 07/24/21  0430 07/24/21  0430 07/25/21  0536 07/25/21  0536 07/25/21  1612 07/25/21  2256 07/26/21  0524   RBC 3.19*   < > 2.50*  --  1.84*  --   --   --  2.25*   HGB 9.5*   < > 7.4*   < > 5.4*   < > 7/24/2021  CONCLUSION:    Endotracheal tube 5.3 cm above the jordon. Nasogastric tube tip in the stomach. Continued retrocardiac opacity may reflect pneumonia or atelectasis, stable. The right chest appears clear. No sizable effusion or pneumothorax. Tartrate, hydrALAzine HCl, Albuterol Sulfate HFA, alprazolam, glucose **OR** Glucose-Vitamin C **OR** dextrose **OR** glucose **OR** Glucose-Vitamin C, HYDROmorphone HCl **OR** HYDROmorphone HCl **OR** HYDROmorphone HCl, ondansetron HCl    ASSESSMENT    ·

## 2021-07-27 ENCOUNTER — ANESTHESIA EVENT (OUTPATIENT)
Dept: SURGERY | Facility: HOSPITAL | Age: 66
DRG: 335 | End: 2021-07-27
Payer: MEDICARE

## 2021-07-27 LAB
ALBUMIN SERPL-MCNC: 2 G/DL (ref 3.4–5)
ALBUMIN/GLOB SERPL: 0.6 {RATIO} (ref 1–2)
ALP LIVER SERPL-CCNC: 80 U/L
ALT SERPL-CCNC: 19 U/L
ANION GAP SERPL CALC-SCNC: 3 MMOL/L (ref 0–18)
AST SERPL-CCNC: 18 U/L (ref 15–37)
BASOPHILS # BLD AUTO: 0.01 X10(3) UL (ref 0–0.2)
BASOPHILS NFR BLD AUTO: 0.1 %
BILIRUB SERPL-MCNC: 0.4 MG/DL (ref 0.1–2)
BLOOD TYPE BARCODE: 6200
BLOOD TYPE BARCODE: 6200
BUN BLD-MCNC: 20 MG/DL (ref 7–18)
BUN/CREAT SERPL: 26 (ref 10–20)
CALCIUM BLD-MCNC: 8 MG/DL (ref 8.5–10.1)
CHLORIDE SERPL-SCNC: 115 MMOL/L (ref 98–112)
CO2 SERPL-SCNC: 28 MMOL/L (ref 21–32)
CREAT BLD-MCNC: 0.77 MG/DL
DEPRECATED RDW RBC AUTO: 56.7 FL (ref 35.1–46.3)
EOSINOPHIL # BLD AUTO: 0.16 X10(3) UL (ref 0–0.7)
EOSINOPHIL NFR BLD AUTO: 1.4 %
ERYTHROCYTE [DISTWIDTH] IN BLOOD BY AUTOMATED COUNT: 16.9 % (ref 11–15)
GLOBULIN PLAS-MCNC: 3.6 G/DL (ref 2.8–4.4)
GLUCOSE BLD-MCNC: 126 MG/DL (ref 70–99)
GLUCOSE BLD-MCNC: 150 MG/DL (ref 70–99)
GLUCOSE BLD-MCNC: 183 MG/DL (ref 70–99)
GLUCOSE BLD-MCNC: 220 MG/DL (ref 70–99)
GLUCOSE BLD-MCNC: 221 MG/DL (ref 70–99)
HAV IGM SER QL: 1.8 MG/DL (ref 1.6–2.6)
HAV IGM SER QL: 2.1 MG/DL (ref 1.6–2.6)
HCT VFR BLD AUTO: 24.3 %
HGB BLD-MCNC: 7.7 G/DL
IMM GRANULOCYTES # BLD AUTO: 0.11 X10(3) UL (ref 0–1)
IMM GRANULOCYTES NFR BLD: 0.9 %
LYMPHOCYTES # BLD AUTO: 1.11 X10(3) UL (ref 1–4)
LYMPHOCYTES NFR BLD AUTO: 9.5 %
M PROTEIN MFR SERPL ELPH: 5.6 G/DL (ref 6.4–8.2)
MCH RBC QN AUTO: 29.2 PG (ref 26–34)
MCHC RBC AUTO-ENTMCNC: 31.7 G/DL (ref 31–37)
MCV RBC AUTO: 92 FL
MONOCYTES # BLD AUTO: 0.39 X10(3) UL (ref 0.1–1)
MONOCYTES NFR BLD AUTO: 3.3 %
NEUTROPHILS # BLD AUTO: 9.88 X10 (3) UL (ref 1.5–7.7)
NEUTROPHILS # BLD AUTO: 9.88 X10(3) UL (ref 1.5–7.7)
NEUTROPHILS NFR BLD AUTO: 84.8 %
OSMOLALITY SERPL CALC.SUM OF ELEC: 311 MOSM/KG (ref 275–295)
PHOSPHATE SERPL-MCNC: 2.2 MG/DL (ref 2.5–4.9)
PHOSPHATE SERPL-MCNC: 2.2 MG/DL (ref 2.5–4.9)
PLATELET # BLD AUTO: 162 10(3)UL (ref 150–450)
POTASSIUM SERPL-SCNC: 3.2 MMOL/L (ref 3.5–5.1)
POTASSIUM SERPL-SCNC: 3.4 MMOL/L (ref 3.5–5.1)
RBC # BLD AUTO: 2.64 X10(6)UL
SODIUM SERPL-SCNC: 146 MMOL/L (ref 136–145)
WBC # BLD AUTO: 11.7 X10(3) UL (ref 4–11)

## 2021-07-27 PROCEDURE — 99233 SBSQ HOSP IP/OBS HIGH 50: CPT | Performed by: HOSPITALIST

## 2021-07-27 PROCEDURE — 99233 SBSQ HOSP IP/OBS HIGH 50: CPT | Performed by: INTERNAL MEDICINE

## 2021-07-27 RX ORDER — BISACODYL 10 MG
10 SUPPOSITORY, RECTAL RECTAL
Status: DISCONTINUED | OUTPATIENT
Start: 2021-07-27 | End: 2021-07-27

## 2021-07-27 RX ORDER — HALOPERIDOL 5 MG/ML
INJECTION INTRAMUSCULAR
Status: COMPLETED
Start: 2021-07-27 | End: 2021-07-27

## 2021-07-27 RX ORDER — MAGNESIUM SULFATE HEPTAHYDRATE 40 MG/ML
2 INJECTION, SOLUTION INTRAVENOUS ONCE
Status: COMPLETED | OUTPATIENT
Start: 2021-07-27 | End: 2021-07-27

## 2021-07-27 RX ORDER — BISACODYL 10 MG
10 SUPPOSITORY, RECTAL RECTAL
Status: DISCONTINUED | OUTPATIENT
Start: 2021-07-27 | End: 2021-07-28

## 2021-07-27 RX ORDER — METOPROLOL TARTRATE 5 MG/5ML
5 INJECTION INTRAVENOUS EVERY 6 HOURS PRN
Status: DISCONTINUED | OUTPATIENT
Start: 2021-07-27 | End: 2021-08-02

## 2021-07-27 RX ORDER — ENALAPRILAT 2.5 MG/2ML
1.25 INJECTION INTRAVENOUS ONCE
Status: COMPLETED | OUTPATIENT
Start: 2021-07-27 | End: 2021-07-27

## 2021-07-27 RX ORDER — POTASSIUM CHLORIDE 14.9 MG/ML
20 INJECTION INTRAVENOUS ONCE
Status: COMPLETED | OUTPATIENT
Start: 2021-07-27 | End: 2021-07-27

## 2021-07-27 RX ORDER — HYDRALAZINE HYDROCHLORIDE 20 MG/ML
20 INJECTION INTRAMUSCULAR; INTRAVENOUS
Status: DISCONTINUED | OUTPATIENT
Start: 2021-07-27 | End: 2021-08-02

## 2021-07-27 RX ORDER — HALOPERIDOL 5 MG/ML
1 INJECTION INTRAMUSCULAR EVERY 6 HOURS PRN
Status: DISCONTINUED | OUTPATIENT
Start: 2021-07-27 | End: 2021-07-31

## 2021-07-27 RX ORDER — POTASSIUM CHLORIDE 14.9 MG/ML
20 INJECTION INTRAVENOUS ONCE
Status: COMPLETED | OUTPATIENT
Start: 2021-07-27 | End: 2021-07-28

## 2021-07-27 NOTE — RESPIRATORY THERAPY NOTE
Pt remain stable on Room Air. Breath sounds are course to diminish bilaterally. Pt refused metaneb tx @ 2844 and vomited after neb tx.

## 2021-07-27 NOTE — ANESTHESIA PREPROCEDURE EVALUATION
PRE-OP EVALUATION    Patient Name: Susi Stover    Admit Diagnosis: Small bowel obstruction ([facility] [address] [M57.031]    Pre-op Diagnosis: inpt    REVISION AMPUTATION ON RIGHT GREAT TOE    Anesthesia Procedure: REVISION AMPUTATION ON RIGHT GREAT TOE (Right )    Merly PRN  [COMPLETED] ipratropium-albuterol (DUONEB) 0.5-2.5 (3) MG/3ML nebulizer solution, , ,   [COMPLETED] 0.9% NaCl infusion, , Intravenous, Once  [COMPLETED] potassium phosphate dibasic 30 mmol in sodium chloride 0.9% 250 mL IVPB, 30 mmol, Intravenous, Onc Followed by  [COMPLETED] potassium chloride IVPB premix 20 mEq, 20 mEq, Intravenous, Once  [COMPLETED] iohexol (OMNIPAQUE) 350 MG/ML injection 100 mL, 100 mL, Intravenous, ONCE PRN  [COMPLETED] potassium chloride 40 mEq in sodium chloride 0.9% 250 mL IVPB, Q2H PRN  ondansetron HCl (ZOFRAN) injection 4 mg, 4 mg, Intravenous, Q6H PRN  Insulin Aspart Pen (NOVOLOG) 100 UNIT/ML flexpen 1-10 Units, 1-10 Units, Subcutaneous, Q6H        Outpatient Medications:   alprazolam 2 MG Oral Tab, Take 2 mg by mouth 2 (two) t Unknown time  Insulin Lispro 100 UNIT/ML Subcutaneous Solution, Inject 20 Units into the skin TID PC., Disp: , Rfl: , Past Week at Unknown time  multivitamin with minerals Oral Tab, Take 1 tablet by mouth daily. , Disp: 30 tablet, Rfl: 0, Past Week at [employer] abnormalities. Left ventricular diastolic      function parameters were normal for the patient's age. 2. Right ventricle: The cavity size was normal. Systolic function was      normal.   3. Atrial septum: Echo contrast study showed no shunt.    4. Pulmona Airway      Mallampati: II  Mouth opening: >3 FB  TM distance: > 6 cm  Neck ROM: full Cardiovascular      Rhythm: regular  Rate: normal     Dental             Pulmonary      Breath sounds clear to auscultation bilaterally.                Other findi

## 2021-07-27 NOTE — DIETARY NOTE
[facility] [facility] - TPN FOLLOW UP    Charles Cardoza     Parenteral Nutrition - via PICC line.     Tonight's TPN (Bag #4) to provide: 640 dextrose calories, 600 lipid calories, 36% lipids, 105 gm protein, and Total Calories: 1660 kcal (79%

## 2021-07-27 NOTE — PROGRESS NOTES
[facility] Pulmonary/Critical Care Progress Note     SUBJECTIVE/Interval history: All events, procedures, notes reviewed. Pt still with ileus. No cp or sob. He is having left abdominal pain.      Review of Systems:   MARLENY morrow GFRNAA 50*  --  78  --  96   CA 7.0*  --  7.5*  --  8.0*   *  --  150*  --  146*   K 3.1*   < > 2.9* 3.5 3.2*   *  --  119*  --  115*   CO2 29.0  --  29.0  --  28.0    < > = values in this interval not displayed.      Recent Labs   Lab 07/24/2 MD on 7/25/2021 at 12:20 PM         • potassium phosphate IVPB  15 mmol Intravenous Once    Followed by   • potassium chloride  20 mEq Intravenous Once   • insulin detemir  32 Units Subcutaneous Nightly   • pantoprazole (PROTONIX) IV push  40 mg Intravenou no shunt and preserved ef.  · Off pressors  · Hemorrhagic anemia: monitor hgb. Received 2 u prbc. Ulcer cauterized  · chemo improving with ivf  · On zosyn post op.  Stop after 7 days  · FEN: on tpn  · Proph: hep subcut  · D/w rn/apn/gen surg  · cctime 40 min

## 2021-07-27 NOTE — PLAN OF CARE
Assumed care of pt, see emar for meds given, x1 episode of emesis, pain medication q2, patient with increased agitation, discussed with critical care MD, EKG showing stable QT, Haldol 1mg Q6 PRN ordered with improvement in patient agitation.  This RN made M

## 2021-07-27 NOTE — ANESTHESIA POSTPROCEDURE EVALUATION
[address] [facility] Patient Status:  Inpatient   Age/Gender 61year old male MRN [id number]   [facility] 4SW-A Attending Annel Cochran MD   [facility] Day # 8 PCP Eugenio Andres MD       Anesthesia Post-op Note    ESOPHAGOGASTRODUODE

## 2021-07-27 NOTE — PROGRESS NOTES
[facility] Gastroenterology     Iman Cardoza Patient Status:  Inpatient    1955 MRN [id number]   [facility] 4SW-A Attending Amber Saldana MD   [facility] Day # 8 PCP Loretta Wakefield MD encounter     Oliguria     Shock ([address]     Hypernatremia     Hypophosphatasia      PMH, PSH, Fhx, Shx as per initial consult note    Review of Systems  12 point Review of Systems negative unless otherwise mentioned in Subjective.      Physical Exam  BP (!) 07/23/21 1812   PTP 15.3*   INR 1.17*            Blood Output: 30 mL (7/23/2021  6:28 PM)      @[email or web address](      Imaging: Imaging data reviewed in Epic.     Medications:   • potassium chloride  20 mEq Intravenous Once   • insulin detemir  32 Units Subcuta missed. answered all questions in full. GI will signoff. Please page with questions.      Lam Alcaarz MD  [facility]astroenterology

## 2021-07-27 NOTE — PROGRESS NOTES
Patient was seen resting comfortably in bed he is requesting something to drink however he is n.p.o. because of his bowel obstruction. He is trying to have a bowel movement currently.     Today I reviewed with him that his toe has degenerated it has turned

## 2021-07-27 NOTE — PROGRESS NOTES
07/26/21  1800   BP: 143/62   Pulse: 74   Resp: 22   Temp:    Patient's temperature when I saw him was 98.5 I saw him approximately noon. He was readmitted about 12 hours later with small bowel obstruction.   I have not seen him now he is 10 days postoper

## 2021-07-27 NOTE — PLAN OF CARE
Resumed patient care at 0. Patient is awake, alert, but confused at times and constantly asking to go home. Asking for water to drink, NPO. Precedex to keep patient calm. On CPAP at night. On TPN.  Jones cathter present with good output, Leaks around at

## 2021-07-27 NOTE — PROGRESS NOTES
[facility]  Progress Note     Lola Calderon Patient Status:  Inpatient    1955 MRN [id number]   [facility] 3NW-A Attending Sawyer Guzman MD   [facility] Day # 8 PCP Barron Seo MD     Chief Complaint: abd pain    S: more aler Lab 07/25/21  0536 07/25/21  0536 07/26/21  0524 07/26/21  1738 07/27/21  0450   *  --  154*  --  220*   BUN 49*  --  29*  --  20*   CREATSERUM 1.46*  --  1.01  --  0.77   GFRAA 58*  --  90  --  111   GFRNAA 50*  --  78  --  96   CA 7.0*  --  7.5* Heparin Sodium (Porcine)  5,000 Units Subcutaneous Q8H [address]   • Insulin Aspart Pen  1-10 Units Subcutaneous Q6H       ASSESSMENT / PLAN:     1. Small Bowel Obstruction  2. GI bleed, gastric ulcer, esophagitis, Abhijeet's ulcer, gastric erosions  1.  Surgery f no  Estimated date of discharge: tbd  Discharge is dependent on: progress  At this point Mr. Cardoza is expected to be discharge to: home    Plan of care discussed with patient, Edgar Paul MD

## 2021-07-27 NOTE — PROGRESS NOTES
[facility]  Progress Note    Yvonne Hatch Patient Status:  Inpatient    1955 MRN [id number]   [facility] 4SW-A Attending Mahesh Em MD   [facility] Day # 8 PCP Minnie Early MD     Subjective: The patient is resting in bed.  The obstruction     AVN (avascular necrosis of bone) ([facility])     Unspecified internal derangement of knee     Shoulder arthritis     Aseptic necrosis of bone, site unspecified     S/P shoulder surgery-global 2/7/14     Syncope     Asthma exacerbation     Acute a function  6. Continue dressing changes daily  7. GI prophylaxis- Protonix  8. DVT prophylaxis     Cely Arrington PA-C   1/82/6883  10:10 AM.  Addendum:  Dr. Myesha Knight       I have reviewed the above listed note and evaluation by the physician assistant.     I have

## 2021-07-28 ENCOUNTER — ANESTHESIA (OUTPATIENT)
Dept: SURGERY | Facility: HOSPITAL | Age: 66
DRG: 335 | End: 2021-07-28
Payer: MEDICARE

## 2021-07-28 ENCOUNTER — APPOINTMENT (OUTPATIENT)
Dept: CT IMAGING | Facility: HOSPITAL | Age: 66
DRG: 335 | End: 2021-07-28
Attending: SURGERY
Payer: MEDICARE

## 2021-07-28 ENCOUNTER — APPOINTMENT (OUTPATIENT)
Dept: GENERAL RADIOLOGY | Facility: HOSPITAL | Age: 66
DRG: 335 | End: 2021-07-28
Attending: NURSE PRACTITIONER
Payer: MEDICARE

## 2021-07-28 LAB
ALBUMIN SERPL-MCNC: 2.1 G/DL (ref 3.4–5)
ALBUMIN/GLOB SERPL: 0.5 {RATIO} (ref 1–2)
ALP LIVER SERPL-CCNC: 126 U/L
ALT SERPL-CCNC: 25 U/L
ANION GAP SERPL CALC-SCNC: 5 MMOL/L (ref 0–18)
ARTERIAL BLD GAS O2 SATURATION: 95 % (ref 92–100)
ARTERIAL BLOOD GAS BASE EXCESS: -0.6 MMOL/L (ref ?–2)
ARTERIAL BLOOD GAS HCO3: 24.5 MEQ/L (ref 22–26)
ARTERIAL BLOOD GAS PCO2: 44 MM HG (ref 35–45)
ARTERIAL BLOOD GAS PH: 7.37 (ref 7.35–7.45)
ARTERIAL BLOOD GAS PO2: 130 MM HG (ref 80–105)
AST SERPL-CCNC: 24 U/L (ref 15–37)
ATRIAL RATE: 106 BPM
BASOPHILS # BLD AUTO: 0.02 X10(3) UL (ref 0–0.2)
BASOPHILS NFR BLD AUTO: 0.2 %
BILIRUB SERPL-MCNC: 0.6 MG/DL (ref 0.1–2)
BUN BLD-MCNC: 13 MG/DL (ref 7–18)
BUN/CREAT SERPL: 20 (ref 10–20)
CALCIUM BLD-MCNC: 7.9 MG/DL (ref 8.5–10.1)
CALCULATED O2 SATURATION: 99 % (ref 92–100)
CARBOXYHEMOGLOBIN: 2.8 % SAT (ref 0–3)
CHLORIDE SERPL-SCNC: 112 MMOL/L (ref 98–112)
CO2 SERPL-SCNC: 24 MMOL/L (ref 21–32)
CREAT BLD-MCNC: 0.65 MG/DL
DEPRECATED RDW RBC AUTO: 53.7 FL (ref 35.1–46.3)
EOSINOPHIL # BLD AUTO: 0.14 X10(3) UL (ref 0–0.7)
EOSINOPHIL NFR BLD AUTO: 1.2 %
ERYTHROCYTE [DISTWIDTH] IN BLOOD BY AUTOMATED COUNT: 16.1 % (ref 11–15)
FIO2: 40 %
GLOBULIN PLAS-MCNC: 4.1 G/DL (ref 2.8–4.4)
GLUCOSE BLD-MCNC: 141 MG/DL (ref 70–99)
GLUCOSE BLD-MCNC: 142 MG/DL (ref 70–99)
GLUCOSE BLD-MCNC: 144 MG/DL (ref 70–99)
GLUCOSE BLD-MCNC: 174 MG/DL (ref 70–99)
GLUCOSE BLD-MCNC: 209 MG/DL (ref 70–99)
GLUCOSE BLD-MCNC: 235 MG/DL (ref 70–99)
HAV IGM SER QL: 1.7 MG/DL (ref 1.6–2.6)
HCT VFR BLD AUTO: 26.6 %
HGB BLD-MCNC: 8.3 G/DL
IMM GRANULOCYTES # BLD AUTO: 0.12 X10(3) UL (ref 0–1)
IMM GRANULOCYTES NFR BLD: 1 %
LYMPHOCYTES # BLD AUTO: 0.76 X10(3) UL (ref 1–4)
LYMPHOCYTES NFR BLD AUTO: 6.4 %
M PROTEIN MFR SERPL ELPH: 6.2 G/DL (ref 6.4–8.2)
MCH RBC QN AUTO: 28.7 PG (ref 26–34)
MCHC RBC AUTO-ENTMCNC: 31.2 G/DL (ref 31–37)
MCV RBC AUTO: 92 FL
METHEMOGLOBIN: 0.4 % SAT (ref 0.4–1.5)
MONOCYTES # BLD AUTO: 0.54 X10(3) UL (ref 0.1–1)
MONOCYTES NFR BLD AUTO: 4.6 %
NEUTROPHILS # BLD AUTO: 10.25 X10 (3) UL (ref 1.5–7.7)
NEUTROPHILS # BLD AUTO: 10.25 X10(3) UL (ref 1.5–7.7)
NEUTROPHILS NFR BLD AUTO: 86.6 %
OSMOLALITY SERPL CALC.SUM OF ELEC: 294 MOSM/KG (ref 275–295)
P AXIS: 74 DEGREES
P-R INTERVAL: 130 MS
PATIENT TEMPERATURE: 99.3 F
PEEP: 5 CM H2O
PHOSPHATE SERPL-MCNC: 2.7 MG/DL (ref 2.5–4.9)
PLATELET # BLD AUTO: 180 10(3)UL (ref 150–450)
POTASSIUM SERPL-SCNC: 3.6 MMOL/L (ref 3.5–5.1)
PRESSURE SUPPORT: 5 CM H2O
Q-T INTERVAL: 340 MS
QRS DURATION: 120 MS
QTC CALCULATION (BEZET): 451 MS
R AXIS: -7 DEGREES
RBC # BLD AUTO: 2.89 X10(6)UL
SODIUM SERPL-SCNC: 141 MMOL/L (ref 136–145)
T AXIS: 60 DEGREES
TOTAL HEMOGLOBIN: 6.3 G/DL
VENTRICULAR RATE: 106 BPM
WBC # BLD AUTO: 11.8 X10(3) UL (ref 4–11)

## 2021-07-28 PROCEDURE — 74177 CT ABD & PELVIS W/CONTRAST: CPT | Performed by: SURGERY

## 2021-07-28 PROCEDURE — 99233 SBSQ HOSP IP/OBS HIGH 50: CPT | Performed by: INTERNAL MEDICINE

## 2021-07-28 PROCEDURE — 0Y6M0Z9 DETACHMENT AT RIGHT FOOT, PARTIAL 1ST RAY, OPEN APPROACH: ICD-10-PCS | Performed by: PODIATRIST

## 2021-07-28 PROCEDURE — 3E0T3BZ INTRODUCTION OF ANESTHETIC AGENT INTO PERIPHERAL NERVES AND PLEXI, PERCUTANEOUS APPROACH: ICD-10-PCS | Performed by: ANESTHESIOLOGY

## 2021-07-28 PROCEDURE — 28810 AMPUTATION TOE & METATARSAL: CPT | Performed by: PODIATRIST

## 2021-07-28 RX ORDER — DEXTROSE MONOHYDRATE 25 G/50ML
50 INJECTION, SOLUTION INTRAVENOUS
Status: DISCONTINUED | OUTPATIENT
Start: 2021-07-28 | End: 2021-07-28

## 2021-07-28 RX ORDER — KETAMINE HYDROCHLORIDE 50 MG/ML
INJECTION, SOLUTION, CONCENTRATE INTRAMUSCULAR; INTRAVENOUS AS NEEDED
Status: DISCONTINUED | OUTPATIENT
Start: 2021-07-28 | End: 2021-07-28 | Stop reason: SURG

## 2021-07-28 RX ORDER — SODIUM CHLORIDE, SODIUM LACTATE, POTASSIUM CHLORIDE, CALCIUM CHLORIDE 600; 310; 30; 20 MG/100ML; MG/100ML; MG/100ML; MG/100ML
INJECTION, SOLUTION INTRAVENOUS CONTINUOUS
Status: DISCONTINUED | OUTPATIENT
Start: 2021-07-28 | End: 2021-07-31

## 2021-07-28 RX ORDER — BISACODYL 10 MG
10 SUPPOSITORY, RECTAL RECTAL
Status: DISCONTINUED | OUTPATIENT
Start: 2021-07-28 | End: 2021-07-29

## 2021-07-28 RX ORDER — INSULIN ASPART 100 [IU]/ML
INJECTION, SOLUTION INTRAVENOUS; SUBCUTANEOUS ONCE
Status: DISCONTINUED | OUTPATIENT
Start: 2021-07-28 | End: 2021-07-30 | Stop reason: ALTCHOICE

## 2021-07-28 RX ORDER — BUPIVACAINE HYDROCHLORIDE 5 MG/ML
INJECTION, SOLUTION EPIDURAL; INTRACAUDAL AS NEEDED
Status: DISCONTINUED | OUTPATIENT
Start: 2021-07-28 | End: 2021-07-28 | Stop reason: HOSPADM

## 2021-07-28 RX ORDER — NALOXONE HYDROCHLORIDE 0.4 MG/ML
80 INJECTION, SOLUTION INTRAMUSCULAR; INTRAVENOUS; SUBCUTANEOUS AS NEEDED
Status: ACTIVE | OUTPATIENT
Start: 2021-07-28 | End: 2021-07-28

## 2021-07-28 RX ORDER — MAGNESIUM SULFATE HEPTAHYDRATE 40 MG/ML
2 INJECTION, SOLUTION INTRAVENOUS ONCE
Status: COMPLETED | OUTPATIENT
Start: 2021-07-28 | End: 2021-07-28

## 2021-07-28 RX ORDER — SODIUM CHLORIDE 9 MG/ML
INJECTION, SOLUTION INTRAVENOUS CONTINUOUS PRN
Status: DISCONTINUED | OUTPATIENT
Start: 2021-07-28 | End: 2021-07-28 | Stop reason: SURG

## 2021-07-28 RX ORDER — HYDROMORPHONE HYDROCHLORIDE 1 MG/ML
0.4 INJECTION, SOLUTION INTRAMUSCULAR; INTRAVENOUS; SUBCUTANEOUS EVERY 5 MIN PRN
Status: ACTIVE | OUTPATIENT
Start: 2021-07-28 | End: 2021-07-28

## 2021-07-28 RX ORDER — MIDAZOLAM HYDROCHLORIDE 1 MG/ML
INJECTION INTRAMUSCULAR; INTRAVENOUS AS NEEDED
Status: DISCONTINUED | OUTPATIENT
Start: 2021-07-28 | End: 2021-07-28 | Stop reason: SURG

## 2021-07-28 RX ORDER — SIMETHICONE 125 MG
125 TABLET,CHEWABLE ORAL 4 TIMES DAILY PRN
Status: DISCONTINUED | OUTPATIENT
Start: 2021-07-28 | End: 2021-08-02

## 2021-07-28 RX ORDER — SIMETHICONE 80 MG
160 TABLET,CHEWABLE ORAL 4 TIMES DAILY PRN
Status: DISCONTINUED | OUTPATIENT
Start: 2021-07-28 | End: 2021-08-02

## 2021-07-28 RX ORDER — CLONIDINE HYDROCHLORIDE 0.1 MG/1
0.1 TABLET ORAL 2 TIMES DAILY PRN
Status: DISCONTINUED | OUTPATIENT
Start: 2021-07-28 | End: 2021-08-02

## 2021-07-28 RX ORDER — LABETALOL HYDROCHLORIDE 5 MG/ML
20 INJECTION, SOLUTION INTRAVENOUS ONCE
Status: COMPLETED | OUTPATIENT
Start: 2021-07-28 | End: 2021-07-28

## 2021-07-28 RX ADMIN — MIDAZOLAM HYDROCHLORIDE 2 MG: 1 INJECTION INTRAMUSCULAR; INTRAVENOUS at 08:35:00

## 2021-07-28 RX ADMIN — SODIUM CHLORIDE: 9 INJECTION, SOLUTION INTRAVENOUS at 08:27:00

## 2021-07-28 RX ADMIN — KETAMINE HYDROCHLORIDE 10 MG: 50 INJECTION, SOLUTION, CONCENTRATE INTRAMUSCULAR; INTRAVENOUS at 08:42:00

## 2021-07-28 RX ADMIN — SODIUM CHLORIDE: 9 INJECTION, SOLUTION INTRAVENOUS at 08:22:00

## 2021-07-28 RX ADMIN — KETAMINE HYDROCHLORIDE 10 MG: 50 INJECTION, SOLUTION, CONCENTRATE INTRAMUSCULAR; INTRAVENOUS at 08:38:00

## 2021-07-28 RX ADMIN — MIDAZOLAM HYDROCHLORIDE 2 MG: 1 INJECTION INTRAMUSCULAR; INTRAVENOUS at 08:50:00

## 2021-07-28 NOTE — DIETARY NOTE
[facility]    NUTRITION ASSESSMENT    Pt does not meet malnutrition criteria at this time. NUTRITION INTERVENTION:     1. Meal and Snacks - diet advancement per Surgery and SLP, when appropriate.     2. Parenteral Nutrition - via PICC line    *Next 106.6 kg (235 lb)  05/22/19 : 104.3 kg (230 lb)  03/29/19 : 94.7 kg (208 lb 12.4 oz)  03/18/19 : 94.4 kg (208 lb 1.8 oz)  02/08/19 : 97.5 kg (215 lb)  09/24/18 : 102.1 kg (225 lb)  08/29/18 : 102.1 kg (225 lb)     NUTRITION:  Diet: NPO + TPN   Oral Supplem

## 2021-07-28 NOTE — PROGRESS NOTES
[facility]  Progress Note    Jackie Garces Patient Status:  Inpatient    1955 MRN [id number]   [facility] 4SW-A Attending Doug Vieira MD   [facility] Day # 9 PCP Karan Gu MD     Subjective:   The patient has just returned fr ulcer with visible vessel s/p endotherapy, esophagitis, hiatal hernia, Abhijeet's ulcer and erosions. NG tube removed and recommend leaving out  PPD 0 partial first ray amputation of right great toe    Plan:  1. Continue critical care management  2.  Awaitin

## 2021-07-28 NOTE — PROGRESS NOTES
[facility]  Progress Note     Lisseth Range Patient Status:  Inpatient    1955 MRN [id number]   [facility] 3NW-A Attending Fanny Salmeron MD   [facility] Day # 5 PCP Faustino Morel MD     Chief Complaint: abd pain    S: Pt underw in this interval not displayed.        Recent Labs   Lab 07/26/21  0524 07/26/21  1738 07/27/21  0450 07/27/21  1740 07/28/21  0648   *  --  220*  --  141*   BUN 29*  --  20*  --  13   CREATSERUM 1.01  --  0.77  --  0.65*   GFRAA 90  --  111  --  119 (Porcine)  5,000 Units Subcutaneous Q8H [facility]   • Insulin Aspart Pen  1-10 Units Subcutaneous Q6H       ASSESSMENT / PLAN:     1. Small Bowel Obstruction  2. GI bleed, gastric ulcer, esophagitis, Abhijeet's ulcer, gastric erosions  1. Surgery following  2.  IV

## 2021-07-28 NOTE — PLAN OF CARE
Communicated with Dr. Matthew Escamilla. I'm not going to see the patient for his delirium. He just had surgery and received opioids. Delirium will be addressed by primary service.      Dr. Anna Fraser

## 2021-07-28 NOTE — PROGRESS NOTES
[facility] Pulmonary/Critical Care Progress Note     SUBJECTIVE/Interval history: All events, procedures, notes reviewed. Pt denies cp or sob.  Pt is s/p partial ray amputation right great tose      Review of Systems: 7.9*   *  --  146*  --  141   K 2.9*   < > 3.2* 3.4* 3.6   *  --  115*  --  112   CO2 29.0  --  28.0  --  24.0    < > = values in this interval not displayed.      Recent Labs   Lab 07/25/21  0536 07/25/21  1612 07/26/21  0524 07/26/21  0524 07/ (CST): Linda Alarcon MD on 7/28/2021 at 7:24 AM       XR CHEST AP PORTABLE  (CPT=71045)    Result Date: 7/25/2021  CONCLUSION:  The NG tube is seen in the proximal stomach well below the diaphragm ready for use.     Dictated by (CST): Jose Adams MD off pressors. · S/p right hallus partial ray amputuaion 7/28  · Hemorrhagic anemia. Improved now On ppi bid.  Gastric ulcer cauterized 7/26 with 3 hemostatic clips placed  · Ileus: stop precedex (can worsen ileus)/ try dulcolax suppository and po to stimul

## 2021-07-28 NOTE — BRIEF OP NOTE
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

## 2021-07-28 NOTE — PLAN OF CARE
Assume care in am. Patient sent to Surgery for scheduled R toe amputation. 1100-back from surgery, drowsy. Vital signs monitored. Cardene gtt double concentrated.

## 2021-07-28 NOTE — PROGRESS NOTES
07/28/21 0728   Respiratory Therapy / Neb Tx   $ RT Standby Charge (per 15 min) 1   $ Subsequent Tx Charge Nebulizer  (Pt refusing metaneb tried many times)   MD Order ventolin   Pre-Treatment Pulse 96   Pre-Treatment Respirations 20   Pre-Treatment O2

## 2021-07-28 NOTE — ANESTHESIA POSTPROCEDURE EVALUATION
[id number] [facility] Patient Status:  Inpatient   Age/Gender 61year old male MRN [id number]   [facility] SURGERY Attending Miles Tolbert, [address] [facility] [address] Day # 9 PCP Mona Magaña MD       Anesthesia Post-op Note    PARTIAL FIRST

## 2021-07-28 NOTE — PHYSICAL THERAPY NOTE
Order received for PT eval and chart reviewed. Pt currently off floor for revision of R hallux amputation. Will continue to follow. Followed up this afternoon for PT eval, however, Pt declining OOB activity at this time. Will continue to follow.

## 2021-07-28 NOTE — OCCUPATIONAL THERAPY NOTE
Orders for OT services received, chart reviewed. Upon arrival, pt not willing to participate in therapy at this time. OT to follow up as schedule allows.

## 2021-07-28 NOTE — PLAN OF CARE
Assumed care of pt for the night shift. Pt alert and oriented x4. Maintaining adequate O2 saturations on RA. Complaint of abdominal pain treated PRN with little relief, Charo SCHWARTZ aware.  Pt with persistent hypertension, Charo SCHWARTZ aware, Nicardipine

## 2021-07-29 ENCOUNTER — APPOINTMENT (OUTPATIENT)
Dept: GENERAL RADIOLOGY | Facility: HOSPITAL | Age: 66
DRG: 335 | End: 2021-07-29
Attending: NURSE PRACTITIONER
Payer: MEDICARE

## 2021-07-29 LAB
ALBUMIN SERPL-MCNC: 1.9 G/DL (ref 3.4–5)
ALBUMIN/GLOB SERPL: 0.5 {RATIO} (ref 1–2)
ALP LIVER SERPL-CCNC: 111 U/L
ALT SERPL-CCNC: 26 U/L
ANION GAP SERPL CALC-SCNC: 6 MMOL/L (ref 0–18)
AST SERPL-CCNC: 21 U/L (ref 15–37)
BASOPHILS # BLD AUTO: 0.02 X10(3) UL (ref 0–0.2)
BASOPHILS NFR BLD AUTO: 0.2 %
BILIRUB SERPL-MCNC: 0.8 MG/DL (ref 0.1–2)
BUN BLD-MCNC: 13 MG/DL (ref 7–18)
BUN/CREAT SERPL: 19.4 (ref 10–20)
CALCIUM BLD-MCNC: 7.8 MG/DL (ref 8.5–10.1)
CHLORIDE SERPL-SCNC: 107 MMOL/L (ref 98–112)
CO2 SERPL-SCNC: 26 MMOL/L (ref 21–32)
CREAT BLD-MCNC: 0.67 MG/DL
DEPRECATED RDW RBC AUTO: 51.1 FL (ref 35.1–46.3)
EOSINOPHIL # BLD AUTO: 0.1 X10(3) UL (ref 0–0.7)
EOSINOPHIL NFR BLD AUTO: 0.8 %
ERYTHROCYTE [DISTWIDTH] IN BLOOD BY AUTOMATED COUNT: 15.8 % (ref 11–15)
GLOBULIN PLAS-MCNC: 4.2 G/DL (ref 2.8–4.4)
GLUCOSE BLD-MCNC: 168 MG/DL (ref 70–99)
GLUCOSE BLD-MCNC: 213 MG/DL (ref 70–99)
GLUCOSE BLD-MCNC: 219 MG/DL (ref 70–99)
GLUCOSE BLD-MCNC: 220 MG/DL (ref 70–99)
HAV IGM SER QL: 1.8 MG/DL (ref 1.6–2.6)
HCT VFR BLD AUTO: 24.7 %
HGB BLD-MCNC: 7.9 G/DL
IMM GRANULOCYTES # BLD AUTO: 0.14 X10(3) UL (ref 0–1)
IMM GRANULOCYTES NFR BLD: 1.1 %
LYMPHOCYTES # BLD AUTO: 0.74 X10(3) UL (ref 1–4)
LYMPHOCYTES NFR BLD AUTO: 5.7 %
M PROTEIN MFR SERPL ELPH: 6.1 G/DL (ref 6.4–8.2)
MCH RBC QN AUTO: 28.8 PG (ref 26–34)
MCHC RBC AUTO-ENTMCNC: 32 G/DL (ref 31–37)
MCV RBC AUTO: 90.1 FL
MONOCYTES # BLD AUTO: 0.52 X10(3) UL (ref 0.1–1)
MONOCYTES NFR BLD AUTO: 4 %
NEUTROPHILS # BLD AUTO: 11.4 X10 (3) UL (ref 1.5–7.7)
NEUTROPHILS # BLD AUTO: 11.4 X10(3) UL (ref 1.5–7.7)
NEUTROPHILS NFR BLD AUTO: 88.2 %
OSMOLALITY SERPL CALC.SUM OF ELEC: 294 MOSM/KG (ref 275–295)
PHOSPHATE SERPL-MCNC: 2.8 MG/DL (ref 2.5–4.9)
PLATELET # BLD AUTO: 176 10(3)UL (ref 150–450)
POTASSIUM SERPL-SCNC: 3.2 MMOL/L (ref 3.5–5.1)
RBC # BLD AUTO: 2.74 X10(6)UL
SODIUM SERPL-SCNC: 139 MMOL/L (ref 136–145)
TRIGL SERPL-MCNC: 235 MG/DL (ref 30–149)
WBC # BLD AUTO: 12.9 X10(3) UL (ref 4–11)

## 2021-07-29 PROCEDURE — 71045 X-RAY EXAM CHEST 1 VIEW: CPT | Performed by: NURSE PRACTITIONER

## 2021-07-29 PROCEDURE — 99233 SBSQ HOSP IP/OBS HIGH 50: CPT | Performed by: INTERNAL MEDICINE

## 2021-07-29 RX ORDER — HYDROCODONE BITARTRATE AND ACETAMINOPHEN 5; 325 MG/1; MG/1
1 TABLET ORAL EVERY 4 HOURS PRN
Status: DISCONTINUED | OUTPATIENT
Start: 2021-07-29 | End: 2021-07-29

## 2021-07-29 RX ORDER — HYDROCODONE BITARTRATE AND ACETAMINOPHEN 5; 325 MG/1; MG/1
2 TABLET ORAL EVERY 4 HOURS PRN
Status: DISCONTINUED | OUTPATIENT
Start: 2021-07-29 | End: 2021-07-29

## 2021-07-29 RX ORDER — SODIUM PHOSPHATE, DIBASIC AND SODIUM PHOSPHATE, MONOBASIC 7; 19 G/133ML; G/133ML
1 ENEMA RECTAL ONCE AS NEEDED
Status: DISCONTINUED | OUTPATIENT
Start: 2021-07-29 | End: 2021-08-02

## 2021-07-29 RX ORDER — CLONIDINE 0.1 MG/24H
1 PATCH, EXTENDED RELEASE TRANSDERMAL WEEKLY
Status: DISCONTINUED | OUTPATIENT
Start: 2021-07-29 | End: 2021-08-02

## 2021-07-29 RX ORDER — HYDROCODONE BITARTRATE AND ACETAMINOPHEN 5; 325 MG/1; MG/1
1 TABLET ORAL EVERY 4 HOURS PRN
Status: DISCONTINUED | OUTPATIENT
Start: 2021-07-29 | End: 2021-07-31

## 2021-07-29 RX ORDER — LISINOPRIL 20 MG/1
20 TABLET ORAL DAILY
Status: DISCONTINUED | OUTPATIENT
Start: 2021-07-29 | End: 2021-08-02

## 2021-07-29 RX ORDER — DOCUSATE SODIUM 100 MG/1
100 CAPSULE, LIQUID FILLED ORAL 2 TIMES DAILY
Status: DISCONTINUED | OUTPATIENT
Start: 2021-07-29 | End: 2021-08-02

## 2021-07-29 RX ORDER — POLYETHYLENE GLYCOL 3350 17 G/17G
17 POWDER, FOR SOLUTION ORAL DAILY PRN
Status: DISCONTINUED | OUTPATIENT
Start: 2021-07-29 | End: 2021-08-02

## 2021-07-29 RX ORDER — LABETALOL HYDROCHLORIDE 5 MG/ML
20 INJECTION, SOLUTION INTRAVENOUS ONCE
Status: COMPLETED | OUTPATIENT
Start: 2021-07-29 | End: 2021-07-29

## 2021-07-29 RX ORDER — PREGABALIN 25 MG/1
75 CAPSULE ORAL NIGHTLY
Status: DISCONTINUED | OUTPATIENT
Start: 2021-07-29 | End: 2021-08-02

## 2021-07-29 RX ORDER — NIFEDIPINE 30 MG/1
30 TABLET, EXTENDED RELEASE ORAL DAILY
Status: DISCONTINUED | OUTPATIENT
Start: 2021-07-29 | End: 2021-08-02

## 2021-07-29 RX ORDER — BISACODYL 10 MG
10 SUPPOSITORY, RECTAL RECTAL
Status: DISCONTINUED | OUTPATIENT
Start: 2021-07-29 | End: 2021-08-02

## 2021-07-29 RX ORDER — HYDROCODONE BITARTRATE AND ACETAMINOPHEN 5; 325 MG/1; MG/1
2 TABLET ORAL EVERY 4 HOURS PRN
Status: DISCONTINUED | OUTPATIENT
Start: 2021-07-29 | End: 2021-07-31

## 2021-07-29 RX ORDER — MAGNESIUM SULFATE HEPTAHYDRATE 40 MG/ML
2 INJECTION, SOLUTION INTRAVENOUS ONCE
Status: COMPLETED | OUTPATIENT
Start: 2021-07-29 | End: 2021-07-29

## 2021-07-29 RX ORDER — HYDRALAZINE HYDROCHLORIDE 50 MG/1
50 TABLET, FILM COATED ORAL DAILY
Status: DISCONTINUED | OUTPATIENT
Start: 2021-07-29 | End: 2021-07-30

## 2021-07-29 RX ORDER — NIFEDIPINE 30 MG/1
30 TABLET, EXTENDED RELEASE ORAL DAILY
Status: DISCONTINUED | OUTPATIENT
Start: 2021-07-29 | End: 2021-07-29

## 2021-07-29 NOTE — PROGRESS NOTES
[facility] Pulmonary/Critical Care Progress Note     SUBJECTIVE/Interval history: All events, procedures, notes reviewed. Pt is requesting food. He has mild abdominal pain.      Review of Systems:   A comprehensive 14 po 146*  --   --  141 139   K 3.2*   < > 3.4* 3.6 3.2*   *  --   --  112 107   CO2 28.0  --   --  24.0 26.0    < > = values in this interval not displayed.      Recent Labs   Lab 07/27/21  0450 07/28/21  0648 07/29/21  0541   RBC 2.64* 2.89* 2.74*   HGB (CST):  Olga Nice MD on 7/28/2021 at 7:13 AM     Finalized by (CST): Olga Nice MD on 7/28/2021 at 7:24 AM         • potassium chloride  40 mEq Intravenous Once   • cloNIDine  1 patch Transdermal Weekly   • insulin aspart  1-5 Units Subcuta yesterday  · Ask gen surg is can start po meds and clear liquids  · Echo reviewed with no shunt and preserved ef.  · chemo improving with ivf  · On zosyn post op.  Stop after 7 days  · FEN: on tpn  · Proph: hep subcut  · D/w rn/apn  · cctime 40 min         Ra

## 2021-07-29 NOTE — OCCUPATIONAL THERAPY NOTE
Orders for OT services received, chart reviewed. Upon arrival, pt not willing to participate in therapy at this time.  OT to follow up as schedule allows

## 2021-07-29 NOTE — PROGRESS NOTES
[facility]  Progress Note    Harry Herrera Patient Status:  Inpatient    1955 MRN [id number]   [facility] 4SW-A Attending Roselia Fields MD   [facility] Day # 10 PCP Loretta Wakefield MD     Subjective:   The patient is resting comforta necrosis of bone) (HCC)     Unspecified internal derangement of knee     Shoulder arthritis     Aseptic necrosis of bone, site unspecified     S/P shoulder surgery-global 2/7/14     Syncope     Asthma exacerbation     Acute asthma     Hyperglycemia     Sev patient on the above listed diagnoses and treatment options.     Hanh Melendrez  7/29/2021  2:02 PM

## 2021-07-29 NOTE — CM/SW NOTE
Care Progression Note:  Active Acute Medical Issue:   Small bowel obstruction (HCC)   PERITONEAL ADHESIOLYSIS   Repair of fascial dehiscence reduction of small bowel into abdominal cavity 7/23.   S/P right hallus partial ray amputation 7/28  Gastric ulcer c

## 2021-07-29 NOTE — PROGRESS NOTES
[facility]  Progress Note     Dane Feeling Patient Status:  Inpatient    1955 MRN [id number]   [facility] 3NW-A Attending Ruben Case MD   [facility] Day # 8 PCP Antony Holm MD     Chief Complaint: abd pain    S: Pt witho 07/27/21  0450 07/27/21  0450 07/27/21  1740 07/28/21  0648 07/29/21  0541   *  --   --  141* 213*   BUN 20*  --   --  13 13   CREATSERUM 0.77  --   --  0.65* 0.67*   GFRAA 111  --   --  119 117   GFRNAA 96  --   --  103 102   CA 8.0*  --   --  7.9* Insulin Aspart Pen  1-10 Units Subcutaneous Q6H       ASSESSMENT / PLAN:     1. Small Bowel Obstruction  2. GI bleed, gastric ulcer, esophagitis, Abhijeet's ulcer, gastric erosions  1. Surgery following  2. IVF  3. Pain control  4. No BM's  5.  S/p ex lap, p

## 2021-07-29 NOTE — PHYSICAL THERAPY NOTE
Order received for PT eval. Attempted to see Pt this am, however, Pt refusing OOB activity despite encouragement. Will continue to follow, as patient participatory.

## 2021-07-29 NOTE — PROGRESS NOTES
07/29/21  1300   BP: (!) 164/58   Pulse: 95   Resp: 22   Temp:    Last temperature was 99.2    Objective: Dressing is clean dry and intact patient is complaining of pain.   Tissue cultures showing Proteus vulgaris Streptococcus group G and staph species no

## 2021-07-29 NOTE — PLAN OF CARE
Assumed care of pt for the night shift. Pt alert and oriented x4. Maintaining adequate O2 saturations on RA. Complaint of pain to abdomen and toe on R foot treated PRN, see MAR. Pt hypertensive on Nicardipine drip.  Hypertension also treated with PRN lopres

## 2021-07-29 NOTE — DIETARY NOTE
[address] [facility] - TPN FOLLOW UP    Kirti Cardoza     Parenteral Nutrition - via PICC line     *Next TPN (Bag#6) to provide: 785 dextrose calories (230 gm CHO), 600 lipid calories, 32% lipids, 120 gm protein (100% goal protein), and Tot

## 2021-07-30 LAB
ALBUMIN SERPL-MCNC: 2.2 G/DL (ref 3.4–5)
ALBUMIN/GLOB SERPL: 0.5 {RATIO} (ref 1–2)
ALP LIVER SERPL-CCNC: 144 U/L
ALT SERPL-CCNC: 37 U/L
ANION GAP SERPL CALC-SCNC: 6 MMOL/L (ref 0–18)
AST SERPL-CCNC: 29 U/L (ref 15–37)
BASOPHILS # BLD AUTO: 0.02 X10(3) UL (ref 0–0.2)
BASOPHILS NFR BLD AUTO: 0.1 %
BILIRUB SERPL-MCNC: 0.8 MG/DL (ref 0.1–2)
BUN BLD-MCNC: 12 MG/DL (ref 7–18)
CALCIUM BLD-MCNC: 8.5 MG/DL (ref 8.5–10.1)
CHLORIDE SERPL-SCNC: 112 MMOL/L (ref 98–112)
CO2 SERPL-SCNC: 26 MMOL/L (ref 21–32)
CREAT BLD-MCNC: 0.6 MG/DL
EOSINOPHIL # BLD AUTO: 0.12 X10(3) UL (ref 0–0.7)
EOSINOPHIL NFR BLD AUTO: 0.9 %
ERYTHROCYTE [DISTWIDTH] IN BLOOD BY AUTOMATED COUNT: 15.5 %
GLOBULIN PLAS-MCNC: 4.7 G/DL (ref 2.8–4.4)
GLUCOSE BLD-MCNC: 101 MG/DL (ref 70–99)
GLUCOSE BLD-MCNC: 102 MG/DL (ref 70–99)
GLUCOSE BLD-MCNC: 106 MG/DL (ref 70–99)
GLUCOSE BLD-MCNC: 119 MG/DL (ref 70–99)
GLUCOSE BLD-MCNC: 128 MG/DL (ref 70–99)
GLUCOSE BLD-MCNC: 141 MG/DL (ref 70–99)
GLUCOSE BLD-MCNC: 180 MG/DL (ref 70–99)
HAV IGM SER QL: 2.1 MG/DL (ref 1.6–2.6)
HCT VFR BLD AUTO: 26.4 %
HGB BLD-MCNC: 8.4 G/DL
IMM GRANULOCYTES # BLD AUTO: 0.14 X10(3) UL (ref 0–1)
IMM GRANULOCYTES NFR BLD: 1 %
LYMPHOCYTES # BLD AUTO: 1.06 X10(3) UL (ref 1–4)
LYMPHOCYTES NFR BLD AUTO: 7.9 %
M PROTEIN MFR SERPL ELPH: 6.9 G/DL (ref 6.4–8.2)
MCH RBC QN AUTO: 29 PG (ref 26–34)
MCHC RBC AUTO-ENTMCNC: 31.8 G/DL (ref 31–37)
MCV RBC AUTO: 91 FL
MONOCYTES # BLD AUTO: 0.55 X10(3) UL (ref 0.1–1)
MONOCYTES NFR BLD AUTO: 4.1 %
NEUTROPHILS # BLD AUTO: 11.47 X10 (3) UL (ref 1.5–7.7)
NEUTROPHILS # BLD AUTO: 11.47 X10(3) UL (ref 1.5–7.7)
NEUTROPHILS NFR BLD AUTO: 86 %
OSMOLALITY SERPL CALC.SUM OF ELEC: 298 MOSM/KG (ref 275–295)
PHOSPHATE SERPL-MCNC: 2.9 MG/DL (ref 2.5–4.9)
PLATELET # BLD AUTO: 196 10(3)UL (ref 150–450)
POTASSIUM SERPL-SCNC: 3.5 MMOL/L (ref 3.5–5.1)
RBC # BLD AUTO: 2.9 X10(6)UL
SODIUM SERPL-SCNC: 144 MMOL/L (ref 136–145)
WBC # BLD AUTO: 13.4 X10(3) UL (ref 4–11)

## 2021-07-30 PROCEDURE — 99233 SBSQ HOSP IP/OBS HIGH 50: CPT | Performed by: INTERNAL MEDICINE

## 2021-07-30 PROCEDURE — 99024 POSTOP FOLLOW-UP VISIT: CPT | Performed by: STUDENT IN AN ORGANIZED HEALTH CARE EDUCATION/TRAINING PROGRAM

## 2021-07-30 RX ORDER — PANTOPRAZOLE SODIUM 40 MG/1
40 TABLET, DELAYED RELEASE ORAL
Status: DISCONTINUED | OUTPATIENT
Start: 2021-07-30 | End: 2021-08-02

## 2021-07-30 RX ORDER — HYDRALAZINE HYDROCHLORIDE 50 MG/1
50 TABLET, FILM COATED ORAL EVERY 8 HOURS SCHEDULED
Status: DISCONTINUED | OUTPATIENT
Start: 2021-07-30 | End: 2021-08-02

## 2021-07-30 RX ORDER — ZOLPIDEM TARTRATE 5 MG/1
5 TABLET ORAL ONCE AS NEEDED
Status: ACTIVE | OUTPATIENT
Start: 2021-07-30 | End: 2021-07-30

## 2021-07-30 NOTE — PROGRESS NOTES
07/30/21  1300   BP: 142/90   Pulse: 115   Resp: 24   Temp:        Subjective: Patient was seen at bedside s/p partial first ray amputation of right foot (DOS: 7/28/21). Patient relays that he is without pain to his foot.  He has kept dressings clean, dry,

## 2021-07-30 NOTE — PLAN OF CARE
Report received from previous RN. Pt A&O x 4 very restless, unable to get comfortable medicated with prn meds. Unable to sleep during the night. Pt just fidgeting the whole night. Medicated with dilaudid and xanax  without any relief of fidgeting.  PRN Car

## 2021-07-30 NOTE — DIETARY NOTE
[facility]    NUTRITION ASSESSMENT    Pt does not meet malnutrition criteria at this time. NUTRITION INTERVENTION:     1. Meal and Snacks - diet advancement per Surgery.     2. Parenteral Nutrition - via PICC line    *Next TPN (Bag # 7) to provide: 0148 89.1 kg (196 lb 6.9 oz)   07/19/21 0700 102.1 kg (225 lb 1.4 oz)       Wt Readings from Last 10 Encounters:  07/30/21 : 93.5 kg (206 lb 2.1 oz)  07/11/21 : 102.1 kg (225 lb)  09/14/19 : 106.6 kg (235 lb 0.2 oz)  06/01/19 : 106.6 kg (235 lb)  05/22/19 management.  -Full note due: 8/3    Flavio Grimes MS, RD, LDN  Clinical Dietitian  Pager# [phone]

## 2021-07-30 NOTE — OCCUPATIONAL THERAPY NOTE
OCCUPATIONAL THERAPY EVALUATION - INPATIENT     Room Number: 461/461-A  Evaluation Date: 7/30/2021  Type of Evaluation: Initial  Presenting Problem: SBO s/p repair fascial dehiscense reduction small bowel into abdominal cavity; s/p right great toe amputati admit:  7/11-7/18/21= SBO s/p ex-lap 7/12/21, 1st ray amputation= left AMA    PMH significant for pneumonia, HTN, ETOH and drug abuse  Problem List  Principal Problem:    Small bowel obstruction ([address]  Active Problems:    Essential hypertension    Leukocyt Repositioning    COGNITION  Attention Span:  difficulty attending to directions  Orientation Level:  oriented to place, oriented to time, oriented to situation and oriented to person  Memory:  impaired working memory, decreased recall of precautions and de role, goals, plan of care, recommendations and safety. Patient needs frequent cues to stay on task. Patient easily distracted and needs frequent redirection.   Supine to EOB with mod A, cues for safety  EOB sitting with initial min A d/t posterior lean; p maximizing patient’s ability to return safely to his prior level of function. Subacute rehab is recommended for 12-14 days. After this period of rehabilitation patient should achieve supervision level in BADLs.       Patient Complexity  Occupational Pro

## 2021-07-30 NOTE — PROGRESS NOTES
[facility]  Progress Note    Janet Lange Patient Status:  Inpatient    1955 MRN [id number]   [facility] 4SW-A Attending Angelo Flores MD   [facility] Day # 11 PCP Inessa Barone MD     Subjective:   The patient is resting in bed, Unspecified internal derangement of knee     Shoulder arthritis     Aseptic necrosis of bone, site unspecified     S/P shoulder surgery-global 2/7/14     Syncope     Asthma exacerbation     Acute asthma     Hyperglycemia     Severe persistent asthma with a Lyndsay  7/30/2021  11:02 AM    Addendum:  Dr. Cantu Faster    I have reviewed the above note and evaluation by the physician assistant.  I agree with her physical exam and the data listed in the report, and I have made any relevant changes in editing her n

## 2021-07-30 NOTE — PROGRESS NOTES
[facility] Pulmonary/Critical Care Progress Note     SUBJECTIVE/Interval history:  No acute events overnight, did not sleep overnight. he c/o toe pain today and feels fatigued. No chest pain/pressure.  no dyspnea or coug 26.0 26.0     Recent Labs   Lab 07/28/21  0648 07/29/21  0541 07/30/21  0630   RBC 2.89* 2.74* 2.90*   HGB 8.3* 7.9* 8.4*   HCT 26.6* 24.7* 26.4*   MCV 92.0 90.1 91.0   MCH 28.7 28.8 29.0   MCHC 31.2 32.0 31.8   RDW 16.1* 15.8* 15.5   NEPRELIM 10.25* 11.40 Crystal Sayres, NITRITE, LEUUR, WBCUR, RBCUR, BACUR, EPIUR in the last 168 hours. Interval Radiology:   CT ABDOMEN+PELVIS(CONTRAST ONLY)(CPT=74177)    Result Date: 7/28/2021  CONCLUSION:  1. Postsurgical changes are noted. Postsurgical drain seen. Tartrate, hydrALAzine HCl, glucose **OR** glucose-vitamin C **OR** dextrose **OR** glucose **OR** glucose-vitamin C, Albuterol Sulfate HFA, alprazolam, HYDROmorphone HCl **OR** HYDROmorphone HCl **OR** HYDROmorphone HCl, ondansetron    ASSESSMENT  · Acute

## 2021-07-30 NOTE — PROGRESS NOTES
[facility]  Progress Note     Vibha Come Patient Status:  Inpatient    1955 MRN [id number]   [facility] 3NW-A Attending Hussain Gautam MD   [address] Day # 6 PCP Emmanuel Duncan MD     Chief Complaint: abd pain    S: Pt witho 213* 101*   BUN 13 13 12   CREATSERUM 0.65* 0.67* 0.60*   GFRAA 119 117 123   GFRNAA 103 102 106   CA 7.9* 7.8* 8.5   ALB 2.1* 1.9* 2.2*    139 144   K 3.6 3.2* 3.5    107 112   CO2 24.0 26.0 26.0   ALKPHO 126* 111 144*   AST 24 21 29   ALT 25 control  4. S/p ex lap, partial bowel resection, fascial dehiscence. NISHA in place. 5. TPN, started on diet  6. Visibile vessel w/ gastric ulcer s/p epi, cautery, and clips x 3  3. Acute hypoxic/hypercapnic resp failure  1. Aspiration? Mucus plug on CT  2.

## 2021-07-30 NOTE — PLAN OF CARE
Pt received alert. Orientation x3 but improved to x4. Following commands. PRN pain medications given for pain. PRN Norco added. Pt requested nasal cannula. Placed on 2L. Afebrile. Received on Cardene drip. Titrated off. PO medications restarted.  Received w

## 2021-07-30 NOTE — PHYSICAL THERAPY NOTE
PHYSICAL THERAPY EVALUATION - INPATIENT     Room Number: 461/461-A  Evaluation Date: 7/30/2021  Type of Evaluation: Initial  Physician Order: PT Eval and Treat    Presenting Problem: s/p R hallux amputation revision  Reason for Therapy: Mobility Dysf Prior Level of Casey: Pt lives with significant other and 10/12 y.o. children. Pt typically independent with ADL and mobility. Pt does not use RW/cane. SUBJECTIVE  \"I know I can't stand. Lift me up. \"     Patient self-stated goal is to retu (including a wheelchair)?: Total   -   Need to walk in hospital room?: Total   -   Climbing 3-5 steps with a railing?: Total       AM-PAC Score:  Raw Score: 8   Approx Degree of Impairment: 86.62%   Standardized Score (AM-PAC Scale): 28.58   CMS Modifier ( with the following impairments limited endurance, strength deficits, poor trunk control, balance impairments, decreased attention, cognitive impairments, and decreased ability to maintain NWB on R LE. Functional outcome measures completed include [facility].  Ba

## 2021-07-30 NOTE — PLAN OF CARE
Pt received alert and oriented. Following commands. Pt not noted sleeping during shift. Pt reported pain, PRN Plush given. On room air. Afebrile. Hypertensive. PRN Hydralazine given. Sinus Tachycardia on telemetry. Clear liquid diet. BMx1. TPN infusing.  V

## 2021-07-31 LAB
ALBUMIN SERPL-MCNC: 2.2 G/DL (ref 3.4–5)
ALBUMIN/GLOB SERPL: 0.5 {RATIO} (ref 1–2)
ALP LIVER SERPL-CCNC: 159 U/L
ALT SERPL-CCNC: 44 U/L
ANION GAP SERPL CALC-SCNC: 5 MMOL/L (ref 0–18)
AST SERPL-CCNC: 26 U/L (ref 15–37)
BASOPHILS # BLD AUTO: 0.01 X10(3) UL (ref 0–0.2)
BASOPHILS NFR BLD AUTO: 0.1 %
BILIRUB SERPL-MCNC: 0.8 MG/DL (ref 0.1–2)
BUN BLD-MCNC: 12 MG/DL (ref 7–18)
C DIFF TOX B STL QL: NEGATIVE
CALCIUM BLD-MCNC: 8.1 MG/DL (ref 8.5–10.1)
CHLORIDE SERPL-SCNC: 108 MMOL/L (ref 98–112)
CO2 SERPL-SCNC: 26 MMOL/L (ref 21–32)
CREAT BLD-MCNC: 0.62 MG/DL
EOSINOPHIL # BLD AUTO: 0.09 X10(3) UL (ref 0–0.7)
EOSINOPHIL NFR BLD AUTO: 0.8 %
ERYTHROCYTE [DISTWIDTH] IN BLOOD BY AUTOMATED COUNT: 15.5 %
GLOBULIN PLAS-MCNC: 4.6 G/DL (ref 2.8–4.4)
GLUCOSE BLD-MCNC: 190 MG/DL (ref 70–99)
GLUCOSE BLD-MCNC: 191 MG/DL (ref 70–99)
GLUCOSE BLD-MCNC: 195 MG/DL (ref 70–99)
GLUCOSE BLD-MCNC: 224 MG/DL (ref 70–99)
GLUCOSE BLD-MCNC: 256 MG/DL (ref 70–99)
HAV IGM SER QL: 1.9 MG/DL (ref 1.6–2.6)
HCT VFR BLD AUTO: 24.7 %
HGB BLD-MCNC: 7.9 G/DL
IMM GRANULOCYTES # BLD AUTO: 0.07 X10(3) UL (ref 0–1)
IMM GRANULOCYTES NFR BLD: 0.6 %
LYMPHOCYTES # BLD AUTO: 0.74 X10(3) UL (ref 1–4)
LYMPHOCYTES NFR BLD AUTO: 6.6 %
M PROTEIN MFR SERPL ELPH: 6.8 G/DL (ref 6.4–8.2)
MCH RBC QN AUTO: 28.9 PG (ref 26–34)
MCHC RBC AUTO-ENTMCNC: 32 G/DL (ref 31–37)
MCV RBC AUTO: 90.5 FL
MONOCYTES # BLD AUTO: 0.37 X10(3) UL (ref 0.1–1)
MONOCYTES NFR BLD AUTO: 3.3 %
NEUTROPHILS # BLD AUTO: 9.91 X10 (3) UL (ref 1.5–7.7)
NEUTROPHILS # BLD AUTO: 9.91 X10(3) UL (ref 1.5–7.7)
NEUTROPHILS NFR BLD AUTO: 88.6 %
OSMOLALITY SERPL CALC.SUM OF ELEC: 293 MOSM/KG (ref 275–295)
PHOSPHATE SERPL-MCNC: 3.2 MG/DL (ref 2.5–4.9)
PLATELET # BLD AUTO: 210 10(3)UL (ref 150–450)
POTASSIUM SERPL-SCNC: 3.7 MMOL/L (ref 3.5–5.1)
RBC # BLD AUTO: 2.73 X10(6)UL
SODIUM SERPL-SCNC: 139 MMOL/L (ref 136–145)
WBC # BLD AUTO: 11.2 X10(3) UL (ref 4–11)

## 2021-07-31 PROCEDURE — 99232 SBSQ HOSP IP/OBS MODERATE 35: CPT | Performed by: INTERNAL MEDICINE

## 2021-07-31 RX ORDER — HALOPERIDOL 5 MG/ML
5 INJECTION INTRAMUSCULAR EVERY 6 HOURS PRN
Status: DISCONTINUED | OUTPATIENT
Start: 2021-07-31 | End: 2021-08-02

## 2021-07-31 RX ORDER — OXYCODONE HYDROCHLORIDE 5 MG/1
TABLET ORAL EVERY 4 HOURS PRN
Status: DISCONTINUED | OUTPATIENT
Start: 2021-07-31 | End: 2021-08-02

## 2021-07-31 RX ORDER — ACETAMINOPHEN 500 MG
1000 TABLET ORAL EVERY 8 HOURS
Status: DISCONTINUED | OUTPATIENT
Start: 2021-07-31 | End: 2021-08-02

## 2021-07-31 RX ORDER — HALOPERIDOL 5 MG/ML
4 INJECTION INTRAMUSCULAR ONCE
Status: COMPLETED | OUTPATIENT
Start: 2021-07-31 | End: 2021-07-31

## 2021-07-31 NOTE — CM/SW NOTE
TRI spoke with pt's spouse, Allen Ray about the LORRIE recommendation. Allen Ray is potentially in agreement, but agreeable to having LORRIE referrals sent. Allen Ray did state that she does not have an email. DON called.  Will need to provide choice to pt/family once list is

## 2021-07-31 NOTE — PROGRESS NOTES
[facility]  Progress Note     Jackie Garces Patient Status:  Inpatient    1955 MRN [id number]   [facility] 3NW-A Attending Doug Vieira MD   [facility] Day # 15 PCP Karan Gu MD     Chief Complaint: abd pain    S: Pt witho Estimated Creatinine Clearance: 147.8 mL/min (A) (based on SCr of 0.62 mg/dL (L)). No results for input(s): PTP, INR in the last 168 hours.          COVID-19 Lab Results    COVID-19  Lab Results   Component Value Date    COVID19 Not Detected 07/26/ RA  4. Shock  1. resolved  2. levophed gtt, weaned off  5. Recent R toe infection  1. Continue post op care, NWB on R foot   2. Podiatry following  3. S/p R great to amputation 7/28  4. Finished abx, monitor off abx  6.  Metabolic Alkalosis  1. ? From GI lo

## 2021-07-31 NOTE — PROGRESS NOTES
[facility]  Progress Note    Lisseth Range Patient Status:  Inpatient    1955 MRN [id number]   [facility] 4SW-A Attending Fanny Salmeron MD   [facility] Day # 12 PCP Faustino Morel MD     Subjective:   The patient is resting in bed, AVN (avascular necrosis of bone) (HCC)     Unspecified internal derangement of knee     Shoulder arthritis     Aseptic necrosis of bone, site unspecified     S/P shoulder surgery-global 2/7/14     Syncope     Asthma exacerbation     Acute asthma     Hyperg heparin  6. GI prophylaxis with twice daily Protonix      Tuyet Stratton Pa-C  7/31/2021  11:02 AM    ADDENDUM:     Patient was seen and examined. He would like to eat some solid food. He is having bowel movements. He is tolerating clear liquid diet.

## 2021-07-31 NOTE — PROGRESS NOTES
[facility]  Progress Note    Kelly Bennett Patient Status:  Inpatient    1955 MRN [id number]   [facility] 3NW-A Attending Rodo Devries MD   [facility] Day # 12 PCP Andrea Cantu MD     Subjective:  Kelly Bennett is a(n) 61 year o Arterial Line   DEV    GB 6.3*       Invalid input(s): Jatin Jane, [address], [id number]    COVID-19 Lab Results    COVID-19  Lab Results   Component Value Date    COVID19 Not Detected 07/26/2021    COVID19 Not Detected 07/11/2021       Pro-C 125 mg, 125 mg, Oral, QID PRN   Or  simethicone (MYLICON) chewable tab 624 mg, 160 mg, Oral, QID PRN  cloNIDine (CATAPRES) tab 0.1 mg, 0.1 mg, Oral, BID PRN  insulin detemir (LEVEMIR) 100 UNIT/ML flextouch 32 Units, 32 Units, Subcutaneous, Nightly  bisacod respiratory failure, intubated postop now extubated 7/25  · Gastric ulcer and Grade B esophagitis: s/p cauterization 7/26  · History of recurrent small bowel obstruction status post surgery 7/12 now with prolonged ileus evidence of improvement  · S/p small

## 2021-07-31 NOTE — PLAN OF CARE
Problem: Patient/Family Goals  Goal: Patient/Family Short Term Goal  Description: Patient's Short Term Goal: Comfort    Interventions:   -PRN pain medication  - See additional Care Plan goals for specific interventions  Outcome: Progressing     Problem: nonpharmacologic comfort measures as appropriate  - Advance diet as tolerated, if ordered  - Obtain nutritional consult as needed  - Evaluate fluid balance  Outcome: Progressing  Goal: Maintains or returns to baseline bowel function  Description: INTERVENT Maintains optimal cardiac output and hemodynamic stability  Description: INTERVENTIONS:  - Monitor vital signs, rhythm, and trends  - Monitor for bleeding, hypotension and signs of decreased cardiac output  - Evaluate effectiveness of vasoactive medication hyperglycemia and hypoglycemia  - Administer ordered medications to maintain glucose within target range  - Assess barriers to adequate nutritional intake and initiate nutrition consult as needed  - Instruct patient on self management of diabetes  Outcome: assist in orientation and promotion of home routines  Outcome: Not Progressing

## 2021-07-31 NOTE — PROGRESS NOTES
Alert & oriented to name and place only ,confused ,restless,forgetful . Tries to get out of bed on and off. Reoriented to time and situation as needed. Assisted with meals . Incontinent of stools and urine. Mid abdomen incision with staples and 5 retention sutu

## 2021-07-31 NOTE — DIETARY NOTE
[address] [facility] - TPN FOLLOW UP    Marilyn Pi Sony     Parenteral Nutrition - via PICC line     *Next TPN (Bag#7) to provide: 1122 dextrose calories (330 gm CHO), 600 lipid calories, 27% lipids, 120 gm protein (100% goal protein), and To

## 2021-08-01 LAB
ALBUMIN SERPL-MCNC: 2 G/DL (ref 3.4–5)
ALBUMIN/GLOB SERPL: 0.5 {RATIO} (ref 1–2)
ALP LIVER SERPL-CCNC: 179 U/L
ALT SERPL-CCNC: 45 U/L
ANION GAP SERPL CALC-SCNC: 4 MMOL/L (ref 0–18)
AST SERPL-CCNC: 21 U/L (ref 15–37)
BASOPHILS # BLD AUTO: 0.01 X10(3) UL (ref 0–0.2)
BASOPHILS NFR BLD AUTO: 0.1 %
BILIRUB SERPL-MCNC: 0.4 MG/DL (ref 0.1–2)
BUN BLD-MCNC: 9 MG/DL (ref 7–18)
CALCIUM BLD-MCNC: 8 MG/DL (ref 8.5–10.1)
CHLORIDE SERPL-SCNC: 107 MMOL/L (ref 98–112)
CO2 SERPL-SCNC: 28 MMOL/L (ref 21–32)
CREAT BLD-MCNC: 0.59 MG/DL
EOSINOPHIL # BLD AUTO: 0.14 X10(3) UL (ref 0–0.7)
EOSINOPHIL NFR BLD AUTO: 1.6 %
ERYTHROCYTE [DISTWIDTH] IN BLOOD BY AUTOMATED COUNT: 15.3 %
GLOBULIN PLAS-MCNC: 4.2 G/DL (ref 2.8–4.4)
GLUCOSE BLD-MCNC: 135 MG/DL (ref 70–99)
GLUCOSE BLD-MCNC: 154 MG/DL (ref 70–99)
GLUCOSE BLD-MCNC: 165 MG/DL (ref 70–99)
GLUCOSE BLD-MCNC: 174 MG/DL (ref 70–99)
GLUCOSE BLD-MCNC: 220 MG/DL (ref 70–99)
HAV IGM SER QL: 2 MG/DL (ref 1.6–2.6)
HCT VFR BLD AUTO: 24.9 %
HGB BLD-MCNC: 7.7 G/DL
IMM GRANULOCYTES # BLD AUTO: 0.06 X10(3) UL (ref 0–1)
IMM GRANULOCYTES NFR BLD: 0.7 %
LYMPHOCYTES # BLD AUTO: 1.09 X10(3) UL (ref 1–4)
LYMPHOCYTES NFR BLD AUTO: 12.6 %
M PROTEIN MFR SERPL ELPH: 6.2 G/DL (ref 6.4–8.2)
MCH RBC QN AUTO: 28.6 PG (ref 26–34)
MCHC RBC AUTO-ENTMCNC: 30.9 G/DL (ref 31–37)
MCV RBC AUTO: 92.6 FL
MONOCYTES # BLD AUTO: 0.34 X10(3) UL (ref 0.1–1)
MONOCYTES NFR BLD AUTO: 3.9 %
NEUTROPHILS # BLD AUTO: 7 X10 (3) UL (ref 1.5–7.7)
NEUTROPHILS # BLD AUTO: 7 X10(3) UL (ref 1.5–7.7)
NEUTROPHILS NFR BLD AUTO: 81.1 %
OSMOLALITY SERPL CALC.SUM OF ELEC: 289 MOSM/KG (ref 275–295)
PHOSPHATE SERPL-MCNC: 3.5 MG/DL (ref 2.5–4.9)
PLATELET # BLD AUTO: 231 10(3)UL (ref 150–450)
POTASSIUM SERPL-SCNC: 4 MMOL/L (ref 3.5–5.1)
RBC # BLD AUTO: 2.69 X10(6)UL
SODIUM SERPL-SCNC: 139 MMOL/L (ref 136–145)
WBC # BLD AUTO: 8.6 X10(3) UL (ref 4–11)

## 2021-08-01 PROCEDURE — 99232 SBSQ HOSP IP/OBS MODERATE 35: CPT | Performed by: INTERNAL MEDICINE

## 2021-08-01 NOTE — PROGRESS NOTES
[facility]  Progress Note     Syed Love Patient Status:  Inpatient    1955 MRN [id number]   [facility] 3NW-A Attending Jaqueline Coleman MD   [facility] Day # 15 PCP Latonia Sommers MD     Chief Complaint: abd pain    S: Pt witho mL/min (A) (based on SCr of 0.59 mg/dL (L)). No results for input(s): PTP, INR in the last 168 hours.          COVID-19 Lab Results    COVID-19  Lab Results   Component Value Date    COVID19 Not Detected 07/26/2021    COVID19 Not Detected 07/11/2021 off  5. Recent R toe infection  1. Continue post op care, NWB on R foot   2. Podiatry following  3. S/p R great to amputation 7/28  4. Finished abx, monitor off abx  5. Pain control  6. Metabolic Alkalosis  1. ? From GI losses from N/V and hypokalemia  2.

## 2021-08-01 NOTE — DIETARY NOTE
[address] [facility] - TPN FOLLOW UP    Mahesh Cardoza     Parenteral Nutrition - via PICC line     *Next TPN (Bag#8) to provide: 561 dextrose calories (165 gm CHO), 300 lipid calories, 27% lipids, 60 gm protein (100% goal protein), and Tota

## 2021-08-01 NOTE — PLAN OF CARE
Patient A/O X 2, confused, disorganized thinking, but cooperative at this time. 1:1 care companion at bedside. C/O right foot pain, rated 10/10, medicated with dilaudid. Ace wrap to right foot CDI. + CMS to right foot.  Patient states abdomen is \"sore,\" b pt frequently for physical needs  - Identify cognitive and physical deficits and behaviors that affect risk of falls.   - [place] fall precautions as indicated by assessment.  - Educate pt/family on patient safety including physical limitations  - Instruc appropriate  - Assist with meals as needed  - Monitor I&O, WT and lab values  - Obtain nutritional consult as needed  - Optimize oral hygiene and moisture  - Encourage food from home; allow for food preferences  - Enhance eating environment  8/1/2021 2011 RN  Outcome: Progressing  8/1/2021 0936 by Joanne Morocho RN  Outcome: Progressing     Problem: RESPIRATORY - ADULT  Goal: Achieves optimal ventilation and oxygenation  Description: INTERVENTIONS:  - Assess for changes in respiratory status  - Assess for ch RN  Outcome: Progressing  8/1/2021 0936 by Bernadette Jameson RN  Outcome: Progressing  Goal: Hemodynamic stability and optimal renal function maintained  Description: INTERVENTIONS:  - Monitor labs and assess for signs and symptoms of volume excess or deficit remain free from self-harm  Description: INTERVENTIONS:  - Apply the least restrictive restraint to prevent harm  - Notify patient and family of reasons restraints applied  - Assess for any contributing factors to confusion (electrolyte disturbances, delir

## 2021-08-01 NOTE — PROGRESS NOTES
[facility]  Progress Note    Benny Carpenter Patient Status:  Inpatient    1955 MRN [id number]   [facility] 4SW-A Attending Shalom Laughlin MD   [facility] Day # 15 PCP Nikky Land MD     Subjective:   The patient is resting in bed, incisional hernia with obstruction     AVN (avascular necrosis of bone) (HCC)     Unspecified internal derangement of knee     Shoulder arthritis     Aseptic necrosis of bone, site unspecified     S/P shoulder surgery-global 2/7/14     Syncope     Asthma e considering LORRIE, brief conversation with the patient today demonstrates that patient is less inclined to go to an LORRIE  6.  DVT prophylaxis with heparin  7. GI prophylaxis with twice daily Protonix      Xochilt Nolan Pa-C  8/1/2021  11:02 AM    ADDENDUM:

## 2021-08-01 NOTE — PLAN OF CARE
Patient A&Ox2, confused, disorganized thinking but pleasant. Hypertensive without symptoms. TPN continued, tolerating FLD. Midline incision dressing with old serous drainage. NISHA drain with no output this shift. Remains in vest restraints.  Night uneventful, injury  - Provide assistive devices as appropriate  - Consider OT/PT consult to assist with strengthening/mobility  - Encourage toileting schedule  Outcome: Progressing     Problem: GASTROINTESTINAL - ADULT  Goal: Minimal or absence of nausea and vomiting Diabetes/Glucose Control  Goal: Glucose maintained within prescribed range  Description: INTERVENTIONS:  - Monitor Blood Glucose as ordered  - Assess for signs and symptoms of hyperglycemia and hypoglycemia  - Administer ordered medications to maintain glu and/or hematoma  - Assess quality of pulses, skin color and temperature  - Assess for signs of decreased coronary artery perfusion - ex.  Angina  - Evaluate fluid balance, assess for edema, trend weights  Outcome: Progressing  Goal: Absence of cardiac arrhy labs and rhythm and assess patient for signs and symptoms of electrolyte imbalances  - Administer electrolyte replacement as ordered  - Monitor response to electrolyte replacements, including rhythm and repeat lab results as appropriate  - Fluid restrictio

## 2021-08-02 ENCOUNTER — APPOINTMENT (OUTPATIENT)
Dept: GENERAL RADIOLOGY | Facility: HOSPITAL | Age: 66
DRG: 335 | End: 2021-08-02
Attending: INTERNAL MEDICINE
Payer: MEDICARE

## 2021-08-02 VITALS
RESPIRATION RATE: 22 BRPM | SYSTOLIC BLOOD PRESSURE: 161 MMHG | OXYGEN SATURATION: 97 % | BODY MASS INDEX: 25 KG/M2 | HEART RATE: 96 BPM | TEMPERATURE: 99 F | DIASTOLIC BLOOD PRESSURE: 76 MMHG | WEIGHT: 206.13 LBS

## 2021-08-02 LAB
GLUCOSE BLD-MCNC: 116 MG/DL (ref 70–99)
GLUCOSE BLD-MCNC: 134 MG/DL (ref 70–99)

## 2021-08-02 PROCEDURE — 90792 PSYCH DIAG EVAL W/MED SRVCS: CPT | Performed by: OTHER

## 2021-08-02 PROCEDURE — 71045 X-RAY EXAM CHEST 1 VIEW: CPT | Performed by: INTERNAL MEDICINE

## 2021-08-02 PROCEDURE — 99239 HOSP IP/OBS DSCHRG MGMT >30: CPT | Performed by: INTERNAL MEDICINE

## 2021-08-02 RX ORDER — ALBUTEROL SULFATE 2.5 MG/3ML
2.5 SOLUTION RESPIRATORY (INHALATION)
Status: DISCONTINUED | OUTPATIENT
Start: 2021-08-02 | End: 2021-08-02

## 2021-08-02 RX ORDER — NIFEDIPINE 30 MG/1
60 TABLET, EXTENDED RELEASE ORAL DAILY
Status: DISCONTINUED | OUTPATIENT
Start: 2021-08-03 | End: 2021-08-02

## 2021-08-02 RX ORDER — LISINOPRIL 40 MG/1
40 TABLET ORAL DAILY
Status: DISCONTINUED | OUTPATIENT
Start: 2021-08-03 | End: 2021-08-02

## 2021-08-02 NOTE — PROGRESS NOTES
[facility]  Progress Note     Margareth Flynn Patient Status:  Inpatient    1955 MRN [id number]   [facility] 3NW-A Attending Alondra Rosas MD   [facility] Day # 15 PCP Rosario Henry MD     Chief Complaint: abd pain    S: Pt witho 0.8 0.4   TP 6.9 6.8 6.2*       Estimated Creatinine Clearance: 155.3 mL/min (A) (based on SCr of 0.59 mg/dL (L)). No results for input(s): PTP, INR in the last 168 hours.          COVID-19 Lab Results    COVID-19  Lab Results   Component Value Date    C following  3. Extubated 7/25 and now on RA  4. Shock  1. resolved  2. levophed gtt, weaned off  5. Recent R toe infection  1. Continue post op care, NWB on R foot   2. Podiatry following  3. S/p R great to amputation 7/28  4.  Finished abx, monitor off abx

## 2021-08-02 NOTE — CONSULTS
[facility]  Report of Psychiatric Consultation    Margareth Merlos Patient Status:  Inpatient    1955 MRN [id number]   [facility] 3NW-A Attending Alondra Rosas MD   [facility] Day # 15 PCP Rosario Henry MD     Date of Admission:  rehab. He was seen by neuro-psych today and dx with dementia. He is non-decisional, so wife is the surrogate decision maker. Wife wants to take him home. She says her nephew lives upstairs and can help her care for him.  I brought up the hospitalist and in 1 TPN, , Intravenous, Continuous TPN  •  acetaminophen (TYLENOL EXTRA STRENGTH) tab 1,000 mg, 1,000 mg, Oral, Q8H  •  oxyCODONE HCl (OXY-IR) cap/tab 2.5-10 mg, 2.5-10 mg, Oral, Q4H PRN  •  haloperidol lactate (HALDOL) 5 MG/ML injection 5 mg, 5 mg, Intra 0.5 MG/2ML nebulizer solution 0.5 mg, 0.5 mg, Nebulization, 2 times daily  •  Albuterol Sulfate  (90 Base) MCG/ACT inhaler 1 puff, 1 puff, Inhalation, Q6H PRN  •  ALPRAZolam (XANAX) tab 2 mg, 2 mg, Oral, BID PRN  •  amphetamine-dextroamphetamine (AD Patient to remain steadfast on returning home rather than following up with a rehabilitation setting. There is concerned about his level of cognitive processing and his ability to understand his medical condition.                 Test Administered     Neur total score of 6 out of 36 which is severely impaired. He made a number of paraphasic errors and confabulated words. Delayed recall was 0. It seems as if the process interfering with memory is working memory.   On the adaptive digit ordering test without fluency he achieved a score of 2 which is severely impaired and category switching he achieved a score of 1.   He truly struggled a switching of cognitive sets.     Given the quality of the patient's somewhat impulsiveness and the descriptions of some fligh completed with Loli naturally speaking. If there is additional questions please feel free to contact me at [phone]. And family is welcome to contact me to review the results.

## 2021-08-02 NOTE — PROGRESS NOTES
Patient become agitated this afternoon and stated that he was going to go home to help take care of his kids since his wife is having oral surgery tomorrow. Pt insisted he is going to leave and attempting to get out of bed.  VINICIUS called and Dr. Hay Moreno at beds

## 2021-08-02 NOTE — PROGRESS NOTES
Pt resting in bed this am. Pt a&ox3 but flighty with his ideas and jumping from one topic to another. Pt pleasant and cooperative. Pt on ra with 02 sats wnl. Lungs cta. Pt denies difficulty breathing or sob. Denies chest pain.  HR SR to ST on tele monitor w

## 2021-08-02 NOTE — CONSULTS
[facility]  Report of Neuropsycholgy Consultation    Helio Lanier Patient Status:  Inpatient    1955 MRN [id number]   [facility] 3NW-A Attending Henry Moore MD   [facility] Day # 15 PCP George Hughes MD         [facility] History:   Diagnosis Date   • Asthma    • Essential hypertension    • Extrinsic asthma, unspecified    • Pneumonia    • Type II or unspecified type diabetes mellitus without mention of complication, not stated as uncontrolled      Past Surgical History: BID  •  PEG 3350 (MIRALAX) powder packet 17 g, 17 g, Oral, Daily PRN  •  magnesium hydroxide (MILK OF MAGNESIA) 400 MG/5ML suspension 30 mL, 30 mL, Oral, Daily PRN  •  bisacodyl (DULCOLAX) rectal suppository 10 mg, 10 mg, Rectal, Daily PRN  •  Fleet Enema mg, Intravenous, Q2H PRN **OR** HYDROmorphone HCl (DILAUDID) 1 MG/ML injection 0.8 mg, 0.8 mg, Intravenous, Q2H PRN  •  ondansetron HCl (ZOFRAN) injection 4 mg, 4 mg, Intravenous, Q6H PRN      Laboratory Data:  Lab Results   Component Value Date    PGLU 13 administration these 4 words are rehearsed 8 times with categorical cues. After a 5-minute delay he achieved a score of 3 out of 12 yet 10 out of 12 is the threshold for normal limits.   The patient has such as social nature who presents with greater funct which is 2 standard deviations below the mean. This indicates advanced decline particularly if comparing to expected performance of normal limits.   Typically individuals with college education perform at least in the average range and at times trend towar unspecified  Recommendations    The patient demonstrates clear cognitive impairment impacting reasoning, judgment and complex processing. There is impairment in delayed recall.   The patient has remarkable quality of reactivity in the moment and a social n

## 2021-08-02 NOTE — PLAN OF CARE
Pt alert and orientedx2-3. Forgetful, disorganized thought process, rambling speech at times. Pt gets fixated on certain subjects and people, paranoid behavior believing people are out to get him or his children.  Wanted his phone shut off this shift due to appropriate and evaluate response  - Consider cultural and social influences on pain and pain management  - Manage/alleviate anxiety  - Utilize distraction and/or relaxation techniques  - Monitor for opioid side effects  - Notify MD/LIP if interventions un Progressing  Goal: Maintains adequate nutritional intake (undernourished)  Description: INTERVENTIONS:  - Monitor percentage of each meal consumed  - Identify factors contributing to decreased intake, treat as appropriate  - Assist with meals as needed  - Notify patient and family of reasons restraints applied  - Assess for any contributing factors to confusion (electrolyte disturbances, delirium, medications)  - Discontinue any unnecessary medical devices as soon as possible  - Assess the patient's physica Incentive Spirometry  - Assess the need for suctioning and perform as needed  - Assess and instruct to report SOB or any respiratory difficulty  - Respiratory Therapy support as indicated  - Manage/alleviate anxiety  - Monitor for signs/symptoms of CO2 ret

## 2021-08-02 NOTE — PROGRESS NOTES
[facility]  Progress Note    Jose Nuñez Patient Status:  Inpatient    1955 MRN [id number]   [facility] 4SW-A Attending Lisset Castellanos MD   [facility] Day # 15 PCP Bean Felipe MD     Subjective: The patient is resting in bed. Acute encephalopathy     Diabetes mellitus (HCC)     Mild intermittent asthma with acute exacerbation     Syncope, near     Confusion     Delirium     Polysubstance dependence ([facility] [address]     Paranoid ideation (HCC)     Chronic right shoulder pain     Aftercare

## 2021-08-02 NOTE — CM/SW NOTE
Updates sent for deisi facilities to review, only 1 accepting facility at this time.     Soledad Bañuelos RN,   [id number]

## 2021-08-02 NOTE — DIETARY NOTE
[facility]  NUTRITION ASSESSMENT    Pt does not meet malnutrition criteria at this time. NUTRITION INTERVENTION:  1. Meal and Snacks - diet advancement per Surgery.   2. Parenteral Nutrition - Goal TPN to provide; 1122 dextrose calories, 600 lipid c 07/25/21 0520 91.9 kg (202 lb 9.6 oz)   07/24/21 0148 89.1 kg (196 lb 6.9 oz)     Wt Readings from Last 10 Encounters:  07/30/21 : 93.5 kg (206 lb 2.1 oz)  07/11/21 : 102.1 kg (225 lb)  09/14/19 : 106.6 kg (235 lb 0.2 oz)  06/01/19 : 106.6 kg (235 lb)  0 goal - Resolved   5.  Lytes to be WNL - Resolved    MEDICATIONS: Colace, Insulin, Protonix    LABS: Glu 135, elevated Alk Phos,  (7/29)    Pt is at Moderate nutrition risk    FOLLOW-UP DATE: 8/6/2021    Mahamed Tellez MS, RDN, LDN  Clinical Dietitian

## 2021-08-02 NOTE — TELEPHONE ENCOUNTER
Please call this patient I need to see him this week as he is again checked himself out [facility]

## 2021-08-03 NOTE — PAYOR COMM NOTE
Discharge Notification    Patient Name: Kelsey Berrios  Payor: [address] [place] [address] #: [id number]  Authorization Number: [id number]  Admit Date/Time: 7/19/2021 7:03 AM  Discharge Date/Time: 8/2/2021 4:24 PM

## 2021-08-03 NOTE — CM/SW NOTE
08/03/21 0837   Discharge disposition   Expected discharge disposition Left against   [address] [facility] Provider Home   Discharge transportation Private car

## 2021-08-05 NOTE — DISCHARGE SUMMARY
[facility] HOSPITAL[facility]  DISCHARGE SUMMARY     Janiya Calixto Patient Status:  Inpatient    1955 MRN [id number]   [facility] 3NW-A Attending No att. providers found   [address] Day # 14 PCP Brock Briones MD     Date of Admission: 2021  [place] Discharge:   · none    Consultants:  • GI, Surgery, Podiatry, Psych, Neuropsych, Renal    Discharge Medication List:     Discharge Medications      CONTINUE taking these medications      Instructions Prescription details   Combivent Respimat  MCG/ACT known as: PINA  Ask about: Which instructions should I use? Inject 40 Units into the skin nightly. Refills: 0     Insulin Lispro 100 UNIT/ML Soln  Commonly known as: HUMALOG      Inject 20 Units into the skin TID PC.    Refills: 0     ketorolac tr 9/3/2021    None          Vital signs:       -----------------------------------------------------------------------------------------------  PATIENT DISCHARGE INSTRUCTIONS: See electronic chart    Humaira Pa MD    Time spent:  > 30 minutes

## 2021-08-09 NOTE — OPERATIVE REPORT
[facility]     OPERATIVE REPORT    Alyse PATINO [id number] MRN [id number]    1955 Age 61year old   Admission Date 2021 Operation Date  2021   Attending Physician No att. providers found Operating Physician STEVE Pelayo equipment was present and functional there was no additions deletions or concerns reported. Intravenous sedation was administered and the right foot was anesthetized with 0.5% Marcaine plain. This was done via posterior tibial medial foot block.   The rig and vascular status of his right foot intact via cart.     Radha Horn DPM

## 2021-08-17 ENCOUNTER — TELEPHONE (OUTPATIENT)
Dept: PODIATRY CLINIC | Facility: CLINIC | Age: 66
End: 2021-08-17

## 2021-08-17 NOTE — TELEPHONE ENCOUNTER
Patient returned call to schedule post operative appointment. Patient states the soonest he can arrange transportation is September 5th or later and prefers late morning appointment therefore, patient is scheduled for post op on Thursday, September 9th.

## 2021-08-31 NOTE — PROGRESS NOTES
NURSING TRANSFER NOTE      Patient admitted via Wheelchair  Oriented to room. Safety precautions initiated. Bed in low position. Call light in reach. Pt received from ICU, with RN and transport.  VSS upon arrival.   Pt is AOX4, c/o right shoulder Stable

## 2021-10-08 NOTE — PROGRESS NOTES
Dr. Aliza Nunez paged to update on transfer to ICU. Will give report to Fidencio Kimball RN on SCU b/c awaiting bed. Patient Education     D-Dimer  Does this test have other names?  Fragment D-dimer, fibrin degradation fragment  What is this test?  This is a blood test to look for a substance called D-dimer. This test is used to rule out a blood clot.  D-dimer is a protein fragment from the break down of a blood clot. Blood clots generally start to slowly break down after they are formed, and this process releases D-dimer into the blood.   Why do I need this test?  You may need this test if your healthcare provider suspects you have a dangerous blood clot. A blood clot that forms in a deep vein is called a deep vein thrombosis (DVT). A DVT is most commonly found in the legs. If the clot travels to the lungs, it's called a pulmonary embolism (PE). In the lungs, the clot can cut off the flow of blood. This is a medical emergency and may cause death.  Symptoms of a blood clot include:  · Leg swelling (edema)  · Leg pain or tenderness  · Leg redness  If you have these symptoms, call your healthcare provider.  You may also have a D-dimer test if you have symptoms of a blood clot in the lungs, such as:  · Trouble breathing  · Coughing (may cough up blood)  · Fast heartbeat  · Chest pain   · Sweating  · Fainting  If you have these symptoms, call 911 or get emergency help.  People with blood clots often have one or more risk factors. These risk factors include:  · Major surgery (for example, hip surgery) or injury (for example, a broken leg)  · Not being able to move for long periods of time (for example, from being in the hospital, or taking long trips by plane or car)  · Pregnancy or recent childbirth  · Certain cancers  · Antiphospholipid syndrome  · Inherited clotting disorder, such as factor V Leiden mutation  You may also need this test to help diagnose and monitor treatment for other conditions.  What other tests might I have along with this test?  You may need more blood tests, including platelet count, fibrinogen, and prothrombin  time.   If your D-dimer test is positive, you will have other tests. You may have the following imaging tests to look for blood clots:  · Ultrasonography  · CT angiography  · Pulmonary angiography  · Ventilation/perfusion scanning   These tests help your healthcare provider diagnose blood clots in your legs, arms, lungs, or other parts of the body.  What do my test results mean?  Many things may affect your lab test results. These include the method each lab uses to do the test. Even if your test results are different from the normal value, you may not have a problem. To learn what the results mean for you, talk with your healthcare provider.  An elevated D-dimer level is not normal. It's usually found after a clot has formed and is in the process of breaking down. If you are having significant formation and breakdown of blood clot in your body, your D-dimer may be elevated.  A negative D-dimer test means that a blood clot is highly unlikely.  A positive D-dimer test doesn't mean that you have a clot. There may be other reasons it is positive. More testing is usually needed.  How is this test done?  The test requires a blood sample, which is drawn through a needle from a vein in your arm.  Does this test pose any risks?  Taking a blood sample with a needle carries risks that include bleeding, infection, bruising, or feeling dizzy. When the needle pricks your arm, you may feel a slight stinging sensation or pain. Afterward, the site may be slightly sore.   What might affect my test results?  Levels may be higher in people with other problems. They include infection, heart attack (myocardial infarction), liver disease, cancer, and injury.  How do I get ready for this test?  You don't need to prepare for this test.   [employer] last reviewed this educational content on 4/1/2019  © 0433-3204 [employer]. All rights reserved. This information is not intended as a substitute for professional medical care.  Always follow your healthcare professional's instructions.       Patient Education     Discharge Instructions for Pulmonary Embolism   A deep vein thrombosis (DVT) is a blood clot in a large vein deep in a leg, arm, or elsewhere in the body. The clot can separate from the vein, travel to the lungs and cut off blood flow. This is a pulmonary embolism (PE). Pulmonary embolism is very serious and may cause death if the clot is large or there are multiple clots.       Home care  Taking care of yourself is very important. To help prevent more blood clots from forming, follow your healthcare provider's instructions. Do the following:   · Take your medicines exactly as instructed. Don’t skip doses. If you miss a dose, call your healthcare provider and ask what you should do.    · Have all lab tests as recommended. This is very important when you take medicines to prevent blood clots.   · If your healthcare provider has instructed you to do so, wear elastic (compression stockings).  · Get up and get moving.  · While sitting for long periods of time, move your knees, ankles, feet, and toes.  Lifestyle changes  To help prevent problems with your heart and blood vessels, do the following:    · If you smoke, get help to quit. Talk with your healthcare provider about medicines and programs that can help.  · Stay at a healthy weight. Talk to your healthcare provider about losing weight, if you are overweight  · Try to exercise at least 30 minutes on most days. Before starting an exercise program, talk with your healthcare provider.   · When traveling by car, make frequent stops to get up and move around.  · On long airplane rides, get up and move around when possible. If you can’t get up, wiggle your toes, move your ankles and tighten your calves to keep your blood moving.  Follow-up care  Make a follow-up appointment as directed. Have your lab work done as directed.   When to call your healthcare provider  Call your healthcare  provider right away if you have:   · Pain, swelling, and redness in your leg, arm, or other body area. These symptoms may mean another blood clot.  · Blood in your urine  · Bleeding with bowel movements  · Bleeding from the nose, gums, a cut, or vagina  Call 911  Call  911 if you have symptoms of a blood clot in the lungs:    · Chest pain  · Trouble breathing  · Coughing (may cough up blood)  · Fast heartbeat  · Sweating  · Fainting  Also call 911 if you have:   · Heavy or uncontrolled bleeding. If you are taking a blood thinner, you have an increased chance of bleeding.  [employer] last reviewed this educational content on 6/1/2019  © 1507-3294 [employer]. [address] [place] [address], [place] [address]. All rights reserved. This information is not intended as a substitute for professional medical care. Always follow your healthcare professional's instructions.

## 2022-01-24 ENCOUNTER — EXTERNAL RECORD (OUTPATIENT)
Dept: HEALTH INFORMATION MANAGEMENT | Facility: OTHER | Age: 67
End: 2022-01-24

## 2022-10-18 ENCOUNTER — APPOINTMENT (OUTPATIENT)
Dept: INTERNAL MEDICINE | Age: 67
End: 2022-10-18

## 2022-10-18 ENCOUNTER — OFFICE VISIT (OUTPATIENT)
Dept: INTERNAL MEDICINE | Age: 67
End: 2022-10-18

## 2022-10-18 VITALS
RESPIRATION RATE: 16 BRPM | HEART RATE: 101 BPM | BODY MASS INDEX: 25.82 KG/M2 | TEMPERATURE: 98.9 F | SYSTOLIC BLOOD PRESSURE: 142 MMHG | WEIGHT: 212 LBS | OXYGEN SATURATION: 98 % | DIASTOLIC BLOOD PRESSURE: 82 MMHG | HEIGHT: 76 IN

## 2022-10-18 DIAGNOSIS — J45.40 MODERATE PERSISTENT ASTHMA WITHOUT COMPLICATION: ICD-10-CM

## 2022-10-18 DIAGNOSIS — G89.29 CHRONIC LEFT SHOULDER PAIN: ICD-10-CM

## 2022-10-18 DIAGNOSIS — Z79.4 TYPE 2 DIABETES MELLITUS WITH HYPERGLYCEMIA, WITH LONG-TERM CURRENT USE OF INSULIN (CMD): Primary | ICD-10-CM

## 2022-10-18 DIAGNOSIS — E11.65 TYPE 2 DIABETES MELLITUS WITH HYPERGLYCEMIA, WITH LONG-TERM CURRENT USE OF INSULIN (CMD): Primary | ICD-10-CM

## 2022-10-18 DIAGNOSIS — K43.9 VENTRAL HERNIA WITHOUT OBSTRUCTION OR GANGRENE: ICD-10-CM

## 2022-10-18 DIAGNOSIS — M25.512 CHRONIC LEFT SHOULDER PAIN: ICD-10-CM

## 2022-10-18 DIAGNOSIS — E78.00 PURE HYPERCHOLESTEROLEMIA: ICD-10-CM

## 2022-10-18 DIAGNOSIS — F33.1 MAJOR DEPRESSIVE DISORDER, RECURRENT EPISODE, MODERATE (CMD): ICD-10-CM

## 2022-10-18 DIAGNOSIS — G89.4 CHRONIC PAIN SYNDROME: ICD-10-CM

## 2022-10-18 DIAGNOSIS — Z89.429 STATUS POST AMPUTATION OF TOE (CMD): ICD-10-CM

## 2022-10-18 PROBLEM — E11.8 DIABETES MELLITUS TYPE 2 WITH COMPLICATIONS (CMD): Status: ACTIVE | Noted: 2022-10-18

## 2022-10-18 PROBLEM — Z91.199 NONCOMPLIANCE WITH CPAP TREATMENT: Status: ACTIVE | Noted: 2022-10-18

## 2022-10-18 PROBLEM — J45.909 ASTHMA: Status: ACTIVE | Noted: 2022-10-18

## 2022-10-18 PROBLEM — E11.9 TYPE 2 DIABETES MELLITUS (CMD): Status: ACTIVE | Noted: 2022-10-18

## 2022-10-18 PROBLEM — Z91.199 NONCOMPLIANCE WITH CPAP TREATMENT: Status: RESOLVED | Noted: 2022-10-18 | Resolved: 2022-10-18

## 2022-10-18 PROBLEM — G47.33 OSA ON CPAP: Status: ACTIVE | Noted: 2022-10-18

## 2022-10-18 PROCEDURE — 99204 OFFICE O/P NEW MOD 45 MIN: CPT | Performed by: INTERNAL MEDICINE

## 2022-10-18 PROCEDURE — 3079F DIAST BP 80-89 MM HG: CPT | Performed by: INTERNAL MEDICINE

## 2022-10-18 PROCEDURE — 3077F SYST BP >= 140 MM HG: CPT | Performed by: INTERNAL MEDICINE

## 2022-10-18 RX ORDER — INSULIN LISPRO 100 [IU]/ML
10 INJECTION, SOLUTION INTRAVENOUS; SUBCUTANEOUS
Qty: 10 ML | Refills: 0 | Status: SHIPPED | OUTPATIENT
Start: 2022-10-18

## 2022-10-18 RX ORDER — ALBUTEROL SULFATE 90 UG/1
2 AEROSOL, METERED RESPIRATORY (INHALATION) EVERY 4 HOURS PRN
Qty: 1 EACH | Refills: 0 | Status: SHIPPED | OUTPATIENT
Start: 2022-10-18 | End: 2022-12-27

## 2022-10-18 ASSESSMENT — PAIN SCALES - GENERAL: PAINLEVEL: 0

## 2022-11-05 ENCOUNTER — EXTERNAL RECORD (OUTPATIENT)
Dept: OTHER | Age: 67
End: 2022-11-05

## 2022-11-05 PROCEDURE — 99223 1ST HOSP IP/OBS HIGH 75: CPT | Performed by: HOSPITALIST

## 2022-11-05 PROCEDURE — 93010 ELECTROCARDIOGRAM REPORT: CPT | Performed by: INTERNAL MEDICINE

## 2022-11-06 PROCEDURE — 93010 ELECTROCARDIOGRAM REPORT: CPT | Performed by: INTERNAL MEDICINE

## 2022-11-06 PROCEDURE — 99233 SBSQ HOSP IP/OBS HIGH 50: CPT | Performed by: HOSPITALIST

## 2022-11-07 PROCEDURE — 93306 TTE W/DOPPLER COMPLETE: CPT | Performed by: INTERNAL MEDICINE

## 2022-11-08 ENCOUNTER — EXTERNAL RECORD (OUTPATIENT)
Dept: PULMONOLOGY | Age: 67
End: 2022-11-08

## 2022-11-08 PROCEDURE — 99223 1ST HOSP IP/OBS HIGH 75: CPT | Performed by: INTERNAL MEDICINE

## 2022-11-09 ENCOUNTER — EXTERNAL RECORD (OUTPATIENT)
Dept: OTHER | Age: 67
End: 2022-11-09

## 2022-11-09 PROCEDURE — 99233 SBSQ HOSP IP/OBS HIGH 50: CPT | Performed by: INTERNAL MEDICINE

## 2022-11-09 PROCEDURE — 93010 ELECTROCARDIOGRAM REPORT: CPT | Performed by: INTERNAL MEDICINE

## 2022-11-10 PROCEDURE — 99223 1ST HOSP IP/OBS HIGH 75: CPT | Performed by: HOSPITALIST

## 2022-11-11 PROCEDURE — 99233 SBSQ HOSP IP/OBS HIGH 50: CPT | Performed by: HOSPITALIST

## 2022-11-11 PROCEDURE — 99233 SBSQ HOSP IP/OBS HIGH 50: CPT | Performed by: INTERNAL MEDICINE

## 2022-11-12 PROCEDURE — 99233 SBSQ HOSP IP/OBS HIGH 50: CPT | Performed by: INTERNAL MEDICINE

## 2022-11-12 PROCEDURE — 99232 SBSQ HOSP IP/OBS MODERATE 35: CPT | Performed by: HOSPITALIST

## 2022-11-14 ENCOUNTER — TELEPHONE (OUTPATIENT)
Dept: OTHER | Facility: OTHER | Age: 67
End: 2022-11-14

## 2022-11-24 ENCOUNTER — EXTERNAL LAB (OUTPATIENT)
Dept: OTHER | Age: 67
End: 2022-11-24

## 2022-11-24 LAB
EST. AVERAGE GLUCOSE BLD GHB EST-MCNC: 123 MG/DL
HBA1C MFR BLD: 5.9 %
NT-PROBNP SERPL-MCNC: 44 PG/ML (ref 0–100)
TROPONIN I SERPL DL<=0.01 NG/ML-MCNC: 9 PG/ML (ref 0–20)

## 2022-11-24 PROCEDURE — 93010 ELECTROCARDIOGRAM REPORT: CPT | Performed by: INTERNAL MEDICINE

## 2022-11-25 ENCOUNTER — EXTERNAL LAB (OUTPATIENT)
Dept: OTHER | Age: 67
End: 2022-11-25

## 2022-11-25 ENCOUNTER — EXTERNAL RECORD (OUTPATIENT)
Dept: OTHER | Age: 67
End: 2022-11-25

## 2022-11-25 LAB
LAB RESULT: NORMAL
LAB RESULT: NORMAL
SARS-COV-2 RNA RESP QL NAA+PROBE: NOT DETECTED

## 2022-11-25 PROCEDURE — 99223 1ST HOSP IP/OBS HIGH 75: CPT | Performed by: HOSPITALIST

## 2022-11-25 PROCEDURE — 99221 1ST HOSP IP/OBS SF/LOW 40: CPT | Performed by: SURGERY

## 2022-11-26 ENCOUNTER — EXTERNAL LAB (OUTPATIENT)
Dept: OTHER | Age: 67
End: 2022-11-26

## 2022-11-26 LAB — LAB RESULT: NORMAL

## 2022-11-26 PROCEDURE — 99233 SBSQ HOSP IP/OBS HIGH 50: CPT | Performed by: HOSPITALIST

## 2022-11-26 PROCEDURE — 99232 SBSQ HOSP IP/OBS MODERATE 35: CPT | Performed by: SURGERY

## 2022-11-27 ENCOUNTER — EXTERNAL LAB (OUTPATIENT)
Dept: OTHER | Age: 67
End: 2022-11-27

## 2022-11-27 LAB
ANION GAP SERPL CALC-SCNC: 9 MEQ/L (ref 6.2–14.7)
BASOPHILS # BLD: 0 10*3/UL (ref 0–0.14)
BASOPHILS NFR BLD: 0.3 %
BUN SERPL-MCNC: 23 MG/DL (ref 7–25)
BUN/CREAT SERPL: 25.8 (ref 7.4–23)
CALCIUM SERPL-MCNC: 9.2 MG/DL (ref 8.6–10.3)
CHLORIDE SERPL-SCNC: 98 MMOL/L (ref 98–107)
CO2 SERPL-SCNC: 29 MEQ/L (ref 21–31)
CREAT SERPL-MCNC: 0.89 MG/DL (ref 0.7–1.3)
EOSINOPHIL # BLD: 0 10*3/UL (ref 0–0.6)
EOSINOPHIL NFR BLD: 0 %
ERYTHROCYTE [DISTWIDTH] IN BLOOD: 12.8 % (ref 11.9–15.9)
GFR SERPLBLD SCHWARTZ-ARVRAT: >60 ML/MIN
GLUCOSE BLDC GLUCOMTR-MCNC: 166 MG/DL (ref 70–110)
GLUCOSE SERPL-MCNC: 162 MG/DL (ref 70–99)
HCT VFR BLD CALC: 36.7 % (ref 38.6–49.2)
HGB BLD-MCNC: 12 GM/DL (ref 13–17)
LENGTH OF FAST TIME PATIENT: ABNORMAL H
LENGTH OF FAST TIME PATIENT: ABNORMAL H
LYMPHOCYTES # BLD: 0.4 10*3/UL (ref 0.78–3.73)
LYMPHOCYTES NFR BLD: 4.6 %
MCH RBC QN AUTO: 29 PG (ref 26–34)
MCHC RBC AUTO-ENTMCNC: 32.7 G/DL (ref 32.5–35.8)
MCV RBC AUTO: 88.7 FL (ref 80–100)
MONOCYTES # BLD: 0.3 10*3/UL (ref 0.17–1)
MONOCYTES NFR BLD: 3.7 %
MPV (OFFPRE2): 8.6 FL (ref 6.8–10.2)
NEUTROPHILS # BLD: 8.6 10*3/UL (ref 1.91–7.6)
NEUTROPHILS NFR BLD: 91.4 %
PLATELET # BLD: 191 K/MM3 (ref 150–450)
POTASSIUM SERPL-SCNC: 3.7 MMOL/L (ref 3.5–5.2)
RBC # BLD: 4.14 M/MM3 (ref 4.34–5.6)
SODIUM SERPL-SCNC: 136 MMOL/L (ref 133–144)
WBC # BLD: 9.4 K/MM3 (ref 4–11)

## 2022-11-27 PROCEDURE — 99232 SBSQ HOSP IP/OBS MODERATE 35: CPT | Performed by: SURGERY

## 2022-11-27 PROCEDURE — 99233 SBSQ HOSP IP/OBS HIGH 50: CPT | Performed by: HOSPITALIST

## 2022-12-07 ENCOUNTER — EXTERNAL RECORD (OUTPATIENT)
Dept: OTHER | Age: 67
End: 2022-12-07

## 2022-12-07 PROCEDURE — 99223 1ST HOSP IP/OBS HIGH 75: CPT | Performed by: HOSPITALIST

## 2022-12-07 PROCEDURE — 93010 ELECTROCARDIOGRAM REPORT: CPT | Performed by: INTERNAL MEDICINE

## 2022-12-07 NOTE — PROGRESS NOTES
Sepsis best practice alert came up. . Afebrile. Dr. Jignesh Sinha informed of BPA, that -110's for this RN, that WBC 28.6 morning of 7/20 and that pt. Is not receiving any abx at this time, no orders received at this. MD to review patient's chart. Alar Island Pedicle Flap Text: The defect edges were debeveled with a #15 scalpel blade.  Given the location of the defect, shape of the defect and the proximity to the alar rim an island pedicle advancement flap was deemed most appropriate.  Using a sterile surgical marker, an appropriate advancement flap was drawn incorporating the defect, outlining the appropriate donor tissue and placing the expected incisions within the nasal ala running parallel to the alar rim. The area thus outlined was incised with a #15 scalpel blade.  The skin margins were undermined minimally to an appropriate distance in all directions around the primary defect and laterally outward around the island pedicle utilizing iris scissors.  There was minimal undermining beneath the pedicle flap.

## 2022-12-08 ENCOUNTER — EXTERNAL RECORD (OUTPATIENT)
Dept: OTHER | Age: 67
End: 2022-12-08

## 2022-12-08 PROCEDURE — 99223 1ST HOSP IP/OBS HIGH 75: CPT | Performed by: HOSPITALIST

## 2022-12-08 PROCEDURE — 99223 1ST HOSP IP/OBS HIGH 75: CPT | Performed by: INTERNAL MEDICINE

## 2022-12-09 ENCOUNTER — EXTERNAL RECORD (OUTPATIENT)
Dept: PODIATRY | Age: 67
End: 2022-12-09

## 2022-12-09 PROCEDURE — 93308 TTE F-UP OR LMTD: CPT | Performed by: INTERNAL MEDICINE

## 2022-12-09 PROCEDURE — 99222 1ST HOSP IP/OBS MODERATE 55: CPT | Performed by: PODIATRIST

## 2022-12-09 PROCEDURE — 99226 SUBSEQUENT OBSERVATION CARE III: CPT | Performed by: HOSPITALIST

## 2022-12-10 PROCEDURE — 99226 SUBSEQUENT OBSERVATION CARE III: CPT | Performed by: HOSPITALIST

## 2022-12-11 PROCEDURE — 99226 SUBSEQUENT OBSERVATION CARE III: CPT | Performed by: HOSPITALIST

## 2022-12-12 PROCEDURE — 99232 SBSQ HOSP IP/OBS MODERATE 35: CPT | Performed by: HOSPITALIST

## 2022-12-13 PROCEDURE — 99232 SBSQ HOSP IP/OBS MODERATE 35: CPT | Performed by: HOSPITALIST

## 2022-12-14 PROCEDURE — 99232 SBSQ HOSP IP/OBS MODERATE 35: CPT | Performed by: HOSPITALIST

## 2022-12-15 ENCOUNTER — EXTERNAL RECORD (OUTPATIENT)
Dept: OTHER | Age: 67
End: 2022-12-15

## 2022-12-15 PROCEDURE — 99239 HOSP IP/OBS DSCHRG MGMT >30: CPT | Performed by: HOSPITALIST

## 2022-12-16 ENCOUNTER — TELEPHONE (OUTPATIENT)
Dept: INTERNAL MEDICINE | Age: 67
End: 2022-12-16

## 2022-12-27 DIAGNOSIS — J45.40 MODERATE PERSISTENT ASTHMA WITHOUT COMPLICATION: ICD-10-CM

## 2022-12-27 RX ORDER — ALBUTEROL SULFATE 90 UG/1
2 AEROSOL, METERED RESPIRATORY (INHALATION) EVERY 4 HOURS PRN
Qty: 18 G | Refills: 10 | Status: SHIPPED | OUTPATIENT
Start: 2022-12-27

## 2023-01-19 ENCOUNTER — TELEPHONE (OUTPATIENT)
Dept: INTERNAL MEDICINE | Age: 68
End: 2023-01-19

## 2023-01-26 NOTE — RESPIRATORY THERAPY NOTE
ABG done this am Vapotherm initiated once transferred to ICU 40L 100%. Now on 40L 90% weaning as able. Q4 W/A nebs in line. Will continue to monitor closely. 4 = No assist / stand by assistance

## 2023-08-08 PROCEDURE — 93010 ELECTROCARDIOGRAM REPORT: CPT | Performed by: INTERNAL MEDICINE

## 2023-10-25 ENCOUNTER — EXTERNAL RECORD (OUTPATIENT)
Dept: HEALTH INFORMATION MANAGEMENT | Facility: OTHER | Age: 68
End: 2023-10-25

## 2024-01-17 NOTE — PLAN OF CARE
INR has declined to 1.8 - subtherapeutic.  Patient confirms current warfarin dose and followed.  No dietary changes or s/s reported.  Instructions given:  Will boost today's dose to 12.5 mg, then increase dose to 10 mg every Mon, Wed, Fri; and 5 mg on all other days.  Recheck INR in 2 weeks.  Dose calendar given and reviewed with patient - confirms understanding.     Problem: RESPIRATORY - ADULT  Goal: Achieves optimal ventilation and oxygenation  INTERVENTIONS:  - Assess for changes in respiratory status  - Assess for changes in mentation and behavior  - Position to facilitate oxygenation and minimize respiratory effo

## 2024-06-26 ENCOUNTER — TELEPHONE (OUTPATIENT)
Dept: INTERNAL MEDICINE | Age: 69
End: 2024-06-26

## 2025-04-28 NOTE — PROGRESS NOTES
[facility]  Nephrology Progress Note    Argelia Angulo Attending:  Afsaneh Croft MD       Subjective:  Chart reviewed  Patient intermittently confused; wants to go home \"just for a little while\"  Denies any pain  Good UOP    potassium phosphate diba BID  Umeclidinium Bromide (INCRUSE ELLIPTA) 62.5 MCG/INH inhaler 1 puff, 1 puff, Inhalation, Daily  lidocaine-menthol 4-1 % 1 patch, 1 patch, Transdermal, Daily  [Held by provider] NIFEdipine ER osmotic release (PROCARDIA XL) 24 hr tab 30 mg, 30 mg, Oral, 07/27/21  0450   WBC 17.3*  --   --   --  12.3*  --  11.7*   HGB 5.4*   < > 6.1* 7.2* 6.8* 7.4* 7.7*   .8*  --   --   --  96.9  --  92.0   .0  --   --   --  161.0  --  162.0    < > = values in this interval not displayed.        Recent Labs No

## (undated) DEVICE — PREMIUM WET SKIN PREP TRAY: Brand: MEDLINE INDUSTRIES, INC.

## (undated) DEVICE — SPECIMEN CONTAINER,POSITIVE SEAL INDICATOR, OR PACKAGED: Brand: PRECISION

## (undated) DEVICE — STERILE POLYISOPRENE POWDER-FREE SURGICAL GLOVES: Brand: PROTEXIS

## (undated) DEVICE — 3M™ TEGADERM™ TRANSPARENT FILM DRESSING, 1626W, 4 IN X 4-3/4 IN (10 CM X 12 CM), 50 EACH/CARTON, 4 CARTON/CASE: Brand: 3M™ TEGADERM™

## (undated) DEVICE — STRETCH BANDAGE ROLL: Brand: DERMACEA

## (undated) DEVICE — 1200CC GUARDIAN II: Brand: GUARDIAN

## (undated) DEVICE — SUTURE VICRYL 3-0 SH

## (undated) DEVICE — 3M(TM) MICROPORE TAPE DISPENSER 1535-2: Brand: 3M™ MICROPORE™

## (undated) DEVICE — GAUZE SPONGES,USP TYPE VII GAUZE, 12 PLY: Brand: CURITY

## (undated) DEVICE — SUTURE PDS II 1 TP-1

## (undated) DEVICE — VIOLET BRAIDED (POLYGLACTIN 910), SYNTHETIC ABSORBABLE SUTURE: Brand: COATED VICRYL

## (undated) DEVICE — OCCLUSIVE GAUZE STRIP OVERWRAP,3% BISMUTH TRIBROMOPHENATE IN PETROLATUM BLEND: Brand: XEROFORM

## (undated) DEVICE — CHLORAPREP 26ML APPLICATOR

## (undated) DEVICE — PROXIMATE SKIN STAPLERS (35 WIDE) CONTAINS 35 STAINLESS STEEL STAPLES (FIXED HEAD): Brand: PROXIMATE

## (undated) DEVICE — LAPAROTOMY CDS: Brand: MEDLINE INDUSTRIES, INC.

## (undated) DEVICE — SUTURE VICRYL 2-0

## (undated) DEVICE — PROXIMATE RELOADABLE LINEAR STAPLER: Brand: PROXIMATE

## (undated) DEVICE — CLIP LGT 11MM OPEN 2.8MM 235CM

## (undated) DEVICE — MASK ETCO2 PANORAMIC

## (undated) DEVICE — DRAIN CHANNEL 19FR BLAKE

## (undated) DEVICE — STERILE SYNTHETIC POLYISOPRENE POWDER-FREE SURGICAL GLOVES WITH HYDROGEL COATING, SMOOTH FINISH, STRAIGHT FINGER: Brand: PROTEXIS

## (undated) DEVICE — Device

## (undated) DEVICE — ABDOMINAL PAD: Brand: DERMACEA

## (undated) DEVICE — DRESSING AQUACEL AG 3.5X12

## (undated) DEVICE — BLADE SAW KM33-11

## (undated) DEVICE — DRESSING AQUACEL AG 3.5 X 10

## (undated) DEVICE — CLOSING BUNDLE: Brand: MEDLINE INDUSTRIES, INC.

## (undated) DEVICE — NEEDLE CONTRAST INTERJECT 25G

## (undated) DEVICE — STERILE SYNTHETIC POLYISOPRENE POWDER-FREE SURGICAL GLOVES WITH HYDROGEL COATING: Brand: PROTEXIS

## (undated) DEVICE — SCD SLEEVE KNEE HI BLEND

## (undated) DEVICE — C-ARM: Brand: UNBRANDED

## (undated) DEVICE — SOL  .9 1000ML BTL

## (undated) DEVICE — REM POLYHESIVE ADULT PATIENT RETURN ELECTRODE: Brand: VALLEYLAB

## (undated) DEVICE — SUTURE PROLENE 2-0 SH

## (undated) DEVICE — DISPOSABLE TOURNIQUET CUFF SINGLE BLADDER, DUAL PORT AND QUICK CONNECT CONNECTOR: Brand: COLOR CUFF

## (undated) DEVICE — SUTURE PDS II 1 CTX

## (undated) DEVICE — POOLE SUCTION HANDLE: Brand: CARDINAL HEALTH

## (undated) DEVICE — 450 ML BOTTLE OF 0.05% CHLORHEXIDINE GLUCONATE IN 99.95% STERILE WATER FOR IRRIGATION, USP AND APPLICATOR.: Brand: IRRISEPT ANTIMICROBIAL WOUND LAVAGE

## (undated) DEVICE — DRAPE EQUIPMENT INTRATEMP THER

## (undated) DEVICE — LIGHT HANDLE

## (undated) DEVICE — CATH GOLD PROBE HEMOGLIDE 7FR

## (undated) DEVICE — 3M™ RED DOT™ MONITORING ELECTRODE WITH FOAM TAPE AND STICKY GEL, 50/BAG, 20/CASE, 72/PLT 2570: Brand: RED DOT™

## (undated) DEVICE — SUTURE VICRYL 0

## (undated) DEVICE — DRAIN RESERVOIR RELIAVAC 100CC

## (undated) DEVICE — STANDARD HYPODERMIC NEEDLE,POLYPROPYLENE HUB: Brand: MONOJECT

## (undated) DEVICE — LIGASURE IMPACT OPEN DEVICE

## (undated) DEVICE — GOWN,SIRUS,FABRIC-REINFORCED,X-LARGE: Brand: MEDLINE

## (undated) DEVICE — PROXIMATE LINEAR CUTTER RELOAD, BLUE, 75MM: Brand: PROXIMATE

## (undated) DEVICE — ENDOSCOPY PACK UPPER: Brand: MEDLINE INDUSTRIES, INC.

## (undated) DEVICE — LAPAROTOMY SPONGE - RF AND X-RAY DETECTABLE PRE-WASHED: Brand: SITUATE

## (undated) DEVICE — FORCEP BIOPSY RJ4 LG CAP W/ND

## (undated) DEVICE — GOWN,PREVENTION PLUS,XLARGE,STERILE: Brand: MEDLINE

## (undated) DEVICE — Device: Brand: PLUMEPEN

## (undated) DEVICE — PROXIMATE RELOADABLE LINEAR CUTTER WITH SAFETY LOCK-OUT, 75MM: Brand: PROXIMATE

## (undated) DEVICE — LOWER EXTREMITY CDS-LF: Brand: MEDLINE INDUSTRIES, INC.

## (undated) DEVICE — Device: Brand: DEFENDO AIR/WATER/SUCTION AND BIOPSY VALVE

## (undated) DEVICE — BLADE ELECTRODE: Brand: EDGE

## (undated) DEVICE — STOCKINETTE HYDROMED 6\" 706044

## (undated) DEVICE — SUTURE ETHILON 3-0 FSL

## (undated) NOTE — LETTER
Tracy Alberts [address]  [address] [facility] [address] [facility]  Authorization for Surgical Operation and Procedure     Date:___________                                                                                                         Time:__________ occur: fever and allergic reactions, hemolytic reactions, transmission of diseases such as Hepatitis, AIDS and Cytomegalovirus (CMV) and fluid overload.   In the event that I wish to have an autologous transfusion of my own blood, or a directed donor transf applicable recovery period ends for purposes of reinstating the DNAR order.   10. Patients having a sterilization procedure: I understand that if the procedure is successful the results will be permanent and it will therefore be impossible for me to insemin medicine and do additional procedures as necessary. Some examples are: Starting or using an “IV” to give me medicine, fluids or blood during my procedure, and having a breathing tube placed to help me breathe when I’m asleep (intubation).  In the event that but potential complications include headache, bleeding, infection, seizure, irregular heart rhythms, and nerve injury.     I can change my mind about having anesthesia services at any time before I get the medicine.    ______________________________________

## (undated) NOTE — LETTER
Date & Time: 9/14/2019, 10:29 PM  Patient: Lisseth Stuart  Encounter Provider(s):    Vanessa Zacarias MD         This certifies that I, Lisseth Stuart, a patient at an [facility] facility, am leaving the facility voluntarily and against the

## (undated) NOTE — ED AVS SNAPSHOT
Maura Agent   MRN: [id number]    Department:  [facility] Emergency Department   Date of Visit:  5/19/2018           Disclosure     Insurance plans vary and the physician(s) referred by the ER may not be covered by your plan.  Please contact your tell this physician (or your personal doctor if your instructions are to return to your personal doctor) about any new or lasting problems. The primary care or specialist physician will see patients referred from the [facility] Emergency Department.  Arnaldo Piedra

## (undated) NOTE — ED AVS SNAPSHOT
Venora Sicard   MRN: [id number]    Department:  [facility] Emergency Department   Date of Visit:  8/29/2018           Disclosure     Insurance plans vary and the physician(s) referred by the ER may not be covered by your plan.  Please contact your tell this physician (or your personal doctor if your instructions are to return to your personal doctor) about any new or lasting problems. The primary care or specialist physician will see patients referred from the [facility] Emergency Department.  Abram French

## (undated) NOTE — LETTER
Tracy Alberts 89  [address] [facility] [address], [address] [facility]  Authorization for Surgical Operation and Procedure     Date:___________                                                                                                         Time:__________ occur: fever and allergic reactions, hemolytic reactions, transmission of diseases such as Hepatitis, AIDS and Cytomegalovirus (CMV) and fluid overload.   In the event that I wish to have an autologous transfusion of my own blood, or a directed donor transf applicable recovery period ends for purposes of reinstating the DNAR order.   10. Patients having a sterilization procedure: I understand that if the procedure is successful the results will be permanent and it will therefore be impossible for me to insemin medicine and do additional procedures as necessary. Some examples are: Starting or using an “IV” to give me medicine, fluids or blood during my procedure, and having a breathing tube placed to help me breathe when I’m asleep (intubation).  In the event that but potential complications include headache, bleeding, infection, seizure, irregular heart rhythms, and nerve injury.     I can change my mind about having anesthesia services at any time before I get the medicine.    ______________________________________

## (undated) NOTE — LETTER
Tracy Alberts 89  [address] [facility] [address], [address] [facility]  Authorization for Surgical Operation and Procedure     Date:___________                                                                                                         Time:__________ potential risks that can occur: fever and allergic reactions, hemolytic reactions, transmission of diseases such as Hepatitis, AIDS and Cytomegalovirus (CMV) and fluid overload.   In the event that I wish to have an autologous transfusion of my own blood, o will determine when the applicable recovery period ends for purposes of reinstating the DNAR order.   10. Patients having a sterilization procedure: I understand that if the procedure is successful the results will be permanent and it will therefore be impo doctor) to give me medicine and do additional procedures as necessary.  Some examples are: Starting or using an “IV” to give me medicine, fluids or blood during my procedure, and having a breathing tube placed to help me breathe when I’m asleep (intubation) understand that rare but potential complications include headache, bleeding, infection, seizure, irregular heart rhythms, and nerve injury.     I can change my mind about having anesthesia services at any time before I get the medicine.    _________________

## (undated) NOTE — LETTER
Tracy Alberts 182  [address] [facility] [address], [address] [facility]  Authorization for Surgical Operation and Procedure     Date:___________                                                                                                         Time:__________ occur: fever and allergic reactions, hemolytic reactions, transmission of diseases such as Hepatitis, AIDS and Cytomegalovirus (CMV) and fluid overload.   In the event that I wish to have an autologous transfusion of my own blood, or a directed donor transf applicable recovery period ends for purposes of reinstating the DNAR order.   10. Patients having a sterilization procedure: I understand that if the procedure is successful the results will be permanent and it will therefore be impossible for me to insemin medicine and do additional procedures as necessary. Some examples are: Starting or using an “IV” to give me medicine, fluids or blood during my procedure, and having a breathing tube placed to help me breathe when I’m asleep (intubation).  In the event that but potential complications include headache, bleeding, infection, seizure, irregular heart rhythms, and nerve injury.     I can change my mind about having anesthesia services at any time before I get the medicine.    ______________________________________

## (undated) NOTE — ED AVS SNAPSHOT
Samantha Ant   MRN: [id number]    Department:  [facility] Emergency Department   Date of Visit:  7/15/2018           Disclosure     Insurance plans vary and the physician(s) referred by the ER may not be covered by your plan.  Please contact your tell this physician (or your personal doctor if your instructions are to return to your personal doctor) about any new or lasting problems. The primary care or specialist physician will see patients referred from the [facility] Emergency Department.  Terry Ramsey

## (undated) NOTE — LETTER
Tracy Alberts 182  [address] [facility] [address], 209 [facility]  Authorization for Surgical Operation and Procedure     Date:___________                                                                                                         Time:__________ that can occur: fever and allergic reactions, hemolytic reactions, transmission of diseases such as Hepatitis, AIDS and Cytomegalovirus (CMV) and fluid overload.   In the event that I wish to have an autologous transfusion of my own blood, or a directed don when the applicable recovery period ends for purposes of reinstating the DNAR order.   10. Patients having a sterilization procedure: I understand that if the procedure is successful the results will be permanent and it will therefore be impossible for me t me medicine and do additional procedures as necessary. Some examples are: Starting or using an “IV” to give me medicine, fluids or blood during my procedure, and having a breathing tube placed to help me breathe when I’m asleep (intubation).  In the event t but potential complications include headache, bleeding, infection, seizure, irregular heart rhythms, and nerve injury.     I can change my mind about having anesthesia services at any time before I get the medicine.    ______________________________________

## (undated) NOTE — ED AVS SNAPSHOT
Lidiateo Lorenzo   MRN: [id number]    Department:  [facility] Emergency Department   Date of Visit:  9/14/2019           Disclosure     Insurance plans vary and the physician(s) referred by the ER may not be covered by your plan.  Please contact your tell this physician (or your personal doctor if your instructions are to return to your personal doctor) about any new or lasting problems. The primary care or specialist physician will see patients referred from the [facility] Emergency Department.  Renetta Stoll

## (undated) NOTE — ED AVS SNAPSHOT
Lisseth Stuart   MRN: [id number]    Department:  [facility] Emergency Department   Date of Visit:  5/29/2018           Disclosure     Insurance plans vary and the physician(s) referred by the ER may not be covered by your plan.  Please contact your tell this physician (or your personal doctor if your instructions are to return to your personal doctor) about any new or lasting problems. The primary care or specialist physician will see patients referred from the [facility] Emergency Department.  Arnaldo Piedra

## (undated) NOTE — LETTER
[facility] [address], [address]  Autorización para operación y procedimiento quirúrgico   Fecha:___________                                                                                                         Denise:__________ todavía puedo estar sujeto a efectos adversos teagan resultado de recibir marko transfusión de Danny y/o productos sanguíneos.   A continuación se mencionan algunos, aunque no todos, los riesgos potenciales que pueden ocurrir: fiebre y reacciones alérgicas, re desarrollar marko reacción en la piel.     9. Si tengo marko orden de No intentar la reanimación (SARAHI), kay estado se suspenderá mientras esté en el quirófano, la caleb de procedimientos y keny el periodo de recuperación a menos que yo indique lo contrario ex atendido por un anestesiólogo, quien está especialmente capacitado para monitorearme y darme medicamento para hacerme dormir o mantenerme cómodo keny mi procedimiento.     Entiendo que mi anestesiólogo no es un empleado o agente de [address] alérgicas a los medicamentos, lesiones en las vías respiratorias, el corazón, los pulmones, la visión, los nervios o músculos, y en casos sumamente raros, la [place].     5. Mi médico me ha explicado otras opciones de atención disponibles para mí (alternativ es la del paciente o del representante del paciente, y que se firmó antes del procedimiento. Nombre del paciente: Sierra Cardoza Fec.  de Nac.: 6/9/1955                 En letra de imprenta: July 27, 2021  Expediente médico No. [id number]

## (undated) NOTE — LETTER
[address] [facility]     I agree to have a Peripherally Inserted Central Catheter (PICC) placed in my arm.    1. The PICC insertion procedure, care, maintenance, risks, benefits, and complications have been explained to me by my physic to the PICC, including risks, benefits, and side effects related to the alternatives and risks related to not receiving this procedure.     8.  I have expressed any questions about this procedure to my physician or the PICC Proceduralist and he/she has answ

## (undated) NOTE — LETTER
Tracy Alberts 182  [address] [facility] [address], [address] [facility]  Authorization for Surgical Operation and Procedure     Date:___________                                                                                                         Time:__________ but not all, of the potential risks that can occur: fever and allergic reactions, hemolytic reactions, transmission of diseases such as Hepatitis, AIDS and Cytomegalovirus (CMV) and fluid overload.   In the event that I wish to have an autologous transfusio attending physician will determine when the applicable recovery period ends for purposes of reinstating the DNAR order.   10. Patients having a sterilization procedure: I understand that if the procedure is successful the results will be permanent and it wi anesthesiologist (anesthesia doctor) to give me medicine and do additional procedures as necessary.  Some examples are: Starting or using an “IV” to give me medicine, fluids or blood during my procedure, and having a breathing tube placed to help me breathe “epidural”, & “nerve blocks”): I understand that rare but potential complications include headache, bleeding, infection, seizure, irregular heart rhythms, and nerve injury.     I can change my mind about having anesthesia services at any time before I get

## (undated) NOTE — LETTER
[facility] [address], [address] [place] [address]    Consent for Anesthesia   1.    Ramon Rust agree to be cared for by a physician anesthesiologist alone and/or with a nurse anesthetist, who is specially trained to monitor me and give me me allergic reactions to medications, injury to my airway, heart, lungs, vision, nerves, or muscles and in extremely rare instances death. 5. My doctor has explained to me other choices available to me for my care (alternatives).   6. Pregnant Patients (“epid Printed: 7/15/2021 at 10:38 AM    Medical Record #: [id number]                                            Page 1 of 1